# Patient Record
Sex: FEMALE | Race: WHITE | NOT HISPANIC OR LATINO | Employment: OTHER | URBAN - METROPOLITAN AREA
[De-identification: names, ages, dates, MRNs, and addresses within clinical notes are randomized per-mention and may not be internally consistent; named-entity substitution may affect disease eponyms.]

---

## 2017-05-12 ENCOUNTER — APPOINTMENT (OUTPATIENT)
Dept: LAB | Facility: MEDICAL CENTER | Age: 72
End: 2017-05-12
Payer: COMMERCIAL

## 2017-05-12 ENCOUNTER — TRANSCRIBE ORDERS (OUTPATIENT)
Dept: ADMINISTRATIVE | Facility: HOSPITAL | Age: 72
End: 2017-05-12

## 2017-05-12 DIAGNOSIS — E78.00 PURE HYPERCHOLESTEROLEMIA: ICD-10-CM

## 2017-05-12 DIAGNOSIS — I10 ESSENTIAL HYPERTENSION, MALIGNANT: ICD-10-CM

## 2017-05-12 DIAGNOSIS — E11.9 DIABETES MELLITUS WITHOUT COMPLICATION (HCC): ICD-10-CM

## 2017-05-12 DIAGNOSIS — E78.00 PURE HYPERCHOLESTEROLEMIA: Primary | ICD-10-CM

## 2017-05-12 LAB
CHOLEST SERPL-MCNC: 174 MG/DL (ref 50–200)
CREAT UR-MCNC: 179 MG/DL
EST. AVERAGE GLUCOSE BLD GHB EST-MCNC: 148 MG/DL
GLUCOSE P FAST SERPL-MCNC: 143 MG/DL (ref 65–99)
HBA1C MFR BLD: 6.8 % (ref 4.2–6.3)
HDLC SERPL-MCNC: 35 MG/DL (ref 40–60)
LDLC SERPL CALC-MCNC: 83 MG/DL (ref 0–100)
MICROALBUMIN UR-MCNC: 42.8 MG/L (ref 0–20)
MICROALBUMIN/CREAT 24H UR: 24 MG/G CREATININE (ref 0–30)
TRIGL SERPL-MCNC: 279 MG/DL

## 2017-05-12 PROCEDURE — 82570 ASSAY OF URINE CREATININE: CPT | Performed by: FAMILY MEDICINE

## 2017-05-12 PROCEDURE — 36415 COLL VENOUS BLD VENIPUNCTURE: CPT

## 2017-05-12 PROCEDURE — 82043 UR ALBUMIN QUANTITATIVE: CPT | Performed by: FAMILY MEDICINE

## 2017-05-12 PROCEDURE — 83036 HEMOGLOBIN GLYCOSYLATED A1C: CPT

## 2017-05-12 PROCEDURE — 82947 ASSAY GLUCOSE BLOOD QUANT: CPT

## 2017-05-12 PROCEDURE — 80061 LIPID PANEL: CPT

## 2017-12-29 ENCOUNTER — TRANSCRIBE ORDERS (OUTPATIENT)
Dept: ADMINISTRATIVE | Facility: HOSPITAL | Age: 72
End: 2017-12-29

## 2017-12-29 ENCOUNTER — APPOINTMENT (OUTPATIENT)
Dept: LAB | Facility: MEDICAL CENTER | Age: 72
End: 2017-12-29
Payer: COMMERCIAL

## 2017-12-29 DIAGNOSIS — E78.00 PURE HYPERCHOLESTEROLEMIA: Primary | ICD-10-CM

## 2017-12-29 LAB
ALBUMIN SERPL BCP-MCNC: 3.6 G/DL (ref 3.5–5)
ALP SERPL-CCNC: 80 U/L (ref 46–116)
ALT SERPL W P-5'-P-CCNC: 32 U/L (ref 12–78)
ANION GAP SERPL CALCULATED.3IONS-SCNC: 7 MMOL/L (ref 4–13)
AST SERPL W P-5'-P-CCNC: 19 U/L (ref 5–45)
BASOPHILS # BLD AUTO: 0.03 THOUSANDS/ΜL (ref 0–0.1)
BASOPHILS NFR BLD AUTO: 0 % (ref 0–1)
BILIRUB SERPL-MCNC: 0.59 MG/DL (ref 0.2–1)
BUN SERPL-MCNC: 16 MG/DL (ref 5–25)
CALCIUM SERPL-MCNC: 9.2 MG/DL (ref 8.3–10.1)
CHLORIDE SERPL-SCNC: 104 MMOL/L (ref 100–108)
CHOLEST SERPL-MCNC: 200 MG/DL (ref 50–200)
CO2 SERPL-SCNC: 29 MMOL/L (ref 21–32)
CREAT SERPL-MCNC: 1.02 MG/DL (ref 0.6–1.3)
EOSINOPHIL # BLD AUTO: 0.21 THOUSAND/ΜL (ref 0–0.61)
EOSINOPHIL NFR BLD AUTO: 3 % (ref 0–6)
ERYTHROCYTE [DISTWIDTH] IN BLOOD BY AUTOMATED COUNT: 13 % (ref 11.6–15.1)
GFR SERPL CREATININE-BSD FRML MDRD: 55 ML/MIN/1.73SQ M
GLUCOSE P FAST SERPL-MCNC: 147 MG/DL (ref 65–99)
HCT VFR BLD AUTO: 46.2 % (ref 34.8–46.1)
HDLC SERPL-MCNC: 39 MG/DL (ref 40–60)
HGB BLD-MCNC: 15.3 G/DL (ref 11.5–15.4)
LDLC SERPL CALC-MCNC: 82 MG/DL (ref 0–100)
LYMPHOCYTES # BLD AUTO: 2.34 THOUSANDS/ΜL (ref 0.6–4.47)
LYMPHOCYTES NFR BLD AUTO: 29 % (ref 14–44)
MCH RBC QN AUTO: 31 PG (ref 26.8–34.3)
MCHC RBC AUTO-ENTMCNC: 33.1 G/DL (ref 31.4–37.4)
MCV RBC AUTO: 94 FL (ref 82–98)
MONOCYTES # BLD AUTO: 0.8 THOUSAND/ΜL (ref 0.17–1.22)
MONOCYTES NFR BLD AUTO: 10 % (ref 4–12)
NEUTROPHILS # BLD AUTO: 4.59 THOUSANDS/ΜL (ref 1.85–7.62)
NEUTS SEG NFR BLD AUTO: 58 % (ref 43–75)
NRBC BLD AUTO-RTO: 0 /100 WBCS
PLATELET # BLD AUTO: 255 THOUSANDS/UL (ref 149–390)
PMV BLD AUTO: 11.7 FL (ref 8.9–12.7)
POTASSIUM SERPL-SCNC: 4.1 MMOL/L (ref 3.5–5.3)
PROT SERPL-MCNC: 7.2 G/DL (ref 6.4–8.2)
RBC # BLD AUTO: 4.93 MILLION/UL (ref 3.81–5.12)
SODIUM SERPL-SCNC: 140 MMOL/L (ref 136–145)
TRIGL SERPL-MCNC: 395 MG/DL
TSH SERPL DL<=0.05 MIU/L-ACNC: 2.67 UIU/ML (ref 0.36–3.74)
WBC # BLD AUTO: 7.99 THOUSAND/UL (ref 4.31–10.16)

## 2017-12-29 PROCEDURE — 85025 COMPLETE CBC W/AUTO DIFF WBC: CPT | Performed by: FAMILY MEDICINE

## 2017-12-29 PROCEDURE — 80061 LIPID PANEL: CPT | Performed by: FAMILY MEDICINE

## 2017-12-29 PROCEDURE — 80053 COMPREHEN METABOLIC PANEL: CPT | Performed by: FAMILY MEDICINE

## 2017-12-29 PROCEDURE — 84443 ASSAY THYROID STIM HORMONE: CPT | Performed by: FAMILY MEDICINE

## 2017-12-29 PROCEDURE — 36415 COLL VENOUS BLD VENIPUNCTURE: CPT | Performed by: FAMILY MEDICINE

## 2018-01-29 ENCOUNTER — APPOINTMENT (OUTPATIENT)
Dept: LAB | Facility: MEDICAL CENTER | Age: 73
End: 2018-01-29
Payer: COMMERCIAL

## 2018-01-29 ENCOUNTER — TRANSCRIBE ORDERS (OUTPATIENT)
Dept: ADMINISTRATIVE | Facility: HOSPITAL | Age: 73
End: 2018-01-29

## 2018-01-29 DIAGNOSIS — R21 RASH: Primary | ICD-10-CM

## 2018-01-29 DIAGNOSIS — R21 RASH AND OTHER NONSPECIFIC SKIN ERUPTION: Primary | ICD-10-CM

## 2018-01-29 LAB
BASOPHILS # BLD AUTO: 0.03 THOUSANDS/ΜL (ref 0–0.1)
BASOPHILS NFR BLD AUTO: 0 % (ref 0–1)
EOSINOPHIL # BLD AUTO: 0.18 THOUSAND/ΜL (ref 0–0.61)
EOSINOPHIL NFR BLD AUTO: 2 % (ref 0–6)
ERYTHROCYTE [DISTWIDTH] IN BLOOD BY AUTOMATED COUNT: 13.1 % (ref 11.6–15.1)
HCT VFR BLD AUTO: 42.7 % (ref 34.8–46.1)
HGB BLD-MCNC: 14.1 G/DL (ref 11.5–15.4)
LYMPHOCYTES # BLD AUTO: 2.61 THOUSANDS/ΜL (ref 0.6–4.47)
LYMPHOCYTES NFR BLD AUTO: 30 % (ref 14–44)
MCH RBC QN AUTO: 30.8 PG (ref 26.8–34.3)
MCHC RBC AUTO-ENTMCNC: 33 G/DL (ref 31.4–37.4)
MCV RBC AUTO: 93 FL (ref 82–98)
MONOCYTES # BLD AUTO: 0.85 THOUSAND/ΜL (ref 0.17–1.22)
MONOCYTES NFR BLD AUTO: 10 % (ref 4–12)
NEUTROPHILS # BLD AUTO: 5.02 THOUSANDS/ΜL (ref 1.85–7.62)
NEUTS SEG NFR BLD AUTO: 58 % (ref 43–75)
NRBC BLD AUTO-RTO: 0 /100 WBCS
PLATELET # BLD AUTO: 249 THOUSANDS/UL (ref 149–390)
PMV BLD AUTO: 11.5 FL (ref 8.9–12.7)
RBC # BLD AUTO: 4.58 MILLION/UL (ref 3.81–5.12)
WBC # BLD AUTO: 8.71 THOUSAND/UL (ref 4.31–10.16)

## 2018-01-29 PROCEDURE — 85025 COMPLETE CBC W/AUTO DIFF WBC: CPT | Performed by: PHYSICIAN ASSISTANT

## 2018-01-29 PROCEDURE — 36415 COLL VENOUS BLD VENIPUNCTURE: CPT | Performed by: PHYSICIAN ASSISTANT

## 2018-06-22 ENCOUNTER — APPOINTMENT (EMERGENCY)
Dept: ULTRASOUND IMAGING | Facility: HOSPITAL | Age: 73
End: 2018-06-22
Payer: COMMERCIAL

## 2018-06-22 ENCOUNTER — OFFICE VISIT (OUTPATIENT)
Dept: URGENT CARE | Facility: CLINIC | Age: 73
End: 2018-06-22
Payer: COMMERCIAL

## 2018-06-22 ENCOUNTER — HOSPITAL ENCOUNTER (EMERGENCY)
Facility: HOSPITAL | Age: 73
Discharge: HOME | End: 2018-06-22
Attending: EMERGENCY MEDICINE | Admitting: EMERGENCY MEDICINE
Payer: COMMERCIAL

## 2018-06-22 VITALS
SYSTOLIC BLOOD PRESSURE: 208 MMHG | DIASTOLIC BLOOD PRESSURE: 84 MMHG | HEART RATE: 78 BPM | RESPIRATION RATE: 16 BRPM | OXYGEN SATURATION: 95 % | HEIGHT: 70 IN | TEMPERATURE: 97.4 F | WEIGHT: 195 LBS | BODY MASS INDEX: 27.92 KG/M2

## 2018-06-22 DIAGNOSIS — L81.9 DISCOLORATION OF SKIN OF FOOT: Primary | ICD-10-CM

## 2018-06-22 DIAGNOSIS — R23.0 TOE CYANOSIS: Primary | ICD-10-CM

## 2018-06-22 LAB
ANION GAP SERPL CALCULATED.3IONS-SCNC: 8 MMOL/L (ref 4–13)
APTT PPP: 29 SECONDS (ref 24–36)
BASOPHILS # BLD AUTO: 0.04 THOUSANDS/ΜL (ref 0–0.1)
BASOPHILS NFR BLD AUTO: 0 % (ref 0–1)
BUN SERPL-MCNC: 17 MG/DL (ref 5–25)
CALCIUM SERPL-MCNC: 9.4 MG/DL (ref 8.3–10.1)
CHLORIDE SERPL-SCNC: 106 MMOL/L (ref 100–108)
CK SERPL-CCNC: 70 U/L (ref 26–192)
CO2 SERPL-SCNC: 29 MMOL/L (ref 21–32)
CREAT SERPL-MCNC: 1.06 MG/DL (ref 0.6–1.3)
EOSINOPHIL # BLD AUTO: 0.11 THOUSAND/ΜL (ref 0–0.61)
EOSINOPHIL NFR BLD AUTO: 1 % (ref 0–6)
ERYTHROCYTE [DISTWIDTH] IN BLOOD BY AUTOMATED COUNT: 12.6 % (ref 11.6–15.1)
GFR SERPL CREATININE-BSD FRML MDRD: 52 ML/MIN/1.73SQ M
GLUCOSE SERPL-MCNC: 184 MG/DL (ref 65–140)
HCT VFR BLD AUTO: 45.6 % (ref 34.8–46.1)
HGB BLD-MCNC: 15.3 G/DL (ref 11.5–15.4)
IMM GRANULOCYTES # BLD AUTO: 0.05 THOUSAND/UL (ref 0–0.2)
IMM GRANULOCYTES NFR BLD AUTO: 1 % (ref 0–2)
INR PPP: 0.97 (ref 0.86–1.17)
LYMPHOCYTES # BLD AUTO: 2.19 THOUSANDS/ΜL (ref 0.6–4.47)
LYMPHOCYTES NFR BLD AUTO: 21 % (ref 14–44)
MCH RBC QN AUTO: 31.1 PG (ref 26.8–34.3)
MCHC RBC AUTO-ENTMCNC: 33.6 G/DL (ref 31.4–37.4)
MCV RBC AUTO: 93 FL (ref 82–98)
MONOCYTES # BLD AUTO: 0.98 THOUSAND/ΜL (ref 0.17–1.22)
MONOCYTES NFR BLD AUTO: 9 % (ref 4–12)
NEUTROPHILS # BLD AUTO: 7.14 THOUSANDS/ΜL (ref 1.85–7.62)
NEUTS SEG NFR BLD AUTO: 68 % (ref 43–75)
NRBC BLD AUTO-RTO: 0 /100 WBCS
PLATELET # BLD AUTO: 235 THOUSANDS/UL (ref 149–390)
PMV BLD AUTO: 11.1 FL (ref 8.9–12.7)
POTASSIUM SERPL-SCNC: 3.9 MMOL/L (ref 3.5–5.3)
PROTHROMBIN TIME: 12.8 SECONDS (ref 11.8–14.2)
RBC # BLD AUTO: 4.92 MILLION/UL (ref 3.81–5.12)
SODIUM SERPL-SCNC: 143 MMOL/L (ref 136–145)
WBC # BLD AUTO: 10.51 THOUSAND/UL (ref 4.31–10.16)

## 2018-06-22 PROCEDURE — 80048 BASIC METABOLIC PNL TOTAL CA: CPT | Performed by: PHYSICIAN ASSISTANT

## 2018-06-22 PROCEDURE — 85610 PROTHROMBIN TIME: CPT | Performed by: PHYSICIAN ASSISTANT

## 2018-06-22 PROCEDURE — 99213 OFFICE O/P EST LOW 20 MIN: CPT | Performed by: PHYSICIAN ASSISTANT

## 2018-06-22 PROCEDURE — 93923 UPR/LXTR ART STDY 3+ LVLS: CPT

## 2018-06-22 PROCEDURE — 82550 ASSAY OF CK (CPK): CPT | Performed by: PHYSICIAN ASSISTANT

## 2018-06-22 PROCEDURE — 85025 COMPLETE CBC W/AUTO DIFF WBC: CPT | Performed by: PHYSICIAN ASSISTANT

## 2018-06-22 PROCEDURE — 36415 COLL VENOUS BLD VENIPUNCTURE: CPT | Performed by: PHYSICIAN ASSISTANT

## 2018-06-22 PROCEDURE — 99284 EMERGENCY DEPT VISIT MOD MDM: CPT

## 2018-06-22 PROCEDURE — 85730 THROMBOPLASTIN TIME PARTIAL: CPT | Performed by: PHYSICIAN ASSISTANT

## 2018-06-22 RX ORDER — IBUPROFEN 200 MG
TABLET ORAL EVERY 6 HOURS PRN
COMMUNITY

## 2018-06-22 RX ORDER — LORAZEPAM 2 MG/ML
0.5 INJECTION INTRAMUSCULAR ONCE
Status: DISCONTINUED | OUTPATIENT
Start: 2018-06-22 | End: 2018-06-22

## 2018-06-22 RX ORDER — QUINAPRIL 40 MG/1
40 TABLET ORAL 2 TIMES DAILY
COMMUNITY

## 2018-06-22 RX ORDER — QUINAPRIL 20 MG/1
40 TABLET ORAL DAILY
Status: DISCONTINUED | OUTPATIENT
Start: 2018-06-22 | End: 2018-06-22

## 2018-06-22 RX ORDER — ASPIRIN 81 MG/1
81 TABLET, CHEWABLE ORAL ONCE
Status: COMPLETED | OUTPATIENT
Start: 2018-06-22 | End: 2018-06-22

## 2018-06-22 RX ORDER — ASPIRIN 81 MG/1
81 TABLET ORAL DAILY
Qty: 30 TABLET | Refills: 0 | Status: SHIPPED | OUTPATIENT
Start: 2018-06-22 | End: 2018-06-25 | Stop reason: ALTCHOICE

## 2018-06-22 RX ADMIN — ASPIRIN 81 MG 81 MG: 81 TABLET ORAL at 22:10

## 2018-06-22 NOTE — PATIENT INSTRUCTIONS
Bottom of left foot is mottled and discolored  Great toe is purple and is cold upon palpation  I am concerned about decreased blood flow to this area therefore I recommend that you go to the ER for further work-up and management

## 2018-06-22 NOTE — PROGRESS NOTES
[facility]        NAME: Arjun Riddle is a 74 y o  female  : 1945    MRN: [id number]  DATE: 2018  TIME: 4:49 PM    Assessment and Plan   Discoloration of skin of foot [L81 9]  1  Discoloration of skin of foot           Patient Instructions     Bottom of left foot is mottled and discolored  Great toe is purple and is cold upon palpation  I am concerned about a vascular issue therefore I recommend that you go to the ER for further work-up and management  Chief Complaint     Chief Complaint   Patient presents with    Foot Pain     L foot  Pt arrives from PT at [facility]  Was directed to come here by staff because of "discoloration" L foot  History of Present Illness       The patient is a 75-year-old female with a medical history of hypertension and hyperlipidemia who presents today for evaluation of left foot pain  Patient states that she saw a podiatrist on Monday and was diagnosed with plantar fasciitis and was given a cortisone injection  She states that the podiatrist ordered her physical therapy and she was just there prior to arrival and they took a look at her left foot in they did not like the color of it therefore they recommended she come here for evaluation  Patient states that she previously had x-rays of her foot which were normal   She denies any recent injury or trauma  Review of Systems   Review of Systems   Constitutional: Negative for chills and fever  Musculoskeletal: Positive for arthralgias  Skin: Positive for color change  Neurological: Negative for numbness           Current Medications       Current Outpatient Prescriptions:     ibuprofen (MOTRIN) 200 mg tablet, Take by mouth every 6 (six) hours as needed for mild pain, Disp: , Rfl:     LOVASTATIN PO, Take 40 mg by mouth, Disp: , Rfl:     quinapril (ACCUPRIL) 40 MG tablet, Take 40 mg by mouth daily at bedtime, Disp: , Rfl:     Current Allergies     Allergies as of 2018 - Reviewed 06/22/2018   Allergen Reaction Noted    Meperidine  05/11/2016    Morphine and related  05/11/2016    Sudafed [pseudoephedrine]  06/22/2018            The following portions of the patient's history were reviewed and updated as appropriate: allergies, current medications, past family history, past medical history, past social history, past surgical history and problem list      No past medical history on file  No past surgical history on file  No family history on file  Medications have been verified  Objective   There were no vitals taken for this visit  Physical Exam     Physical Exam   Constitutional: She is oriented to person, place, and time  She appears well-developed and well-nourished  No distress  Cardiovascular: Normal rate, regular rhythm and normal heart sounds  Pulmonary/Chest: Effort normal and breath sounds normal  She has no wheezes  She has no rales  Musculoskeletal:        Feet:    Neurological: She is alert and oriented to person, place, and time  She has normal strength  No sensory deficit  Skin: Skin is warm and dry  Psychiatric: She has a normal mood and affect  Nursing note and vitals reviewed

## 2018-06-23 ENCOUNTER — TELEPHONE (OUTPATIENT)
Dept: EMERGENCY DEPT | Facility: HOSPITAL | Age: 73
End: 2018-06-23

## 2018-06-23 PROCEDURE — 93923 UPR/LXTR ART STDY 3+ LVLS: CPT | Performed by: SURGERY

## 2018-06-23 NOTE — ED NOTES
Pt ambulated to bathroom, gait unsteady secondary to pain with ambulating that shoots up her legstates she is using cane at home     [address], RN  06/22/18 2019

## 2018-06-23 NOTE — DISCHARGE INSTRUCTIONS
- IF YOU NOTICE SUDDEN WORSENING OF THE COLOR OF HER TOE OR FOOT, WHITE FOOT, COLD FOOT, UNABLE TO MOVE HER FOOT, WORSENING PAIN OR FOOT PLEASE RETURN IMMEDIATELY TO THE EMERGENCY DEPARTMENT FOLLOW UP WITH VASCULAR SURGEON ON MONDAY PLEASE  THE TAKE BABY ASPIRIN DAILY PER    Hypertension   WHAT YOU NEED TO KNOW:   Hypertension is high blood pressure (BP)  Your BP is the force of your blood moving against the walls of your arteries  Normal BP is less than 120/80  Prehypertension is between 120/80 and 139/89  Hypertension is 140/90 or higher  Hypertension causes your BP to get so high that your heart has to work much harder than normal  This can damage your heart  You can control hypertension with a healthy lifestyle or medicines  A controlled blood pressure helps protect your organs, such as your heart, lungs, brain, and kidneys  DISCHARGE INSTRUCTIONS:   Call 911 for any of the following:   · You have discomfort in your chest that feels like squeezing, pressure, fullness, or pain  · You become confused or have difficulty speaking  · You suddenly feel lightheaded or have trouble breathing  · You have pain or discomfort in your back, neck, jaw, stomach, or arm  Return to the emergency department if:   · You have a severe headache or vision loss  · You have weakness in an arm or leg  Contact your healthcare provider if:   · You feel faint, dizzy, confused, or drowsy  · You have been taking your BP medicine and your BP is still higher than your healthcare provider says it should be  · You have questions or concerns about your condition or care  Medicines: You may  need any of the following:  · Medicine  may be used to help lower your BP  You may need more than one type of medicine  Take the medicine exactly as directed  · Diuretics  help decrease extra fluid that collects in your body  This will help lower your BP  You may urinate more often while you take this medicine      · Cholesterol medicine  helps lower your cholesterol level  A low cholesterol level helps prevent heart disease and makes it easier to control your blood pressure  · Take your medicine as directed  Contact your healthcare provider if you think your medicine is not helping or if you have side effects  Tell him or her if you are allergic to any medicine  Keep a list of the medicines, vitamins, and herbs you take  Include the amounts, and when and why you take them  Bring the list or the pill bottles to follow-up visits  Carry your medicine list with you in case of an emergency  Follow up with your healthcare provider as directed: You will need to return to have your BP checked and to have other lab tests done  Write down your questions so you remember to ask them during your visits  Manage hypertension:  Talk with your healthcare provider about these and other ways to manage hypertension:  · Check your BP at home  Sit and rest for 5 minutes before you take your BP  Extend your arm and support it on a flat surface  Your arm should be at the same level as your heart  Follow the directions that came with your BP monitor  If possible, take at least 2 BP readings each time  Take your BP at least twice a day at the same times each day, such as morning and evening  Keep a record of your BP readings and bring it to your follow-up visits  Ask your healthcare provider what your BP should be  · Limit sodium (salt) as directed  Too much sodium can affect your fluid balance  Check labels to find low-sodium or no-salt-added foods  Some low-sodium foods use potassium salts for flavor  Too much potassium can also cause health problems  Your healthcare provider will tell you how much sodium and potassium are safe for you to have in a day  He or she may recommend that you limit sodium to 2,300 mg a day  · Follow the meal plan recommended by your healthcare provider    A dietitian or your provider can give you more information on low-sodium plans or the DASH (Dietary Approaches to Stop Hypertension) eating plan  The DASH plan is low in sodium, unhealthy fats, and total fat  It is high in potassium, calcium, and fiber  · Exercise to maintain a healthy weight  Exercise at least 30 minutes per day, on most days of the week  This will help decrease your blood pressure  Ask your healthcare provider about the best exercise plan for you  · Decrease stress  This may help lower your BP  Learn ways to relax, such as deep breathing or listening to music  · Limit alcohol  Women should limit alcohol to 1 drink a day  Men should limit alcohol to 2 drinks a day  A drink of alcohol is 12 ounces of beer, 5 ounces of wine, or 1½ ounces of liquor  · Do not smoke  Nicotine and other chemicals in cigarettes and cigars can increase your BP and also cause lung damage  Ask your healthcare provider for information if you currently smoke and need help to quit  E-cigarettes or smokeless tobacco still contain nicotine  Talk to your healthcare provider before you use these products  · Manage any other health conditions you have  Health conditions such as diabetes can increase your risk for hypertension  Follow your healthcare provider's instructions and take all your medicines as directed  © 2017 [address]  Information is for End User's use only and may not be sold, redistributed or otherwise used for commercial purposes  All illustrations and images included in CareNotes® are the copyrighted property of [employer]  or Jamin Anderson  The above information is an  only  It is not intended as medical advice for individual conditions or treatments  Talk to your doctor, nurse or pharmacist before following any medical regimen to see if it is safe and effective for you

## 2018-06-23 NOTE — ED PROVIDER NOTES
History  Chief Complaint   Patient presents with    Foot Pain     Pt was seen by foot specialist on Monday and gave shot of cortizone for left foot, went to PT, PT sent to [facility] because of concern of discoloration of foot, [facility] sent to ER for evaluation  Denies numbness/tingling  States slight swelling  Toe Pain   Location:  Left great toe  Quality:  Discoloration  Severity:  Mild  Onset quality:  Gradual  Duration:  3 weeks  Timing:  Constant  Progression:  Worsening (has not worsened over the past week)  Chronicity:  New  Context:  No history of a-fib  Relieved by:  Nothing  Worsened by:  Nothing  Associated symptoms: no abdominal pain, no chest pain, no congestion, no diarrhea, no ear pain, no fatigue, no fever, no headaches, no myalgias, no nausea, no rash, no shortness of breath, no sore throat, no vomiting and no wheezing        Prior to Admission Medications   Prescriptions Last Dose Informant Patient Reported? Taking? LOVASTATIN PO   Yes No   Sig: Take 40 mg by mouth   ibuprofen (MOTRIN) 200 mg tablet   Yes No   Sig: Take by mouth every 6 (six) hours as needed for mild pain   quinapril (ACCUPRIL) 40 MG tablet   Yes No   Sig: Take 40 mg by mouth daily at bedtime      Facility-Administered Medications: None       No past medical history on file  No past surgical history on file  No family history on file  I have reviewed and agree with the history as documented  Social History   Substance Use Topics    Smoking status: Never Smoker    Smokeless tobacco: Not on file    Alcohol use No        Review of Systems   Constitutional: Negative for activity change, chills, fatigue and fever  HENT: Negative for congestion, ear pain, mouth sores, sore throat and trouble swallowing  Eyes: Negative for photophobia and visual disturbance  Respiratory: Negative for chest tightness, shortness of breath and wheezing  Cardiovascular: Negative for chest pain and palpitations  Gastrointestinal: Negative for abdominal pain, constipation, diarrhea, nausea and vomiting  Genitourinary: Negative for decreased urine volume, difficulty urinating, dysuria, genital sores and hematuria  Musculoskeletal: Negative for arthralgias, myalgias, neck pain and neck stiffness  Skin: Negative for rash and wound  Neurological: Negative for dizziness, syncope, weakness, light-headedness, numbness and headaches  Hematological: Negative for adenopathy  All other systems reviewed and are negative  Physical Exam  Physical Exam   Constitutional: She is oriented to person, place, and time  She appears well-developed and well-nourished  No distress  HENT:   Head: Normocephalic and atraumatic  Right Ear: External ear normal    Left Ear: External ear normal    Nose: Nose normal    Mouth/Throat: Oropharynx is clear and moist    Eyes: Conjunctivae and EOM are normal  Pupils are equal, round, and reactive to light  No scleral icterus  Neck: Normal range of motion  Neck supple  Cardiovascular: Normal rate, regular rhythm, normal heart sounds and intact distal pulses  Exam reveals no gallop and no friction rub  No murmur heard  Pulmonary/Chest: Effort normal and breath sounds normal  No respiratory distress  She has no wheezes  Abdominal: Soft  She exhibits no distension  There is no tenderness  There is no guarding  Musculoskeletal: Normal range of motion  She exhibits no edema, tenderness or deformity  Lymphadenopathy:     She has no cervical adenopathy  Neurological: She is alert and oriented to person, place, and time  Skin: Skin is warm and dry  Capillary refill takes less than 2 seconds  She is not diaphoretic  Nursing note and vitals reviewed        Vital Signs  ED Triage Vitals [06/22/18 1746]   Temperature Pulse Respirations Blood Pressure SpO2   97 6 °F (36 4 °C) 92 18 (!) 222/100 95 %      Temp Source Heart Rate Source Patient Position - Orthostatic VS BP Location FiO2 (%)   Oral Monitor Sitting Left arm --      Pain Score       7           Vitals:    06/22/18 1746 06/22/18 1845 06/22/18 1938 06/22/18 2208   BP: (!) 222/100 (!) 240/102 (!) 230/98 (!) 208/84   Pulse: 92 82 88 78   Patient Position - Orthostatic VS: Sitting Sitting Lying Lying       Visual Acuity      ED Medications  Medications   aspirin chewable tablet 81 mg (81 mg Oral Given 6/22/18 2210)       Diagnostic Studies  Results Reviewed     Procedure Component Value Units Date/Time    Basic metabolic panel [[id number]]  (Abnormal) Collected:  06/22/18 1932    Lab Status:  Final result Specimen:  Blood from Arm, Right Updated:  06/22/18 2003     Sodium 143 mmol/L      Potassium 3 9 mmol/L      Chloride 106 mmol/L      CO2 29 mmol/L      Anion Gap 8 mmol/L      BUN 17 mg/dL      Creatinine 1 06 mg/dL      Glucose 184 (H) mg/dL      Calcium 9 4 mg/dL      eGFR 52 ml/min/1 73sq m     Narrative:         National Kidney Disease Education Program recommendations are as follows:  GFR calculation is accurate only with a steady state creatinine  Chronic Kidney disease less than 60 ml/min/1 73 sq  meters  Kidney failure less than 15 ml/min/1 73 sq  meters      CK (with reflex to MB) [[id number]]  (Normal) Collected:  06/22/18 1932    Lab Status:  Final result Specimen:  Blood from Arm, Right Updated:  06/22/18 2003     Total CK 70 U/L     Protime-INR [[id number]]  (Normal) Collected:  06/22/18 1932    Lab Status:  Final result Specimen:  Blood from Arm, Right Updated:  06/22/18 1959     Protime 12 8 seconds      INR 0 97    APTT [[id number]]  (Normal) Collected:  06/22/18 1932    Lab Status:  Final result Specimen:  Blood from Arm, Right Updated:  06/22/18 1959     PTT 29 seconds     CBC and differential [[id number]]  (Abnormal) Collected:  06/22/18 1932    Lab Status:  Final result Specimen:  Blood from Arm, Right Updated:  06/22/18 1948     WBC 10 51 (H) Thousand/uL      RBC 4 92 Million/uL      Hemoglobin 15 3 g/dL      Hematocrit 45 6 % MCV 93 fL      MCH 31 1 pg      MCHC 33 6 g/dL      RDW 12 6 %      MPV 11 1 fL      Platelets 683 Thousands/uL      nRBC 0 /100 WBCs      Neutrophils Relative 68 %      Immat GRANS % 1 %      Lymphocytes Relative 21 %      Monocytes Relative 9 %      Eosinophils Relative 1 %      Basophils Relative 0 %      Neutrophils Absolute 7 14 Thousands/µL      Immature Grans Absolute 0 05 Thousand/uL      Lymphocytes Absolute 2 19 Thousands/µL      Monocytes Absolute 0 98 Thousand/µL      Eosinophils Absolute 0 11 Thousand/µL      Basophils Absolute 0 04 Thousands/µL                  VAS JOANA & waveform analysis, multiple levels    (Results Pending)              Procedures  Procedures       Phone Contacts  ED Phone Contact    ED Course  ED Course as of Jun 23 0032 Fri Jun 22, 2018 2043 Vascular tech aware of patient  He states that he has two other STAT arterial US to perform on other patients in the ICU at this time  He states that has patient ankle is warm and has pulses he will perform the other STAT tests prior to this patient she seems lower risk  I reassured tech I spoke with vascular surgeon and that this test is desired  He acknowledges and will return  2204 Spoke with  who recommended US after patient was intiial evaluated  2205 BP improved to 203 SBP  Patient refusing ativan and would jsut like to go home as she knows that her elevated BP is from anxiety  Will discharge on ASA daily with vascular follow up Monday and strict return precautions for worsening BP symptoms and foot ischemia                                MDM  Number of Diagnoses or Management Options  Toe cyanosis: new and requires workup  Diagnosis management comments: Differential diagnosis to include not limited to: Foot ischemia, Raynaud's syndrome, PAD,    Patient is a 25-year-old female past medical history of hypertension presents to the emergency department for evaluation of discolored toe    Patient states she has been having discussed the for last 3 weeks  She is all Podiatry 4 days ago who did not mention the discolored toe  She states she went to physical therapy today and they were concerned about the coloration of her toe and the lateral foot so sent her to the emergency department  Patient states that the discoloration has progressively gotten worse over the last 3 weeks however did not over the past week  No pain at the toe  No pain in the foot  She had previous pain on the plantar aspect of her foot which was previously diagnosed as plantar fasciitis  On exam patient does have 1+ DP pulses bilaterally  Cap refill 3-4 seconds of affected toe and lateral foot  Plan will be to talk to vascular surgery to be guided for appropriate workup  Update:  I spoke with Dr Harsha Mcintosh who recommends that we get to arterial ultrasound  If this looks normal patient can start baby aspirin daily and follow up with vascular Center on Monday  Patient made aware of this plan  Amount and/or Complexity of Data Reviewed  Clinical lab tests: ordered and reviewed  Tests in the radiology section of CPT®: ordered and reviewed    Risk of Complications, Morbidity, and/or Mortality  Presenting problems: moderate  Diagnostic procedures: moderate  Management options: low    Patient Progress  Patient progress: stable    CritCare Time    Disposition  Final diagnoses: Toe cyanosis     Time reflects when diagnosis was documented in both MDM as applicable and the Disposition within this note     Time User Action Codes Description Comment    6/22/2018 10:08 PM John Gonzalez Miss Add [R23 0] Toe cyanosis       ED Disposition     ED Disposition Condition Comment    Discharge  Kiara Molina discharge to home/self care      Condition at discharge: Stable        Follow-up Information     Follow up With Specialties Details Why Juanyre Drake [address] Emergency Department Emergency Medicine  If symptoms worsen [address] [facility] [address]  [phone] [place] ED, [address] [facility], [place], [place], [address] [facility], [place] Vascular Surgery, Radiology Call in 2 days  [address]  2[address] [facility] Chey Hernandez [address] [facility]  [phone]             Discharge Medication List as of 6/22/2018 10:11 PM      START taking these medications    Details   aspirin (ECOTRIN LOW STRENGTH) 81 mg EC tablet Take 1 tablet (81 mg total) by mouth daily, Starting Fri 6/22/2018, Print         CONTINUE these medications which have NOT CHANGED    Details   ibuprofen (MOTRIN) 200 mg tablet Take by mouth every 6 (six) hours as needed for mild pain, Historical Med      LOVASTATIN PO Take 40 mg by mouth, Historical Med      quinapril (ACCUPRIL) 40 MG tablet Take 40 mg by mouth daily at bedtime, Historical Med           No discharge procedures on file      ED Provider  Electronically Signed by           Andrew Beltre PA-C  06/23/18 9303

## 2018-06-25 ENCOUNTER — DOCUMENTATION (OUTPATIENT)
Dept: VASCULAR SURGERY | Facility: CLINIC | Age: 73
End: 2018-06-25

## 2018-06-25 ENCOUNTER — OFFICE VISIT (OUTPATIENT)
Dept: VASCULAR SURGERY | Facility: CLINIC | Age: 73
End: 2018-06-25
Payer: COMMERCIAL

## 2018-06-25 VITALS
SYSTOLIC BLOOD PRESSURE: 210 MMHG | HEIGHT: 70 IN | RESPIRATION RATE: 16 BRPM | TEMPERATURE: 98.8 F | BODY MASS INDEX: 28.49 KG/M2 | HEART RATE: 100 BPM | WEIGHT: 199 LBS | DIASTOLIC BLOOD PRESSURE: 84 MMHG

## 2018-06-25 DIAGNOSIS — R23.0 TOE CYANOSIS: Primary | ICD-10-CM

## 2018-06-25 PROCEDURE — 99203 OFFICE O/P NEW LOW 30 MIN: CPT | Performed by: SURGERY

## 2018-06-25 RX ORDER — LOVASTATIN 40 MG/1
TABLET ORAL
COMMUNITY
Start: 2018-03-20

## 2018-06-25 RX ORDER — HYDROCHLOROTHIAZIDE 25 MG/1
TABLET ORAL
COMMUNITY
Start: 2018-03-20 | End: 2018-11-12 | Stop reason: CLARIF

## 2018-06-25 NOTE — PROGRESS NOTES
Patient was seen in ER 6/22 for toe discoloration, had ABIs done and d/c on aspirin  Per Dr Arthur Travis, patient is to follow up with us today   Sent message to call center to schedule follow up appointment today if possible

## 2018-06-25 NOTE — LETTER
June 25, 2018     [address]    Patient: Veronica Tapia   YOB: 1945   Date of Visit: 6/25/2018       Dear Dr Indy Spears: Thank you for referring Veronica Tapia to me for evaluation  Below are my notes for this consultation  If you have questions, please do not hesitate to call me  I look forward to following your patient along with you  Sincerely,        Briseyda Browne MD        CC: No Recipients  Briseyda Browne MD  6/25/2018 12:41 PM  Sign at close encounter  Assessment/Plan:    Toe cyanosis  Cyanotic changes of the tip of the left 1st toe  Unclear if this is an embolic event or related to trauma  She has a palpable dorsalis pedis pulse bilaterally  She does have risk factors of hyperlipidemia and hypertension and a family history of atrial fibrillation  EKG in the office which does not show any evidence of atrial fibrillation  I will obtain a lower extremity arterial duplex and an abdominal aortic duplex to rule out any aneurysmal disease that could be a potential source of embolism  In the meantime she should consider 81 milligram aspirin daily  I will see her back after these tests are performed  Diagnoses and all orders for this visit:    Toe cyanosis  -     ECG 12 lead; Future  -     VAS lower limb arterial duplex, limited/unilateral; Future  -     VAS abdominal aorta/iliacs; complete study; Future    Other orders  -     lovastatin (MEVACOR) 40 MG tablet;   -     hydrochlorothiazide (HYDRODIURIL) 25 mg tablet;           Subjective:      Patient ID: Veronica Tapia is a 74 y o  female  Patient is new to the practice  Patient had JOANA and waveforms on 6/22  Patient is c/o of discoloration in the L foot and L great toe that has been present for about 3 weeks  Patient received a cortisone injection in the L heel on Monday and was advised to do PT  Therapist was concerned about toe and she was sent to ER   She has coldness in both feet  She denies calf cramping  She denies tobacco use  The following portions of the patient's history were reviewed and updated as appropriate: allergies, current medications, past family history, past medical history, past social history, past surgical history and problem list     Review of Systems   Constitutional: Negative  HENT: Positive for sinus pressure  Eyes: Negative  Respiratory: Positive for cough  Cardiovascular: Negative  Gastrointestinal: Negative  Endocrine: Negative  Genitourinary: Negative  Musculoskeletal: Positive for arthralgias and gait problem  Leg cramping with walking   Skin: Positive for rash  Allergic/Immunologic: Positive for food allergies  Hematological: Negative  Psychiatric/Behavioral: The patient is nervous/anxious  Objective:      BP (!) 210/84 (BP Location: Left arm, Patient Position: Sitting, Cuff Size: Adult)   Pulse 100   Temp 98 8 °F (37 1 °C) (Tympanic)   Resp 16   Ht 5' 10" (1 778 m)   Wt 90 3 kg (199 lb)   BMI 28 55 kg/m²           Physical Exam   Constitutional: She is oriented to person, place, and time  She appears well-developed and well-nourished  HENT:   Head: Normocephalic and atraumatic  Neck: Normal range of motion  Neck supple  Cardiovascular: Normal rate, regular rhythm and normal heart sounds  Exam reveals no gallop and no friction rub  No murmur heard  Pulses:       Radial pulses are 2+ on the right side, and 2+ on the left side  Dorsalis pedis pulses are 1+ on the right side, and 1+ on the left side  Tachycardic   Musculoskeletal: Normal range of motion  She exhibits no edema  Neurological: She is alert and oriented to person, place, and time  Skin: Skin is warm and dry  No erythema  Cyanotic distal left 1st toe  There is good cap refill  There is no evidence of tissue loss

## 2018-06-25 NOTE — PROGRESS NOTES
Assessment/Plan:    Toe cyanosis  Cyanotic changes of the tip of the left 1st toe  Unclear if this is an embolic event or related to trauma  She has a palpable dorsalis pedis pulse bilaterally  She does have risk factors of hyperlipidemia and hypertension and a family history of atrial fibrillation  EKG in the office which does not show any evidence of atrial fibrillation  I will obtain a lower extremity arterial duplex and an abdominal aortic duplex to rule out any aneurysmal disease that could be a potential source of embolism  In the meantime she should consider 81 milligram aspirin daily  I will see her back after these tests are performed  Diagnoses and all orders for this visit:    Toe cyanosis  -     ECG 12 lead; Future  -     VAS lower limb arterial duplex, limited/unilateral; Future  -     VAS abdominal aorta/iliacs; complete study; Future    Other orders  -     lovastatin (MEVACOR) 40 MG tablet;   -     hydrochlorothiazide (HYDRODIURIL) 25 mg tablet;           Subjective:      Patient ID: Oziel Campbell is a 74 y o  female  Patient is new to the practice  Patient had JOANA and waveforms on 6/22  Patient is c/o of discoloration in the L foot and L great toe that has been present for about 3 weeks  Patient received a cortisone injection in the L heel on Monday and was advised to do PT  Therapist was concerned about toe and she was sent to ER  She has coldness in both feet  She denies calf cramping  She denies tobacco use  The following portions of the patient's history were reviewed and updated as appropriate: allergies, current medications, past family history, past medical history, past social history, past surgical history and problem list     Review of Systems   Constitutional: Negative  HENT: Positive for sinus pressure  Eyes: Negative  Respiratory: Positive for cough  Cardiovascular: Negative  Gastrointestinal: Negative  Endocrine: Negative      Genitourinary: Negative  Musculoskeletal: Positive for arthralgias and gait problem  Leg cramping with walking   Skin: Positive for rash  Allergic/Immunologic: Positive for food allergies  Hematological: Negative  Psychiatric/Behavioral: The patient is nervous/anxious  Objective:      BP (!) 210/84 (BP Location: Left arm, Patient Position: Sitting, Cuff Size: Adult)   Pulse 100   Temp 98 8 °F (37 1 °C) (Tympanic)   Resp 16   Ht 5' 10" (1 778 m)   Wt 90 3 kg (199 lb)   BMI 28 55 kg/m²          Physical Exam   Constitutional: She is oriented to person, place, and time  She appears well-developed and well-nourished  HENT:   Head: Normocephalic and atraumatic  Neck: Normal range of motion  Neck supple  Cardiovascular: Normal rate, regular rhythm and normal heart sounds  Exam reveals no gallop and no friction rub  No murmur heard  Pulses:       Radial pulses are 2+ on the right side, and 2+ on the left side  Dorsalis pedis pulses are 1+ on the right side, and 1+ on the left side  Tachycardic   Musculoskeletal: Normal range of motion  She exhibits no edema  Neurological: She is alert and oriented to person, place, and time  Skin: Skin is warm and dry  No erythema  Cyanotic distal left 1st toe  There is good cap refill  There is no evidence of tissue loss

## 2018-07-19 ENCOUNTER — HOSPITAL ENCOUNTER (OUTPATIENT)
Dept: NON INVASIVE DIAGNOSTICS | Facility: CLINIC | Age: 73
Discharge: HOME | End: 2018-07-19
Payer: COMMERCIAL

## 2018-07-19 DIAGNOSIS — R23.0 TOE CYANOSIS: ICD-10-CM

## 2018-07-19 PROCEDURE — 93923 UPR/LXTR ART STDY 3+ LVLS: CPT

## 2018-07-19 PROCEDURE — 93978 VASCULAR STUDY: CPT

## 2018-07-19 PROCEDURE — 93925 LOWER EXTREMITY STUDY: CPT

## 2018-07-22 PROCEDURE — 93922 UPR/L XTREMITY ART 2 LEVELS: CPT | Performed by: SURGERY

## 2018-07-22 PROCEDURE — 93978 VASCULAR STUDY: CPT | Performed by: SURGERY

## 2018-07-22 PROCEDURE — 93925 LOWER EXTREMITY STUDY: CPT | Performed by: SURGERY

## 2018-09-25 ENCOUNTER — TELEPHONE (OUTPATIENT)
Dept: ADMINISTRATIVE | Facility: HOSPITAL | Age: 73
End: 2018-09-25

## 2018-09-25 NOTE — TELEPHONE ENCOUNTER
Telephone call to patient to schedule appt with Dr Cher Babin as per Dr Cher Babin patient needs to be seen ASAP with him patient within a week referred by a Dr Beatrice Dia Re: L Toe Cyanosis/ Necrosis keeps worsening

## 2018-10-03 ENCOUNTER — OFFICE VISIT (OUTPATIENT)
Dept: VASCULAR SURGERY | Facility: CLINIC | Age: 73
End: 2018-10-03
Payer: COMMERCIAL

## 2018-10-03 VITALS
TEMPERATURE: 98.2 F | HEART RATE: 72 BPM | RESPIRATION RATE: 18 BRPM | SYSTOLIC BLOOD PRESSURE: 192 MMHG | BODY MASS INDEX: 28.49 KG/M2 | WEIGHT: 199 LBS | HEIGHT: 70 IN | DIASTOLIC BLOOD PRESSURE: 88 MMHG

## 2018-10-03 DIAGNOSIS — I70.299 ATHEROSCLEROSIS OF ARTERY OF EXTREMITY WITH ULCERATION (HCC): Primary | Chronic | ICD-10-CM

## 2018-10-03 DIAGNOSIS — L97.909 ATHEROSCLEROSIS OF ARTERY OF EXTREMITY WITH ULCERATION (HCC): Primary | Chronic | ICD-10-CM

## 2018-10-03 PROCEDURE — 99204 OFFICE O/P NEW MOD 45 MIN: CPT | Performed by: SURGERY

## 2018-10-03 RX ORDER — ASPIRIN 81 MG/1
81 TABLET ORAL DAILY
COMMUNITY

## 2018-10-03 NOTE — ASSESSMENT & PLAN NOTE
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

## 2018-10-03 NOTE — PATIENT INSTRUCTIONS
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

## 2018-10-03 NOTE — PROGRESS NOTES
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 which has been nonhealing for the past 2 months  This is associated with significant pain and associated cyanosis  It is unclear whether this is of an atheroembolic verses an ischemic etiology  We discussed the options available and I have suggested proceeding with arteriography and possible endovascular intervention in the near future  At this point we will continue her current medical regiment which does include antiplatelet and statin therapy  Diagnoses and all orders for this visit:    Atherosclerosis of artery of extremity with ulceration ([address] )    Other orders  -     aspirin (ECOTRIN LOW STRENGTH) 81 mg EC tablet; Take 81 mg by mouth daily          Subjective:      Patient ID: Karley Disla is a 74 y o  female  Patient had VERN and AOIL on 7/19/2018  Patient was referred by Dr Andrew Ash  She has worsening necrosis of l great toe  Left foot is discolored and toe is cold to touch  It has pain in her left heel that she cannot walk on  She states if she does she has a burning pain that shoots up her leg  This has bee going on about 2 months  Patient takes ASA 81 and Lovastatin  59-year-old who originally presented in June of this year with digital cyanosis  At that time she was found have a palpable pedal pulse and no evidence of tissue loss thus no further intervention was pursued  Since that time, over the past 2 months, she has noticed a wound on her left heel which has become very painful especially with weight-bearing  This is associated with an area of cyanosis on her heel also  She notes no specific trauma or other inciting event  She denies any typical symptoms of claudication  Of note she has recently undergone surgery for skin cancer and is scheduled for treatment at the [facility] for a scalp lesion on 10/18/2018      Lower extremity arterial duplex and aortic duplex from 07/19/2018 show evidence of a right superficial femoral artery occlusion with ankle-brachial index was 0 56 and toe pressure of 99  On the left there is a greater than 75% stenosis in the superficial femoral artery with ankle-brachial index is 0 68 toe pressure 103  The following portions of the patient's history were reviewed and updated as appropriate: allergies, current medications, past family history, past medical history, past social history, past surgical history and problem list     The ROS as bellow was reviewed and changes made as indictated       Review of Systems   Constitutional: Negative  Negative for chills and fever  HENT: Positive for postnasal drip  Eyes: Negative  Respiratory: Negative  Cardiovascular: Negative  Negative for chest pain and leg swelling  Gastrointestinal: Negative  Negative for diarrhea and nausea  Endocrine: Negative  Negative for heat intolerance  Genitourinary: Negative  Negative for flank pain  Musculoskeletal: Positive for gait problem  Negative for back pain  Foot pain  Leg pain   Skin: Positive for color change  Allergic/Immunologic: Positive for food allergies  Neurological: Positive for light-headedness  Hematological: Negative  Does not bruise/bleed easily  Psychiatric/Behavioral: The patient is nervous/anxious  Objective:      BP (!) 192/88 (BP Location: Right arm, Patient Position: Sitting)   Pulse 72   Temp 98 2 °F (36 8 °C)   Resp 18   Ht 5' 10" (1 778 m)   Wt 90 3 kg (199 lb)   BMI 28 55 kg/m²          Physical Exam   Constitutional: She is oriented to person, place, and time  She appears well-developed and well-nourished  HENT:   Head: Normocephalic and atraumatic  Eyes: Conjunctivae and EOM are normal    Neck: Normal range of motion  Neck supple  No JVD present  Carotid bruit is not present     Cardiovascular: Normal rate, regular rhythm, S1 normal, S2 normal and normal heart sounds  No murmur heard  Pulses:       Carotid pulses are 2+ on the right side, and 2+ on the left side  Radial pulses are 2+ on the right side, and 2+ on the left side  Femoral pulses are 2+ on the right side, and 2+ on the left side  Popliteal pulses are 0 on the right side, and 0 on the left side  Dorsalis pedis pulses are 0 on the right side, and 1+ on the left side  Posterior tibial pulses are 0 on the right side, and 0 on the left side  The plantar aspect of the left foot to include the left toes are cyanotic as compared to the right  This is despite the fact that the left dorsalis pedis pulse is palpable  Pulmonary/Chest: Effort normal and breath sounds normal    Abdominal: Soft  Normal appearance and normal aorta  She exhibits no abdominal bruit and no pulsatile midline mass  There is no tenderness  No hernia  Musculoskeletal: Normal range of motion  She exhibits no edema, tenderness or deformity  Neurological: She is alert and oriented to person, place, and time  She has normal strength  No sensory deficit  Skin: Skin is warm and dry  No pallor  As noted above there is cyanosis of the left foot on the plantar aspect and toes  There is a wound 5-10 mm in size consistent with a skin crack or small ulceration on the lateral aspect of the plantar heel  Psychiatric: She has a normal mood and affect   Her speech is normal and behavior is normal

## 2018-10-03 NOTE — LETTER
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 which has been nonhealing for the past 2 months  This is associated with significant pain and associated cyanosis  It is unclear whether this is of an atheroembolic verses an ischemic etiology  We discussed the options available and I have suggested proceeding with arteriography and possible endovascular intervention in the near future  At this point we will continue her current medical regiment which does include antiplatelet and statin therapy  If you have questions, please do not hesitate to call me  I look forward to following Neris Rajan along with you           Sincerely,        Marc Solano MD        CC: Shani Mukherjee

## 2018-10-09 ENCOUNTER — DOCUMENTATION (OUTPATIENT)
Dept: VASCULAR SURGERY | Facility: CLINIC | Age: 73
End: 2018-10-09

## 2018-10-09 DIAGNOSIS — I70.25 ATHEROSCLEROSIS OF NATIVE ARTERY OF LOWER EXTREMITY WITH ULCERATION OF FOOT (HCC): Primary | ICD-10-CM

## 2018-10-09 DIAGNOSIS — L97.509 ATHEROSCLEROSIS OF NATIVE ARTERY OF LOWER EXTREMITY WITH ULCERATION OF FOOT (HCC): Primary | ICD-10-CM

## 2018-10-09 NOTE — PROGRESS NOTES
Noé Raymundo spoke with pt yesterday in the office and gave her the date for Agram at [facility] with Cathleen Swartz  Pt chooses to wait until Dr Frank Angulo is available to do Agram, which is 11/18/18  I am mailing to her all the Labs Rxs and an H&P form along with the Instructions

## 2018-11-02 ENCOUNTER — APPOINTMENT (OUTPATIENT)
Dept: LAB | Facility: MEDICAL CENTER | Age: 73
End: 2018-11-02
Payer: COMMERCIAL

## 2018-11-02 DIAGNOSIS — I70.299 ATHEROSCLEROSIS OF ARTERY OF EXTREMITY WITH ULCERATION (HCC): Chronic | ICD-10-CM

## 2018-11-02 DIAGNOSIS — L97.909 ATHEROSCLEROSIS OF ARTERY OF EXTREMITY WITH ULCERATION (HCC): Chronic | ICD-10-CM

## 2018-11-02 LAB
ANION GAP SERPL CALCULATED.3IONS-SCNC: 5 MMOL/L (ref 4–13)
BUN SERPL-MCNC: 16 MG/DL (ref 5–25)
CALCIUM SERPL-MCNC: 9.3 MG/DL (ref 8.3–10.1)
CHLORIDE SERPL-SCNC: 106 MMOL/L (ref 100–108)
CO2 SERPL-SCNC: 28 MMOL/L (ref 21–32)
CREAT SERPL-MCNC: 1.06 MG/DL (ref 0.6–1.3)
ERYTHROCYTE [DISTWIDTH] IN BLOOD BY AUTOMATED COUNT: 13 % (ref 11.6–15.1)
GFR SERPL CREATININE-BSD FRML MDRD: 52 ML/MIN/1.73SQ M
GLUCOSE P FAST SERPL-MCNC: 186 MG/DL (ref 65–99)
HCT VFR BLD AUTO: 43.6 % (ref 34.8–46.1)
HGB BLD-MCNC: 14.4 G/DL (ref 11.5–15.4)
INR PPP: 0.95 (ref 0.86–1.17)
MCH RBC QN AUTO: 31.7 PG (ref 26.8–34.3)
MCHC RBC AUTO-ENTMCNC: 33 G/DL (ref 31.4–37.4)
MCV RBC AUTO: 96 FL (ref 82–98)
PLATELET # BLD AUTO: 246 THOUSANDS/UL (ref 149–390)
PMV BLD AUTO: 11.6 FL (ref 8.9–12.7)
POTASSIUM SERPL-SCNC: 4 MMOL/L (ref 3.5–5.3)
PROTHROMBIN TIME: 12.8 SECONDS (ref 11.8–14.2)
RBC # BLD AUTO: 4.54 MILLION/UL (ref 3.81–5.12)
SODIUM SERPL-SCNC: 139 MMOL/L (ref 136–145)
WBC # BLD AUTO: 8.05 THOUSAND/UL (ref 4.31–10.16)

## 2018-11-02 PROCEDURE — 36415 COLL VENOUS BLD VENIPUNCTURE: CPT

## 2018-11-02 PROCEDURE — 85610 PROTHROMBIN TIME: CPT

## 2018-11-02 PROCEDURE — 85027 COMPLETE CBC AUTOMATED: CPT

## 2018-11-02 PROCEDURE — 80048 BASIC METABOLIC PNL TOTAL CA: CPT

## 2018-11-12 ENCOUNTER — TELEPHONE (OUTPATIENT)
Dept: RADIOLOGY | Facility: HOSPITAL | Age: 73
End: 2018-11-12

## 2018-11-12 RX ORDER — DIPHENHYDRAMINE HCL 25 MG
25 TABLET ORAL 2 TIMES DAILY
COMMUNITY

## 2018-11-12 RX ORDER — SODIUM CHLORIDE 9 MG/ML
75 INJECTION, SOLUTION INTRAVENOUS CONTINUOUS
Status: CANCELLED | OUTPATIENT
Start: 2018-11-12

## 2018-11-12 RX ORDER — ALPRAZOLAM 0.25 MG/1
0.25 TABLET ORAL AS NEEDED
COMMUNITY
Start: 2018-10-15

## 2018-11-14 ENCOUNTER — TELEPHONE (OUTPATIENT)
Dept: INPATIENT UNIT | Facility: HOSPITAL | Age: 73
End: 2018-11-14

## 2018-11-15 ENCOUNTER — HOSPITAL ENCOUNTER (OUTPATIENT)
Dept: RADIOLOGY | Facility: HOSPITAL | Age: 73
Discharge: HOME | End: 2018-11-15
Attending: SURGERY | Admitting: SURGERY
Payer: COMMERCIAL

## 2018-11-15 VITALS
RESPIRATION RATE: 16 BRPM | SYSTOLIC BLOOD PRESSURE: 175 MMHG | HEIGHT: 70 IN | OXYGEN SATURATION: 91 % | WEIGHT: 195 LBS | HEART RATE: 70 BPM | BODY MASS INDEX: 27.92 KG/M2 | DIASTOLIC BLOOD PRESSURE: 83 MMHG | TEMPERATURE: 97.2 F

## 2018-11-15 DIAGNOSIS — L97.909 ATHEROSCLEROSIS OF ARTERY OF EXTREMITY WITH ULCERATION (HCC): Primary | Chronic | ICD-10-CM

## 2018-11-15 DIAGNOSIS — I70.299 ATHEROSCLEROSIS OF ARTERY OF EXTREMITY WITH ULCERATION (HCC): Primary | Chronic | ICD-10-CM

## 2018-11-15 DIAGNOSIS — I70.25 ATHEROSCLEROSIS OF NATIVE ARTERY OF LOWER EXTREMITY WITH ULCERATION OF FOOT (HCC): ICD-10-CM

## 2018-11-15 DIAGNOSIS — L97.509 ATHEROSCLEROSIS OF NATIVE ARTERY OF LOWER EXTREMITY WITH ULCERATION OF FOOT (HCC): ICD-10-CM

## 2018-11-15 PROCEDURE — C1769 GUIDE WIRE: HCPCS

## 2018-11-15 PROCEDURE — 37224 PR REVSC OPN/PRG FEM/POP W/ANGIOPLASTY UNI: CPT | Performed by: SURGERY

## 2018-11-15 PROCEDURE — 99153 MOD SED SAME PHYS/QHP EA: CPT

## 2018-11-15 PROCEDURE — 76937 US GUIDE VASCULAR ACCESS: CPT

## 2018-11-15 PROCEDURE — 75710 ARTERY X-RAYS ARM/LEG: CPT | Performed by: SURGERY

## 2018-11-15 PROCEDURE — C1894 INTRO/SHEATH, NON-LASER: HCPCS

## 2018-11-15 PROCEDURE — 99152 MOD SED SAME PHYS/QHP 5/>YRS: CPT | Performed by: SURGERY

## 2018-11-15 PROCEDURE — C1760 CLOSURE DEV, VASC: HCPCS

## 2018-11-15 PROCEDURE — 99152 MOD SED SAME PHYS/QHP 5/>YRS: CPT

## 2018-11-15 PROCEDURE — C1725 CATH, TRANSLUMIN NON-LASER: HCPCS

## 2018-11-15 PROCEDURE — 75710 ARTERY X-RAYS ARM/LEG: CPT

## 2018-11-15 PROCEDURE — C1887 CATHETER, GUIDING: HCPCS

## 2018-11-15 PROCEDURE — C2623 CATH, TRANSLUMIN, DRUG-COAT: HCPCS

## 2018-11-15 PROCEDURE — 75625 CONTRAST EXAM ABDOMINL AORTA: CPT

## 2018-11-15 PROCEDURE — 75625 CONTRAST EXAM ABDOMINL AORTA: CPT | Performed by: SURGERY

## 2018-11-15 PROCEDURE — 37224 HB FEM/POPL REVAS W/TLA: CPT

## 2018-11-15 RX ORDER — CLOPIDOGREL BISULFATE 75 MG/1
75 TABLET ORAL DAILY
Qty: 30 TABLET | Refills: 2 | Status: SHIPPED | OUTPATIENT
Start: 2018-11-15 | End: 2018-11-16 | Stop reason: SDUPTHER

## 2018-11-15 RX ORDER — SODIUM CHLORIDE 9 MG/ML
50 INJECTION, SOLUTION INTRAVENOUS CONTINUOUS
Status: DISCONTINUED | OUTPATIENT
Start: 2018-11-15 | End: 2018-11-15 | Stop reason: HOSPADM

## 2018-11-15 RX ORDER — MIDAZOLAM HYDROCHLORIDE 1 MG/ML
INJECTION INTRAMUSCULAR; INTRAVENOUS CODE/TRAUMA/SEDATION MEDICATION
Status: COMPLETED | OUTPATIENT
Start: 2018-11-15 | End: 2018-11-15

## 2018-11-15 RX ORDER — LABETALOL HYDROCHLORIDE 5 MG/ML
INJECTION, SOLUTION INTRAVENOUS CODE/TRAUMA/SEDATION MEDICATION
Status: COMPLETED | OUTPATIENT
Start: 2018-11-15 | End: 2018-11-15

## 2018-11-15 RX ORDER — HEPARIN SODIUM 1000 [USP'U]/ML
INJECTION, SOLUTION INTRAVENOUS; SUBCUTANEOUS CODE/TRAUMA/SEDATION MEDICATION
Status: COMPLETED | OUTPATIENT
Start: 2018-11-15 | End: 2018-11-15

## 2018-11-15 RX ORDER — SODIUM CHLORIDE 9 MG/ML
75 INJECTION, SOLUTION INTRAVENOUS CONTINUOUS
Status: DISCONTINUED | OUTPATIENT
Start: 2018-11-15 | End: 2018-11-15 | Stop reason: HOSPADM

## 2018-11-15 RX ORDER — CLOPIDOGREL BISULFATE 75 MG/1
75 TABLET ORAL DAILY
Status: DISCONTINUED | OUTPATIENT
Start: 2018-11-15 | End: 2018-11-15 | Stop reason: HOSPADM

## 2018-11-15 RX ORDER — CLOPIDOGREL BISULFATE 75 MG/1
75 TABLET ORAL DAILY
Qty: 7 TABLET | Refills: 0 | Status: SHIPPED | OUTPATIENT
Start: 2018-11-16 | End: 2018-11-16

## 2018-11-15 RX ORDER — FENTANYL CITRATE 50 UG/ML
INJECTION, SOLUTION INTRAMUSCULAR; INTRAVENOUS CODE/TRAUMA/SEDATION MEDICATION
Status: COMPLETED | OUTPATIENT
Start: 2018-11-15 | End: 2018-11-15

## 2018-11-15 RX ADMIN — CLOPIDOGREL BISULFATE 75 MG: 75 TABLET ORAL at 15:06

## 2018-11-15 RX ADMIN — FENTANYL CITRATE 50 MCG: 50 INJECTION, SOLUTION INTRAMUSCULAR; INTRAVENOUS at 10:07

## 2018-11-15 RX ADMIN — FENTANYL CITRATE 25 MCG: 50 INJECTION, SOLUTION INTRAMUSCULAR; INTRAVENOUS at 11:01

## 2018-11-15 RX ADMIN — MIDAZOLAM 0.5 MG: 1 INJECTION INTRAMUSCULAR; INTRAVENOUS at 11:09

## 2018-11-15 RX ADMIN — IODIXANOL 75 ML: 320 INJECTION, SOLUTION INTRAVASCULAR at 11:32

## 2018-11-15 RX ADMIN — MIDAZOLAM 0.5 MG: 1 INJECTION INTRAMUSCULAR; INTRAVENOUS at 11:01

## 2018-11-15 RX ADMIN — FENTANYL CITRATE 25 MCG: 50 INJECTION, SOLUTION INTRAMUSCULAR; INTRAVENOUS at 11:09

## 2018-11-15 RX ADMIN — SODIUM CHLORIDE 75 ML/HR: 0.9 INJECTION, SOLUTION INTRAVENOUS at 08:39

## 2018-11-15 RX ADMIN — MIDAZOLAM 1 MG: 1 INJECTION INTRAMUSCULAR; INTRAVENOUS at 10:07

## 2018-11-15 RX ADMIN — MIDAZOLAM 1 MG: 1 INJECTION INTRAMUSCULAR; INTRAVENOUS at 10:01

## 2018-11-15 RX ADMIN — MIDAZOLAM 1 MG: 1 INJECTION INTRAMUSCULAR; INTRAVENOUS at 10:48

## 2018-11-15 RX ADMIN — HEPARIN SODIUM 3000 UNITS: 1000 INJECTION INTRAVENOUS; SUBCUTANEOUS at 10:36

## 2018-11-15 RX ADMIN — LABETALOL HYDROCHLORIDE 10 MG: 5 INJECTION, SOLUTION INTRAVENOUS at 11:08

## 2018-11-15 RX ADMIN — FENTANYL CITRATE 50 MCG: 50 INJECTION, SOLUTION INTRAMUSCULAR; INTRAVENOUS at 10:30

## 2018-11-15 RX ADMIN — FENTANYL CITRATE 50 MCG: 50 INJECTION, SOLUTION INTRAMUSCULAR; INTRAVENOUS at 10:48

## 2018-11-15 RX ADMIN — FENTANYL CITRATE 50 MCG: 50 INJECTION, SOLUTION INTRAMUSCULAR; INTRAVENOUS at 10:18

## 2018-11-15 RX ADMIN — FENTANYL CITRATE 50 MCG: 50 INJECTION, SOLUTION INTRAMUSCULAR; INTRAVENOUS at 10:01

## 2018-11-15 NOTE — OP NOTE
OPERATIVE REPORT  PATIENT NAME: Brad Wong    :  1945  MRN: [id number]  Pt Location:  Interventional Radiology    SURGERY DATE:   11/15/2018    Preoperative and postoperative diagnosis:  Atherosclerotic occlusive disease with left heel ulceration and rest pain  Procedure:  1  Aortogram with left lower extremity runoff  2  Left superficial femoral artery angioplasty with 5 x 200 mm drug-eluting balloon  3  Ultrasound-guided right common femoral artery puncture    Surgeon:  Nadia Appiah MD        Flouro Time: 16 5 min    Contrast: 75cc Visipaque 320    Sedation Time: 75min    Estimated Blood Loss:   Minimal    Anesthesia Type:   Sedation    Operative Indications:  74year-old with an area of cyanosis with skin cracks and severe pain affecting the left heel which has failed to heal despite local care  Duplex imaging shows evidence of high-grade stenosis of the superficial femoral artery  Operative Findings:  Severe disease throughout the superficial femoral artery with 2 focal areas of greater than 75% stenosis both successfully treated with 5 mm balloon angioplasty with a drug-eluting balloon  At the conclusion of the procedure the patient had palpable pedal pulses  Complications:   None    Procedure and Technique:    IV sedation was administered  The right groin was prepped and draped in the normal sterile fashion and Ioban drape was placed  Ultrasound guidance was utilized to puncture the right common femoral artery  A micropuncture system was utilized  The common femoral artery was directly imaged under B-mode imaging  A Cardiva Medical wire was then placed and a sheath was inserted  A flush catheter was positioned at the L1 vertebral body and flush aortography was performed  The catheter was then positioned at the aortic bifurcation and oblique images were obtained of the iliac arteries  The left common iliac artery was then cannulated with a Contra catheter    The catheter was then advanced into the distal external iliac artery  Oblique images were obtained of the femoral bifurcation  Runoff arteriography was then performed  This include a lateral image of the knee secondary to a knee prosthesis  The superficial femoral artery was then cannulated and a 6 Guamanian sheath was positioned in the distal common femoral artery  An angled Glidewire was then used to traverse the superficial femoral artery and exchanged for a Storq wire which was position in the popliteal artery  3000 units of IV heparin were administered  Roadmap images of the superficial femoral artery were obtained and a 5 x 150 mm balloon was used to angioplasty the superficial femoral artery  Completion images were obtained then final angioplasty was performed with a 5 x 200 mm drug-eluting balloon  Completion images were obtained to include a lateral image of the foot  All guidewires and catheters were withdrawn and a Star closure was used to close the right femoral puncture site  Findings:    Flush aortogram shows wide patency bilateral main renal arteries  The mesenteric arteries are patent but ostia are not visualized in this obliquity  The infrarenal aorta and bilateral iliac segments are widely patent without evidence of significant stenosis  The right common femoral artery and proximal superficial deep femoral arteries are widely patent without significant stenosis  There is only a mild stenosis at the origin the superficial femoral artery  Runoff arteriography on the left shows wide patency of the common and deep femoral arteries  The superficial femoral artery is patent with significant disease throughout its mid and distal segments with 2 areas of focal stenosis of greater than 75%  The above knee and below-knee popliteal arteries are then patent without significant disease with 3 vessel runoff to the foot      Following initial angioplasty there is marked improvement without significant residual stenosis  Final angioplasty with a 5 x 200 mm drug-eluting balloon resulted in wide patency of the superficial femoral artery without evidence of residual stenosis and rapid outflow and filling of the tibial vessels  Both the dorsalis pedis and posterior tibial arteries filled normally on the foot  Following closure of the right common femoral artery with a Star closure device there was no evidence hematoma  The patient was noted to have a palpable pedal pulse on the left at the conclusion of the procedure  Conclusion:    Severe disease of the superficial femoral artery with multiple areas of greater than 75% stenosis successfully treated with 5 x 200 mm drug-eluting balloon angioplasty without significant residual stenosis  There is then patent tibial runoff to the foot  At the conclusion of the procedure the patient had palpable pedal pulses on the left       I was present for the entire procedure    Patient Disposition:  PACU     SIGNATURE: Moshe Mejia MD  DATE: November 15, 2018  TIME: 11:36 AM

## 2018-11-15 NOTE — DISCHARGE INSTRUCTIONS
ARTERIOGRAM    WHAT YOU SHOULD KNOW:   An angiogram is a procedure to look at arteries in your body  Arteries are the blood vessels that carry blood from your heart to your body  AFTER YOU LEAVE:     Self-care:   · Limit activity: Rest for the remainder of the day of your procedure  Have some one with you until the next morning  Keep your arm or leg straight as much as possible  Rest as much as possible, sitting lying or reclining  Walk only to go to the bathroom, to bed or to eat  If the angiogram catheter was put in your leg, use the stairs as little as possible  No driving  · Keep your wound clean and dry  You may shower 24 hours after your procedure  The bandage you have on should fall off in 2-3 days  If there is any drainage from the puncture site, you should put on a clean bandage  · Watch for bleeding and bruising: It is normal to have a bruise and soreness where the angiogram catheter went in  · Diet:   · You may resume your regular diet, Sips of flat soda will help with mild nausea  · Drink more liquids than usual for the next 24 hours      · IMMEDIATELY Contact Interventional Radiology at [phone] Zach PATIENTS: Contact Interventional Radiology at [phone]) Luan Shah PATIENTS: Contact Interventional Radiology at [phone]) if any of the following occur:  · If your bruise gets larger or if you notice any active bleeding  APPLY DIRECT PRESSURE TO THE BLEEDING SITE  · If you notice increased swelling or have increased pain at the puncture site   · If you have any numbness or pain in the extremity of the puncture site   · If that extremity seems cold or pale      · You have fever greater than 101  · Persistent nausea or vomitting    Follow up with your primary healthcare provider  as directed: Write down your questions so you remember to ask them during your visits

## 2018-11-16 DIAGNOSIS — I70.299 ATHEROSCLEROSIS OF ARTERY OF EXTREMITY WITH ULCERATION (HCC): Chronic | ICD-10-CM

## 2018-11-16 DIAGNOSIS — L97.909 ATHEROSCLEROSIS OF ARTERY OF EXTREMITY WITH ULCERATION (HCC): Chronic | ICD-10-CM

## 2018-11-16 DIAGNOSIS — I70.25 ATHEROSCLEROSIS OF NATIVE ARTERY OF LOWER EXTREMITY WITH ULCERATION OF FOOT (HCC): ICD-10-CM

## 2018-11-16 DIAGNOSIS — L97.509 ATHEROSCLEROSIS OF NATIVE ARTERY OF LOWER EXTREMITY WITH ULCERATION OF FOOT (HCC): ICD-10-CM

## 2018-11-18 RX ORDER — CLOPIDOGREL BISULFATE 75 MG/1
75 TABLET ORAL DAILY
Qty: 30 TABLET | Refills: 0 | Status: SHIPPED | OUTPATIENT
Start: 2018-11-18 | End: 2018-11-20 | Stop reason: SDUPTHER

## 2018-11-20 DIAGNOSIS — L97.909 ATHEROSCLEROSIS OF ARTERY OF EXTREMITY WITH ULCERATION (HCC): Chronic | ICD-10-CM

## 2018-11-20 DIAGNOSIS — I70.25 ATHEROSCLEROSIS OF NATIVE ARTERY OF LOWER EXTREMITY WITH ULCERATION OF FOOT (HCC): ICD-10-CM

## 2018-11-20 DIAGNOSIS — I70.299 ATHEROSCLEROSIS OF ARTERY OF EXTREMITY WITH ULCERATION (HCC): Chronic | ICD-10-CM

## 2018-11-20 DIAGNOSIS — L97.509 ATHEROSCLEROSIS OF NATIVE ARTERY OF LOWER EXTREMITY WITH ULCERATION OF FOOT (HCC): ICD-10-CM

## 2018-11-20 RX ORDER — CLOPIDOGREL BISULFATE 75 MG/1
75 TABLET ORAL DAILY
Qty: 7 TABLET | Refills: 0 | Status: SHIPPED | OUTPATIENT
Start: 2018-11-20 | End: 2018-12-04 | Stop reason: SDUPTHER

## 2018-11-20 NOTE — TELEPHONE ENCOUNTER
Pt called, [facility] and she will not receive until Saturday  She only has enough to last until Thursday and [facility] have [facility] day supply sent to local pharmacy, [facility] in [place]

## 2018-12-03 NOTE — PROGRESS NOTES
Assessment/Plan:    Atherosclerosis of artery of extremity with ulceration (HCC)  Atherosclerosis with left heel ulceration  Patient status post angioplasty of multiple severe superficial femoral artery stenoses  Grossly on follow-up exam her perfusion is adequate from a large vessel standpoint but there is no significant improvement in the wound  This is suggestive of an atheroembolic etiology  From a vascular standpoint no further intervention is planned other than standard ultrasound follow-up to better quantitate baseline perfusion and healing potential   She will be asked to continue follow-up with her podiatrist for local wound care recommendations  We will continue Plavix therapy for 3 months  Diagnoses and all orders for this visit:    Atherosclerosis of artery of extremity with ulceration (HCC)  -     clopidogrel (PLAVIX) 75 mg tablet; Take 1 tablet (75 mg total) by mouth daily for 90 days  -     VAS lower limb arterial duplex, limited/unilateral; Future    Atherosclerosis of native artery of lower extremity with ulceration of foot (HCC)  -     clopidogrel (PLAVIX) 75 mg tablet; Take 1 tablet (75 mg total) by mouth daily for 90 days           Patient ID: Damián Rees is a 74 y o  female  Chief complaint: Pt is here to discuss AGRAM done 11/15/18 done Dr Gage Escobedo  Pt has wound to left heel  Pt has had wound for a couple months  Pt admits to pain to left foot  Pt is on asa and plavix  38-year-old female presents in follow-up of arteriogram 11/15/2018  This was performed for treatment of arterial insufficiency and associated left heel crack/ulceration that failed conservative management  At the time of arteriography she was found to have multiple areas of focal stenosis throughout the superficial femoral artery successfully treated with balloon angioplasty using a drug-eluting balloon    There is then outflow via anterior and posterior tibial arteries with the anterior tibial artery being the dominant outflow vessel  On evaluation today she states there has been no significant change and she still complains of pain at the wound site  She denies any numbness or claudication symptoms  She denies any discomfort at the puncture site  The following portions of the patient's history were reviewed and updated as appropriate: allergies, current medications, past family history, past medical history, past social history, past surgical history and problem list     The ROS as bellow was reviewed and changes made as indictated       Review of Systems   Constitutional: Positive for activity change  HENT: Positive for facial swelling and postnasal drip  Eyes: Negative  Respiratory: Negative  Cardiovascular: Negative  Gastrointestinal: Negative  Endocrine: Negative  Genitourinary: Negative  Musculoskeletal: Negative  Leg pain  Leg cramping     Skin: Positive for wound  Allergic/Immunologic: Negative  Neurological: Positive for light-headedness  Hematological: Negative  Psychiatric/Behavioral: Negative  Objective:      BP (!) 220/112 (BP Location: Left arm, Patient Position: Sitting, Cuff Size: Adult)   Pulse (!) 119   Temp 98 3 °F (36 8 °C) (Tympanic)   Ht 5' 10" (1 778 m)   Wt 91 2 kg (201 lb)   BMI 28 84 kg/m²          Physical Exam   Constitutional: She is oriented to person, place, and time  She appears well-developed and well-nourished  Cardiovascular: Normal rate  Pulses:       Femoral pulses are 2+ on the right side, and 2+ on the left side  Popliteal pulses are 2+ on the left side  Dorsalis pedis pulses are 1+ on the left side  Posterior tibial pulses are 2+ on the left side  Musculoskeletal: Normal range of motion  She exhibits no edema  Neurological: She is alert and oriented to person, place, and time  Skin: Skin is warm     Left heel wound/crack with surrounding mottled appearance unchanged from previous exam  This is consistent with an atheroembolic etiology

## 2018-12-04 ENCOUNTER — OFFICE VISIT (OUTPATIENT)
Dept: VASCULAR SURGERY | Facility: CLINIC | Age: 73
End: 2018-12-04
Payer: COMMERCIAL

## 2018-12-04 VITALS
DIASTOLIC BLOOD PRESSURE: 112 MMHG | HEART RATE: 119 BPM | HEIGHT: 70 IN | SYSTOLIC BLOOD PRESSURE: 220 MMHG | BODY MASS INDEX: 28.77 KG/M2 | WEIGHT: 201 LBS | TEMPERATURE: 98.3 F

## 2018-12-04 DIAGNOSIS — I70.25 ATHEROSCLEROSIS OF NATIVE ARTERY OF LOWER EXTREMITY WITH ULCERATION OF FOOT (HCC): ICD-10-CM

## 2018-12-04 DIAGNOSIS — I70.299 ATHEROSCLEROSIS OF ARTERY OF EXTREMITY WITH ULCERATION (HCC): Primary | Chronic | ICD-10-CM

## 2018-12-04 DIAGNOSIS — L97.509 ATHEROSCLEROSIS OF NATIVE ARTERY OF LOWER EXTREMITY WITH ULCERATION OF FOOT (HCC): ICD-10-CM

## 2018-12-04 DIAGNOSIS — L97.909 ATHEROSCLEROSIS OF ARTERY OF EXTREMITY WITH ULCERATION (HCC): Primary | Chronic | ICD-10-CM

## 2018-12-04 PROCEDURE — 99214 OFFICE O/P EST MOD 30 MIN: CPT | Performed by: SURGERY

## 2018-12-04 RX ORDER — CLOPIDOGREL BISULFATE 75 MG/1
75 TABLET ORAL DAILY
Qty: 90 TABLET | Refills: 0 | Status: SHIPPED | OUTPATIENT
Start: 2018-12-04 | End: 2020-02-18 | Stop reason: ALTCHOICE

## 2018-12-04 NOTE — LETTER
December 4, 2018     [address]    Patient: Jacinta Gomez   YOB: 1945   Date of Visit: 12/4/2018       Dear Dr Randa Washington: Thank you for referring Jacinta Gomez to me for evaluation  Below are the relevant portions of my assessment and plan of care  Atherosclerosis of artery of extremity with ulceration (HCC)  Atherosclerosis with left heel ulceration  Patient status post angioplasty of multiple severe superficial femoral artery stenoses  Grossly on follow-up exam her perfusion is adequate from a large vessel standpoint but there is no significant improvement in the wound  This is suggestive of an atheroembolic etiology  From a vascular standpoint no further intervention is planned other than standard ultrasound follow-up to better quantitate baseline perfusion and healing potential   She will be asked to continue follow-up with her podiatrist for local wound care recommendations  If you have questions, please do not hesitate to call me  I look forward to following Wesley Alves along with you           Sincerely,        William Kim MD        CC: Hector Eldridge DPM

## 2018-12-04 NOTE — ASSESSMENT & PLAN NOTE
Atherosclerosis with left heel ulceration  Patient status post angioplasty of multiple severe superficial femoral artery stenoses  Grossly on follow-up exam her perfusion is adequate from a large vessel standpoint but there is no significant improvement in the wound  This is suggestive of an atheroembolic etiology  From a vascular standpoint no further intervention is planned other than standard ultrasound follow-up to better quantitate baseline perfusion and healing potential   She will be asked to continue follow-up with her podiatrist for local wound care recommendations  We will continue Plavix therapy for 3 months

## 2018-12-04 NOTE — PATIENT INSTRUCTIONS
Atherosclerosis of artery of extremity with ulceration (HCC)  Atherosclerosis with left heel ulceration  Patient status post angioplasty of multiple severe superficial femoral artery stenoses  Grossly on follow-up exam her perfusion is adequate from a large vessel standpoint but there is no significant improvement in the wound  This is suggestive of an atheroembolic etiology  From a vascular standpoint no further intervention is planned other than standard ultrasound follow-up to better quantitate baseline perfusion and healing potential   She will be asked to continue follow-up with her podiatrist for local wound care recommendations  We will continue Plavix therapy for 3 months

## 2018-12-11 ENCOUNTER — APPOINTMENT (OUTPATIENT)
Dept: LAB | Facility: MEDICAL CENTER | Age: 73
End: 2018-12-11
Payer: COMMERCIAL

## 2018-12-11 ENCOUNTER — TRANSCRIBE ORDERS (OUTPATIENT)
Dept: ADMINISTRATIVE | Facility: HOSPITAL | Age: 73
End: 2018-12-11
Payer: COMMERCIAL

## 2018-12-11 DIAGNOSIS — R26.9 ABNORMALITY OF GAIT: Primary | ICD-10-CM

## 2018-12-11 LAB
ANION GAP SERPL CALCULATED.3IONS-SCNC: 10 MMOL/L (ref 4–13)
ATRIAL RATE: 80 BPM
BASOPHILS # BLD AUTO: 0.06 THOUSANDS/ΜL (ref 0–0.1)
BASOPHILS NFR BLD AUTO: 1 % (ref 0–1)
BUN SERPL-MCNC: 19 MG/DL (ref 5–25)
CALCIUM SERPL-MCNC: 8.5 MG/DL (ref 8.3–10.1)
CHLORIDE SERPL-SCNC: 104 MMOL/L (ref 100–108)
CO2 SERPL-SCNC: 25 MMOL/L (ref 21–32)
CREAT SERPL-MCNC: 1.25 MG/DL (ref 0.6–1.3)
EOSINOPHIL # BLD AUTO: 0.3 THOUSAND/ΜL (ref 0–0.61)
EOSINOPHIL NFR BLD AUTO: 4 % (ref 0–6)
ERYTHROCYTE [DISTWIDTH] IN BLOOD BY AUTOMATED COUNT: 12.6 % (ref 11.6–15.1)
GFR SERPL CREATININE-BSD FRML MDRD: 42 ML/MIN/1.73SQ M
GLUCOSE SERPL-MCNC: 345 MG/DL (ref 65–140)
HCT VFR BLD AUTO: 44.8 % (ref 34.8–46.1)
HGB BLD-MCNC: 14.6 G/DL (ref 11.5–15.4)
IMM GRANULOCYTES # BLD AUTO: 0.03 THOUSAND/UL (ref 0–0.2)
IMM GRANULOCYTES NFR BLD AUTO: 0 % (ref 0–2)
LYMPHOCYTES # BLD AUTO: 1.9 THOUSANDS/ΜL (ref 0.6–4.47)
LYMPHOCYTES NFR BLD AUTO: 24 % (ref 14–44)
MCH RBC QN AUTO: 31.9 PG (ref 26.8–34.3)
MCHC RBC AUTO-ENTMCNC: 32.6 G/DL (ref 31.4–37.4)
MCV RBC AUTO: 98 FL (ref 82–98)
MONOCYTES # BLD AUTO: 0.77 THOUSAND/ΜL (ref 0.17–1.22)
MONOCYTES NFR BLD AUTO: 10 % (ref 4–12)
NEUTROPHILS # BLD AUTO: 4.81 THOUSANDS/ΜL (ref 1.85–7.62)
NEUTS SEG NFR BLD AUTO: 61 % (ref 43–75)
NRBC BLD AUTO-RTO: 0 /100 WBCS
P AXIS: 71 DEGREES
PLATELET # BLD AUTO: 237 THOUSANDS/UL (ref 149–390)
PMV BLD AUTO: 12.2 FL (ref 8.9–12.7)
POTASSIUM SERPL-SCNC: 4.1 MMOL/L (ref 3.5–5.3)
PR INTERVAL: 178 MS
QRS AXIS: 26 DEGREES
QRSD INTERVAL: 74 MS
QT INTERVAL: 348 MS
QTC INTERVAL: 401 MS
RBC # BLD AUTO: 4.58 MILLION/UL (ref 3.81–5.12)
SODIUM SERPL-SCNC: 139 MMOL/L (ref 136–145)
T WAVE AXIS: 14 DEGREES
VENTRICULAR RATE: 80 BPM
WBC # BLD AUTO: 7.87 THOUSAND/UL (ref 4.31–10.16)

## 2018-12-11 PROCEDURE — 93010 ELECTROCARDIOGRAM REPORT: CPT | Performed by: PODIATRIST

## 2018-12-11 PROCEDURE — 85025 COMPLETE CBC W/AUTO DIFF WBC: CPT | Performed by: PODIATRIST

## 2018-12-11 PROCEDURE — 93005 ELECTROCARDIOGRAM TRACING: CPT | Performed by: PODIATRIST

## 2018-12-11 PROCEDURE — 36415 COLL VENOUS BLD VENIPUNCTURE: CPT | Performed by: PODIATRIST

## 2018-12-11 PROCEDURE — 80048 BASIC METABOLIC PNL TOTAL CA: CPT | Performed by: PODIATRIST

## 2018-12-14 ENCOUNTER — HOSPITAL ENCOUNTER (OUTPATIENT)
Dept: NON INVASIVE DIAGNOSTICS | Facility: CLINIC | Age: 73
Discharge: HOME | End: 2018-12-14
Payer: COMMERCIAL

## 2018-12-14 DIAGNOSIS — I70.299 ATHEROSCLEROSIS OF ARTERY OF EXTREMITY WITH ULCERATION (HCC): Chronic | ICD-10-CM

## 2018-12-14 DIAGNOSIS — L97.909 ATHEROSCLEROSIS OF ARTERY OF EXTREMITY WITH ULCERATION (HCC): Chronic | ICD-10-CM

## 2018-12-14 PROCEDURE — 93926 LOWER EXTREMITY STUDY: CPT

## 2018-12-17 PROCEDURE — 93926 LOWER EXTREMITY STUDY: CPT | Performed by: SURGERY

## 2018-12-17 PROCEDURE — 93922 UPR/L XTREMITY ART 2 LEVELS: CPT | Performed by: SURGERY

## 2019-01-11 ENCOUNTER — APPOINTMENT (OUTPATIENT)
Dept: LAB | Facility: MEDICAL CENTER | Age: 74
End: 2019-01-11
Payer: COMMERCIAL

## 2019-01-11 ENCOUNTER — TRANSCRIBE ORDERS (OUTPATIENT)
Dept: ADMINISTRATIVE | Facility: HOSPITAL | Age: 74
End: 2019-01-11

## 2019-01-11 DIAGNOSIS — E11.9 DIABETES MELLITUS WITHOUT COMPLICATION (HCC): ICD-10-CM

## 2019-01-11 DIAGNOSIS — E78.00 PURE HYPERCHOLESTEROLEMIA: Primary | ICD-10-CM

## 2019-01-11 LAB
CREAT UR-MCNC: 132 MG/DL
EST. AVERAGE GLUCOSE BLD GHB EST-MCNC: 209 MG/DL
GLUCOSE P FAST SERPL-MCNC: 179 MG/DL (ref 65–99)
HBA1C MFR BLD: 8.9 % (ref 4.2–6.3)
MICROALBUMIN UR-MCNC: 93.7 MG/L (ref 0–20)
MICROALBUMIN/CREAT 24H UR: 71 MG/G CREATININE (ref 0–30)

## 2019-01-11 PROCEDURE — 82570 ASSAY OF URINE CREATININE: CPT | Performed by: FAMILY MEDICINE

## 2019-01-11 PROCEDURE — 36415 COLL VENOUS BLD VENIPUNCTURE: CPT | Performed by: FAMILY MEDICINE

## 2019-01-11 PROCEDURE — 83036 HEMOGLOBIN GLYCOSYLATED A1C: CPT | Performed by: FAMILY MEDICINE

## 2019-01-11 PROCEDURE — 82043 UR ALBUMIN QUANTITATIVE: CPT | Performed by: FAMILY MEDICINE

## 2019-01-11 PROCEDURE — 82947 ASSAY GLUCOSE BLOOD QUANT: CPT | Performed by: FAMILY MEDICINE

## 2019-01-16 ENCOUNTER — OFFICE VISIT (OUTPATIENT)
Dept: VASCULAR SURGERY | Facility: CLINIC | Age: 74
End: 2019-01-16
Payer: COMMERCIAL

## 2019-01-16 VITALS
BODY MASS INDEX: 28.77 KG/M2 | HEART RATE: 94 BPM | RESPIRATION RATE: 18 BRPM | DIASTOLIC BLOOD PRESSURE: 88 MMHG | TEMPERATURE: 97.1 F | HEIGHT: 70 IN | WEIGHT: 201 LBS | SYSTOLIC BLOOD PRESSURE: 178 MMHG

## 2019-01-16 DIAGNOSIS — I70.299 ATHEROSCLEROSIS OF ARTERY OF EXTREMITY WITH ULCERATION (HCC): Primary | Chronic | ICD-10-CM

## 2019-01-16 DIAGNOSIS — L97.909 ATHEROSCLEROSIS OF ARTERY OF EXTREMITY WITH ULCERATION (HCC): Primary | Chronic | ICD-10-CM

## 2019-01-16 PROCEDURE — 99213 OFFICE O/P EST LOW 20 MIN: CPT | Performed by: NURSE PRACTITIONER

## 2019-01-16 NOTE — ASSESSMENT & PLAN NOTE
-s/p angiogram w/ DCB angioplasty to SFA stenoses on 11/15/18 by Dr Rohan Powell  Post intervention JOANA 1 17/211/100  Easily palpable DP/PT pulses  -continue Plavix x90 days (3/4/19) and aspirin + statin indefinitely   -s/p I&D left foot ulcer by podiatry on 12/19  Left foot wound is nearly healed (see clinical images)  Patient reports pain has resolved  -followup with podiatry as scheduled  -significant PAD to RLE    At this time patient denies any claudication symptoms  -will monitor with routine LEAD  -followup in 1 year, notify office prior with development of any pain or wounds

## 2019-01-16 NOTE — PROGRESS NOTES
Assessment/Plan:    Atherosclerosis of artery of extremity with ulceration (HCC)  -s/p angiogram w/ DCB angioplasty to SFA stenoses on 11/15/18 by Dr Nimisha Lindsay  Post intervention JOANA 1 17/211/100  Easily palpable DP/PT pulses  -continue Plavix x90 days (3/4/19) and aspirin + statin indefinitely   -s/p I&D left foot ulcer by podiatry on 12/19  Left foot wound is nearly healed (see clinical images)  Patient reports pain has resolved  -followup with podiatry as scheduled  -significant PAD to RLE  At this time patient denies any claudication symptoms  -will monitor with routine LEAD  -followup in 1 year, notify office prior with development of any pain or wounds       Diagnoses and all orders for this visit:    Atherosclerosis of artery of extremity with ulceration (HCC)  -     VAS lower limb arterial duplex, complete bilateral; Future          Subjective:      Patient ID: Ernesto Pete is a 74 y o  female  Patient had VERN 12/14/18  Patient had AGRAM on 11/15/18 by Dr Nimisha Lindsay  Patient had foot surgery 12/19 by Dr Tulio Martinez from [facility]  Ulcer on Heel is healled except some scabbing and sutures removed 3rd of January  Patient denies pain numbness or tingling  Patient takes ASA Plavix and Statin  Patient seen for left foot wound recheck  She has significant PAD with wound to left heel  Status post angiogram with multiple DCB angioplasties to SFA stenosis on 11/15/18  Status post I&D left heel wound by podiatry on 12/19/18  She states that her left heel pain has completely resolved  Wound is nearly healed  She has routine follow-up the with Podiatry scheduled on 2/4/2019  LEAD with contralateral disease  She denies any claudication symptoms  Taking Plavix x90 days, aspirin and statin          The following portions of the patient's history were reviewed and updated as appropriate: allergies, current medications, past family history, past medical history, past social history, past surgical history and problem list     Review of Systems   Constitutional: Negative  HENT: Negative  Eyes: Negative  Respiratory: Negative  Cardiovascular: Negative  Gastrointestinal: Negative  Endocrine: Negative  Genitourinary: Negative  Musculoskeletal: Negative  Skin: Negative  Allergic/Immunologic: Negative  Neurological: Negative  Hematological: Negative  Psychiatric/Behavioral: Negative  Objective:       Physical Exam   Constitutional: She is oriented to person, place, and time  No distress  HENT:   Head: Normocephalic and atraumatic  Eyes: No scleral icterus  Neck: Neck supple  No JVD present  Cardiovascular: Normal rate and regular rhythm  Pulses:       Femoral pulses are 2+ on the right side, and 2+ on the left side  Dorsalis pedis pulses are 0 on the right side, and 2+ on the left side  Posterior tibial pulses are 0 on the right side, and 2+ on the left side  Pulmonary/Chest: Effort normal  No respiratory distress  Musculoskeletal: She exhibits no edema  Neurological: She is alert and oriented to person, place, and time  Skin:   Left heel wound with dry skin and small dry eschar, no signs infection (see clinical images)   Psychiatric: She has a normal mood and affect               Vitals:    01/16/19 1045   BP: (!) 178/88   BP Location: Right arm   Patient Position: Sitting   Pulse: 94   Resp: 18   Temp: (!) 97 1 °F (36 2 °C)   Weight: 91 2 kg (201 lb)   Height: 5' 10" (1 778 m)       Patient Active Problem List   Diagnosis    Toe cyanosis    Atherosclerosis of artery of extremity with ulceration (HCC)       Past Surgical History:   Procedure Laterality Date    HYSTERECTOMY      IR ABDOMINAL ANGIOGRAPHY / INTERVENTION  11/15/2018    MASTECTOMY      REPLACEMENT TOTAL KNEE Left        Family History   Problem Relation Age of Onset    Diabetes Mother     Hypertension Mother     Atrial fibrillation Father        Social History     Social History    Marital status: /Civil Union     Spouse name: N/A    Number of children: N/A    Years of education: N/A     Occupational History    Not on file       Social History Main Topics    Smoking status: Never Smoker    Smokeless tobacco: Never Used    Alcohol use No    Drug use: No    Sexual activity: Not on file     Other Topics Concern    Not on file     Social History Narrative    No narrative on file       Allergies   Allergen Reactions    Meperidine     Morphine And Related      hallucinations  hallucinations    Neosporin [Neomycin-Bacitracin Zn-Polymyx]      rash  rash    Sudafed [Pseudoephedrine]          Current Outpatient Prescriptions:     ALPRAZolam (XANAX) 0 25 mg tablet, Take 0 25 mg by mouth, Disp: , Rfl:     aspirin (ECOTRIN LOW STRENGTH) 81 mg EC tablet, Take 81 mg by mouth daily, Disp: , Rfl:     clopidogrel (PLAVIX) 75 mg tablet, Take 1 tablet (75 mg total) by mouth daily for 90 days, Disp: 90 tablet, Rfl: 0    diphenhydrAMINE (BENADRYL) 25 mg tablet, Take 25 mg by mouth 2 (two) times a day, Disp: , Rfl:     ibuprofen (MOTRIN) 200 mg tablet, Take by mouth every 6 (six) hours as needed for mild pain, Disp: , Rfl:     lovastatin (MEVACOR) 40 MG tablet, , Disp: , Rfl:     quinapril (ACCUPRIL) 40 MG tablet, Take 40 mg by mouth 2 (two) times a day  , Disp: , Rfl:

## 2019-01-16 NOTE — PATIENT INSTRUCTIONS
Peripheral arterial disease status post angioplasty to left superficial femoral artery in November 2018 by Dr Liliam Gibson  -your left heel wound is nearly healed  -we reviewed the results of your recent arterial duplex  Results show a excellent improvement in blood flow to the left leg status post intervention  -continue Plavix for 90 days, last dose 3/4/2019  You should always remain on baby aspirin and lovastatin/cholesterol medication  -we will monitor you with a repeat lower extremity arterial duplex in 6 months    Based on the results of that ultrasound we will plan routine follow-up either once or twice per year, you will receive a letter in the mail with further instructions  -follow up in office in 1 year, notify the office sooner with any change in symptoms or development of leg pain when walking or nonhealing sores    The results of your left foot tissue sample read as hyperkeratotic skin"

## 2019-01-16 NOTE — LETTER
January 16, 2019     [address], [address] [facility] [id number]    Patient: Kee Rawls   YOB: 1945   Date of Visit: 1/16/2019       Dear Dr Nate Whitten: Thank you for referring Kee Rawls to me for evaluation  Below are my notes for this consultation  If you have questions, please do not hesitate to call me  I look forward to following your patient along with you           Sincerely,        SVETLANA Islas        CC: Nubia Venegas

## 2019-05-23 ENCOUNTER — APPOINTMENT (OUTPATIENT)
Dept: LAB | Facility: MEDICAL CENTER | Age: 74
End: 2019-05-23
Payer: COMMERCIAL

## 2019-05-23 ENCOUNTER — TRANSCRIBE ORDERS (OUTPATIENT)
Dept: ADMINISTRATIVE | Facility: HOSPITAL | Age: 74
End: 2019-05-23

## 2019-05-23 DIAGNOSIS — E03.9 HYPOTHYROIDISM, UNSPECIFIED TYPE: ICD-10-CM

## 2019-05-23 DIAGNOSIS — E11.9 TYPE 2 DIABETES MELLITUS WITHOUT COMPLICATION, WITHOUT LONG-TERM CURRENT USE OF INSULIN (HCC): ICD-10-CM

## 2019-05-23 DIAGNOSIS — E78.00 PURE HYPERCHOLESTEROLEMIA: Primary | ICD-10-CM

## 2019-05-23 DIAGNOSIS — Z79.01 LONG TERM (CURRENT) USE OF ANTICOAGULANTS: ICD-10-CM

## 2019-05-23 DIAGNOSIS — Z85.038 PERSONAL HISTORY OF COLON CANCER: ICD-10-CM

## 2019-05-23 DIAGNOSIS — Z12.39 BREAST SCREENING, UNSPECIFIED: ICD-10-CM

## 2019-05-23 LAB
ALBUMIN SERPL BCP-MCNC: 3.7 G/DL (ref 3.5–5)
ALP SERPL-CCNC: 89 U/L (ref 46–116)
ALT SERPL W P-5'-P-CCNC: 34 U/L (ref 12–78)
ANION GAP SERPL CALCULATED.3IONS-SCNC: 5 MMOL/L (ref 4–13)
AST SERPL W P-5'-P-CCNC: 18 U/L (ref 5–45)
BASOPHILS # BLD AUTO: 0.04 THOUSANDS/ΜL (ref 0–0.1)
BASOPHILS NFR BLD AUTO: 1 % (ref 0–1)
BILIRUB SERPL-MCNC: 0.57 MG/DL (ref 0.2–1)
BUN SERPL-MCNC: 14 MG/DL (ref 5–25)
CALCIUM SERPL-MCNC: 9.1 MG/DL (ref 8.3–10.1)
CHLORIDE SERPL-SCNC: 106 MMOL/L (ref 100–108)
CHOLEST SERPL-MCNC: 173 MG/DL (ref 50–200)
CO2 SERPL-SCNC: 27 MMOL/L (ref 21–32)
CREAT SERPL-MCNC: 1.01 MG/DL (ref 0.6–1.3)
CREAT UR-MCNC: 51.2 MG/DL
EOSINOPHIL # BLD AUTO: 0.19 THOUSAND/ΜL (ref 0–0.61)
EOSINOPHIL NFR BLD AUTO: 3 % (ref 0–6)
ERYTHROCYTE [DISTWIDTH] IN BLOOD BY AUTOMATED COUNT: 12.7 % (ref 11.6–15.1)
EST. AVERAGE GLUCOSE BLD GHB EST-MCNC: 237 MG/DL
GFR SERPL CREATININE-BSD FRML MDRD: 55 ML/MIN/1.73SQ M
GLUCOSE P FAST SERPL-MCNC: 234 MG/DL (ref 65–99)
HBA1C MFR BLD: 9.9 % (ref 4.2–6.3)
HCT VFR BLD AUTO: 45 % (ref 34.8–46.1)
HDLC SERPL-MCNC: 35 MG/DL (ref 40–60)
HGB BLD-MCNC: 14.5 G/DL (ref 11.5–15.4)
IMM GRANULOCYTES # BLD AUTO: 0.02 THOUSAND/UL (ref 0–0.2)
IMM GRANULOCYTES NFR BLD AUTO: 0 % (ref 0–2)
LDLC SERPL CALC-MCNC: 88 MG/DL (ref 0–100)
LYMPHOCYTES # BLD AUTO: 2.03 THOUSANDS/ΜL (ref 0.6–4.47)
LYMPHOCYTES NFR BLD AUTO: 27 % (ref 14–44)
MCH RBC QN AUTO: 30.7 PG (ref 26.8–34.3)
MCHC RBC AUTO-ENTMCNC: 32.2 G/DL (ref 31.4–37.4)
MCV RBC AUTO: 95 FL (ref 82–98)
MICROALBUMIN UR-MCNC: 37.3 MG/L (ref 0–20)
MICROALBUMIN/CREAT 24H UR: 73 MG/G CREATININE (ref 0–30)
MONOCYTES # BLD AUTO: 0.69 THOUSAND/ΜL (ref 0.17–1.22)
MONOCYTES NFR BLD AUTO: 9 % (ref 4–12)
NEUTROPHILS # BLD AUTO: 4.46 THOUSANDS/ΜL (ref 1.85–7.62)
NEUTS SEG NFR BLD AUTO: 60 % (ref 43–75)
NONHDLC SERPL-MCNC: 138 MG/DL
NRBC BLD AUTO-RTO: 0 /100 WBCS
PLATELET # BLD AUTO: 236 THOUSANDS/UL (ref 149–390)
PMV BLD AUTO: 12.2 FL (ref 8.9–12.7)
POTASSIUM SERPL-SCNC: 4.1 MMOL/L (ref 3.5–5.3)
PROT SERPL-MCNC: 7 G/DL (ref 6.4–8.2)
RBC # BLD AUTO: 4.72 MILLION/UL (ref 3.81–5.12)
SODIUM SERPL-SCNC: 138 MMOL/L (ref 136–145)
TRIGL SERPL-MCNC: 250 MG/DL
TSH SERPL DL<=0.05 MIU/L-ACNC: 2.02 UIU/ML (ref 0.36–3.74)
WBC # BLD AUTO: 7.43 THOUSAND/UL (ref 4.31–10.16)

## 2019-05-23 PROCEDURE — 85025 COMPLETE CBC W/AUTO DIFF WBC: CPT | Performed by: FAMILY MEDICINE

## 2019-05-23 PROCEDURE — 82043 UR ALBUMIN QUANTITATIVE: CPT | Performed by: FAMILY MEDICINE

## 2019-05-23 PROCEDURE — 80061 LIPID PANEL: CPT | Performed by: FAMILY MEDICINE

## 2019-05-23 PROCEDURE — 82570 ASSAY OF URINE CREATININE: CPT | Performed by: FAMILY MEDICINE

## 2019-05-23 PROCEDURE — 83036 HEMOGLOBIN GLYCOSYLATED A1C: CPT | Performed by: FAMILY MEDICINE

## 2019-05-23 PROCEDURE — 80053 COMPREHEN METABOLIC PANEL: CPT | Performed by: FAMILY MEDICINE

## 2019-05-23 PROCEDURE — 84443 ASSAY THYROID STIM HORMONE: CPT | Performed by: FAMILY MEDICINE

## 2019-05-23 PROCEDURE — 36415 COLL VENOUS BLD VENIPUNCTURE: CPT | Performed by: FAMILY MEDICINE

## 2019-07-16 ENCOUNTER — HOSPITAL ENCOUNTER (OUTPATIENT)
Dept: NON INVASIVE DIAGNOSTICS | Facility: CLINIC | Age: 74
Discharge: HOME | End: 2019-07-16
Payer: COMMERCIAL

## 2019-07-16 DIAGNOSIS — I70.299 ATHEROSCLEROSIS OF ARTERY OF EXTREMITY WITH ULCERATION (HCC): Chronic | ICD-10-CM

## 2019-07-16 DIAGNOSIS — L97.909 ATHEROSCLEROSIS OF ARTERY OF EXTREMITY WITH ULCERATION (HCC): Chronic | ICD-10-CM

## 2019-07-16 PROCEDURE — 93925 LOWER EXTREMITY STUDY: CPT | Performed by: SURGERY

## 2019-07-16 PROCEDURE — 93925 LOWER EXTREMITY STUDY: CPT

## 2019-07-16 PROCEDURE — 93922 UPR/L XTREMITY ART 2 LEVELS: CPT | Performed by: SURGERY

## 2019-07-16 PROCEDURE — 93923 UPR/LXTR ART STDY 3+ LVLS: CPT

## 2019-09-27 ENCOUNTER — TRANSCRIBE ORDERS (OUTPATIENT)
Dept: ADMINISTRATIVE | Facility: HOSPITAL | Age: 74
End: 2019-09-27

## 2019-09-27 ENCOUNTER — APPOINTMENT (OUTPATIENT)
Dept: LAB | Facility: MEDICAL CENTER | Age: 74
End: 2019-09-27
Payer: COMMERCIAL

## 2019-09-27 DIAGNOSIS — E78.00 PURE HYPERCHOLESTEROLEMIA: Primary | ICD-10-CM

## 2019-09-27 DIAGNOSIS — E11.9 DIABETES MELLITUS WITHOUT COMPLICATION (HCC): ICD-10-CM

## 2019-09-27 DIAGNOSIS — E78.00 PURE HYPERCHOLESTEROLEMIA: ICD-10-CM

## 2019-09-27 LAB
CREAT UR-MCNC: 150 MG/DL
EST. AVERAGE GLUCOSE BLD GHB EST-MCNC: 163 MG/DL
GLUCOSE P FAST SERPL-MCNC: 137 MG/DL (ref 65–99)
HBA1C MFR BLD: 7.3 % (ref 4.2–6.3)
MICROALBUMIN UR-MCNC: 28.8 MG/L (ref 0–20)
MICROALBUMIN/CREAT 24H UR: 19 MG/G CREATININE (ref 0–30)

## 2019-09-27 PROCEDURE — 82043 UR ALBUMIN QUANTITATIVE: CPT | Performed by: FAMILY MEDICINE

## 2019-09-27 PROCEDURE — 82570 ASSAY OF URINE CREATININE: CPT | Performed by: FAMILY MEDICINE

## 2019-09-27 PROCEDURE — 82947 ASSAY GLUCOSE BLOOD QUANT: CPT

## 2019-09-27 PROCEDURE — 36415 COLL VENOUS BLD VENIPUNCTURE: CPT | Performed by: FAMILY MEDICINE

## 2019-09-27 PROCEDURE — 83036 HEMOGLOBIN GLYCOSYLATED A1C: CPT | Performed by: FAMILY MEDICINE

## 2019-12-13 DIAGNOSIS — L97.909 ATHEROSCLEROSIS OF ARTERY OF EXTREMITY WITH ULCERATION (HCC): Primary | ICD-10-CM

## 2019-12-13 DIAGNOSIS — I70.299 ATHEROSCLEROSIS OF ARTERY OF EXTREMITY WITH ULCERATION (HCC): Primary | ICD-10-CM

## 2020-02-12 ENCOUNTER — HOSPITAL ENCOUNTER (OUTPATIENT)
Dept: NON INVASIVE DIAGNOSTICS | Facility: CLINIC | Age: 75
Discharge: HOME | End: 2020-02-12
Payer: COMMERCIAL

## 2020-02-12 DIAGNOSIS — L97.909 ATHEROSCLEROSIS OF ARTERY OF EXTREMITY WITH ULCERATION (HCC): ICD-10-CM

## 2020-02-12 DIAGNOSIS — I70.299 ATHEROSCLEROSIS OF ARTERY OF EXTREMITY WITH ULCERATION (HCC): ICD-10-CM

## 2020-02-12 PROCEDURE — 93923 UPR/LXTR ART STDY 3+ LVLS: CPT

## 2020-02-12 PROCEDURE — 93922 UPR/L XTREMITY ART 2 LEVELS: CPT | Performed by: SURGERY

## 2020-02-12 PROCEDURE — 93925 LOWER EXTREMITY STUDY: CPT

## 2020-02-12 PROCEDURE — 93925 LOWER EXTREMITY STUDY: CPT | Performed by: SURGERY

## 2020-02-18 ENCOUNTER — OFFICE VISIT (OUTPATIENT)
Dept: VASCULAR SURGERY | Facility: CLINIC | Age: 75
End: 2020-02-18
Payer: COMMERCIAL

## 2020-02-18 VITALS
HEART RATE: 97 BPM | DIASTOLIC BLOOD PRESSURE: 86 MMHG | BODY MASS INDEX: 29.35 KG/M2 | SYSTOLIC BLOOD PRESSURE: 144 MMHG | RESPIRATION RATE: 16 BRPM | HEIGHT: 70 IN | WEIGHT: 205 LBS

## 2020-02-18 DIAGNOSIS — I73.9 PAD (PERIPHERAL ARTERY DISEASE) (HCC): Primary | ICD-10-CM

## 2020-02-18 PROCEDURE — 99213 OFFICE O/P EST LOW 20 MIN: CPT | Performed by: NURSE PRACTITIONER

## 2020-02-18 RX ORDER — AMOXICILLIN 500 MG/1
CAPSULE ORAL
COMMUNITY
Start: 2020-02-03 | End: 2021-01-28 | Stop reason: ALTCHOICE

## 2020-02-18 RX ORDER — GLIMEPIRIDE 4 MG/1
4 TABLET ORAL
COMMUNITY
Start: 2019-12-27

## 2020-02-18 NOTE — PATIENT INSTRUCTIONS
Increase your walking  Continue current medications  Repeat Doppler in 6 months  Establish care with podiatry  Follow-up in the office in 1 year or sooner should he have worsening symptoms in the right lower extremity  Peripheral Artery Disease   WHAT YOU NEED TO KNOW:   What is peripheral artery disease? Peripheral artery disease (PAD) is narrow, weak, or blocked arteries  It may affect any arteries outside of your heart and brain  PAD is usually the result of a buildup of fat and cholesterol, also called plaque, along your artery walls  Inflammation, a blood clot, or abnormal cell growth could also block your arteries  PAD prevents normal blood flow to your legs and arms  You are at risk of an amputation if poor blood flow keeps wounds from healing or causes gangrene (tissue death)  Without treatment, PAD can also cause a heart attack or stroke  What increases my risk for PAD? · Smoking cigarettes     · Diabetes     · High blood pressure     · High cholesterol     · Age older than 40 years    · Heart disease or a family history of heart disease  What are the signs and symptoms of PAD? Mild PAD usually does not cause symptoms  As the disease worsens over time, you may have the following:  · Pain or cramps in your leg or hip while you walk     · A numb, weak, or heavy feeling in your legs     · Dry, scaly, red, or pale skin on your legs     · Thick or brittle nails, or hair loss on your arms and legs     · Foot sores that will not heal     · Burning or aching in your feet and toes while resting (this may be worse when you lie down)  How is PAD diagnosed? · Angiography  is a test that shows pictures of the arteries in your arms and legs  You will be given contrast liquid to help the arteries show up better on the pictures  The pictures will be taken with an MRI or CT scan  Tell the healthcare provider if you have ever had an allergic reaction to contrast liquid   Do not enter the MRI room with anything metal  Metal can cause serious injury  Tell a healthcare provider if you have any metal in or on your body  · Doppler ankle brachial index (JOANA)  is a test that compares blood pressure in your ankles to blood pressure in your arms  This tells your healthcare provider how well blood is flowing through the arteries in your legs  How is PAD treated? Treatment can help reduce your risk of a heart attack, stroke, or amputation  You may need more than one of the following:  · Medicines  may be given  Your healthcare provider may give you any of the following:     ¨ Antiplatelet medicine,  such as aspirin, helps prevent blood clots and reduces the risk of a heart attack or stroke  ¨ Statin medicine  helps lower your cholesterol and prevents your PAD from getting worse  · A supervised exercise program  helps you stay active in normal daily activities and may prevent disability  Healthcare providers will help you safely walk or do strength training exercises 3 times a week for 30 to 60 minutes  You will do this for several months, then transition to walking on your own  · Angioplasty  is a procedure to open your artery so blood can flow through normally  A thin tube called a catheter is used to insert a small balloon into your artery  The balloon is inflated to open your blocked artery, and then removed  A tube called a stent may be placed in your artery to hold it open  · Bypass surgery  is used to make a new connection to your artery with a vein from another part of your body, or an artificial graft  The vein or graft is attached to your artery above and below your blockage  This allows blood to flow around the blocked portion of your artery  How can I manage PAD? · Do not smoke  Nicotine and other chemicals in cigarettes and cigars can worsen PAD  They can also increase your risk for a heart attack or stroke  Ask your healthcare provider for information if you currently smoke and need help to quit  E-cigarettes or smokeless tobacco still contain nicotine  Talk to your healthcare provider before you use these products  · Manage other health conditions  Take your medicines as directed  Follow your healthcare provider's instructions if you have high blood pressure or high cholesterol  Perform foot care and check your blood sugar levels as directed if you have diabetes  · Eat heart healthy foods  Eat whole grains, fruits, and vegetables every day  Limit salt and high-fat foods  Ask your healthcare provider for more information on a heart healthy diet  Ask if you need to lose weight  Your healthcare provider can help you create a healthy weight-loss plan  Call 911 for the following:   · You have any of the following signs of a heart attack:      ¨ Squeezing, pressure, or pain in your chest that lasts longer than 5 minutes or returns    ¨ Discomfort or pain in your back, neck, jaw, stomach, or arm     ¨ Trouble breathing    ¨ Nausea or vomiting    ¨ Lightheadedness or a sudden cold sweat, especially with chest pain or trouble breathing    · You have any of the following signs of a stroke:      ¨ Numbness or drooping on one side of your face     ¨ Weakness in an arm or leg    ¨ Confusion or difficulty speaking    ¨ Dizziness, a severe headache, or vision loss  When should I seek immediate care? · You have sores or wounds that will not heal      · You notice black or discolored skin on your arm or leg  · Your skin is cool to the touch  When should I contact my healthcare provider? · You have leg pain when you walk 1/8 mile (200 meters) or less, even with treatment  · Your legs are red, dry, or pale, even with treatment  · You have questions or concerns about your condition or care  CARE AGREEMENT:   You have the right to help plan your care  Learn about your health condition and how it may be treated  Discuss treatment options with your caregivers to decide what care you want to receive  You always have the right to refuse treatment  The above information is an  only  It is not intended as medical advice for individual conditions or treatments  Talk to your doctor, nurse or pharmacist before following any medical regimen to see if it is safe and effective for you  © 2017 [employer] Information is for End User's use only and may not be sold, redistributed or otherwise used for commercial purposes  All illustrations and images included in CareNotes® are the copyrighted property of [employer]  or Jamin Anderson

## 2020-02-18 NOTE — PROGRESS NOTES
Assessment/Plan:  70-year-old female with HTN, HLD, DM, PAD s/p L SFA angioplasty '18 ([facility]) who returns to the office for yearly visit to review LEAD 2/12/20  LEAD - R SFA >75% stenosis followed by short segment occlusion, R JOANA 0 56/100/76 and L diffuse disease without focal stenosis, L JOANA 1 17/108/21  Intermittent R calf claudication symptoms  Not walking much  No ischemic rest pain or wounds  -Reviewed study in detail with patient and    -Recommended increasing activity   -Continue ASA and lovastatin   -Repeat duplex in 6 months   -Follow up in the office in 1 year   -Referred to podiatry for foot care  -Notify the office if any increased RLE pain or discomfort or wound        Problem List Items Addressed This Visit        Cardiovascular and Mediastinum    PAD (peripheral artery disease) ([facility] [address] ) - Primary    Relevant Orders    VAS lower limb arterial duplex, complete bilateral    Ambulatory referral to Podiatry            Subjective:      Patient ID: Alba Jackson is a 66 y o  female  Pt is here VERN on 02/12/20  Pt denies any numbness, tingling, or  coldness   Pt denies any open wounds  Pt denies Claudication or rest pain  Pt taking ASA 81 mg  and Lovastatin  HPI  70-year-old female with HTN, HLD, PAD s/p L SFA angioplasty '18 ([facility]) who returns to the office for yearly visit to review LEAD 2/12/20  LEAD showed - R SFA >75% stenosis followed by short segment occlusion, R JOANA 0 56/100/76 and L diffuse disease without focal stenosis, L JOANA 1 17/108/21  She denies any claudication symptoms  No ischemic rest pain or tissue loss  She asked for referral to podiatry  The following portions of the patient's history were reviewed and updated as appropriate: allergies, current medications, past family history, past medical history, past social history, past surgical history and problem list   Review of systems reviewed    Review of Systems   Constitutional: Negative      HENT: Positive for postnasal drip, sinus pressure, sore throat and voice change (hoarseness)  Eyes: Positive for redness and itching  Respiratory: Positive for cough  Cardiovascular: Negative  Gastrointestinal: Negative  Endocrine: Negative  Genitourinary: Negative  Musculoskeletal: Negative  Skin: Negative  Allergic/Immunologic: Negative  Neurological: Positive for speech difficulty  Hematological: Negative  Psychiatric/Behavioral: The patient is nervous/anxious  Objective:  I have reviewed and made appropriate changes to the review of systems input by the medical assistant      Vitals:    02/18/20 1435   BP: 144/86   BP Location: Right arm   Patient Position: Sitting   Pulse: 97   Resp: 16   Weight: 93 kg (205 lb)   Height: 5' 10" (1 778 m)       Patient Active Problem List   Diagnosis    Toe cyanosis    Atherosclerosis of artery of extremity with ulceration (HCC)    PAD (peripheral artery disease) (HCC)       Past Surgical History:   Procedure Laterality Date    HYSTERECTOMY      IR ABDOMINAL ANGIOGRAPHY / INTERVENTION  11/15/2018    MASTECTOMY      REPLACEMENT TOTAL KNEE Left        Family History   Problem Relation Age of Onset    Diabetes Mother     Hypertension Mother     Atrial fibrillation Father        Social History     Socioeconomic History    Marital status: /Civil Union     Spouse name: Not on file    Number of children: Not on file    Years of education: Not on file    Highest education level: Not on file   Occupational History    Not on file   Social Needs    Financial resource strain: Not on file    Food insecurity:     Worry: Not on file     Inability: Not on file    Transportation needs:     Medical: Not on file     Non-medical: Not on file   Tobacco Use    Smoking status: Never Smoker    Smokeless tobacco: Never Used   Substance and Sexual Activity    Alcohol use: No    Drug use: No    Sexual activity: Not on file   Lifestyle    Physical activity:     Days per week: Not on file     Minutes per session: Not on file    Stress: Not on file   Relationships    Social connections:     Talks on phone: Not on file     Gets together: Not on file     Attends Judaism service: Not on file     Active member of club or organization: Not on file     Attends meetings of clubs or organizations: Not on file     Relationship status: Not on file    Intimate partner violence:     Fear of current or ex partner: Not on file     Emotionally abused: Not on file     Physically abused: Not on file     Forced sexual activity: Not on file   Other Topics Concern    Not on file   Social History Narrative    Not on file       Allergies   Allergen Reactions    Meperidine     Morphine And Related      hallucinations  hallucinations    Neosporin [Neomycin-Bacitracin Zn-Polymyx]      rash  rash    Sudafed [Pseudoephedrine]          Current Outpatient Medications:     aspirin (ECOTRIN LOW STRENGTH) 81 mg EC tablet, Take 81 mg by mouth daily, Disp: , Rfl:     diphenhydrAMINE (BENADRYL) 25 mg tablet, Take 25 mg by mouth 2 (two) times a day, Disp: , Rfl:     ibuprofen (MOTRIN) 200 mg tablet, Take by mouth every 6 (six) hours as needed for mild pain, Disp: , Rfl:     lovastatin (MEVACOR) 40 MG tablet, , Disp: , Rfl:     quinapril (ACCUPRIL) 40 MG tablet, Take 40 mg by mouth 2 (two) times a day  , Disp: , Rfl:     ALPRAZolam (XANAX) 0 25 mg tablet, Take 0 25 mg by mouth, Disp: , Rfl:     amoxicillin (AMOXIL) 500 mg capsule, , Disp: , Rfl:     glimepiride (AMARYL) 4 mg tablet, , Disp: , Rfl:       /86 (BP Location: Right arm, Patient Position: Sitting)   Pulse 97   Resp 16   Ht 5' 10" (1 778 m)   Wt 93 kg (205 lb)   BMI 29 41 kg/m²          Physical Exam   Constitutional: She is oriented to person, place, and time  She appears well-developed and well-nourished  HENT:   Head: Normocephalic and atraumatic  Eyes: EOM are normal    Neck: Neck supple     Cardiovascular: Normal heart sounds  Pulses:       Femoral pulses are 2+ on the right side, and 2+ on the left side  Dorsalis pedis pulses are 0 on the right side, and 2+ on the left side  Posterior tibial pulses are 0 on the right side, and 2+ on the left side  Pulmonary/Chest: Effort normal and breath sounds normal    Abdominal: Soft  Bowel sounds are normal    Musculoskeletal: Normal range of motion  She exhibits no edema  Neurological: She is alert and oriented to person, place, and time  Skin: Skin is warm  Psychiatric: Her behavior is normal  Thought content normal    Nursing note and vitals reviewed

## 2020-03-11 ENCOUNTER — APPOINTMENT (OUTPATIENT)
Dept: RADIOLOGY | Facility: MEDICAL CENTER | Age: 75
End: 2020-03-11
Payer: COMMERCIAL

## 2020-03-11 ENCOUNTER — TRANSCRIBE ORDERS (OUTPATIENT)
Dept: ADMINISTRATIVE | Facility: HOSPITAL | Age: 75
End: 2020-03-11

## 2020-03-11 DIAGNOSIS — R05.9 COUGH: Primary | ICD-10-CM

## 2020-03-11 DIAGNOSIS — R05.9 COUGH: ICD-10-CM

## 2020-03-11 PROCEDURE — 71046 X-RAY EXAM CHEST 2 VIEWS: CPT

## 2020-08-18 ENCOUNTER — HOSPITAL ENCOUNTER (OUTPATIENT)
Dept: NON INVASIVE DIAGNOSTICS | Facility: CLINIC | Age: 75
Discharge: HOME | End: 2020-08-18
Payer: COMMERCIAL

## 2020-08-18 DIAGNOSIS — I73.9 PAD (PERIPHERAL ARTERY DISEASE) (HCC): ICD-10-CM

## 2020-08-18 PROCEDURE — 93925 LOWER EXTREMITY STUDY: CPT

## 2020-08-18 PROCEDURE — 93922 UPR/L XTREMITY ART 2 LEVELS: CPT | Performed by: SURGERY

## 2020-08-18 PROCEDURE — 93923 UPR/LXTR ART STDY 3+ LVLS: CPT

## 2020-08-18 PROCEDURE — 93925 LOWER EXTREMITY STUDY: CPT | Performed by: SURGERY

## 2020-08-19 ENCOUNTER — TELEPHONE (OUTPATIENT)
Dept: VASCULAR SURGERY | Facility: CLINIC | Age: 75
End: 2020-08-19

## 2020-08-19 NOTE — TELEPHONE ENCOUNTER
----- Message from Sunday Willy sent at 8/19/2020 10:08 AM EDT -----  Call patient for OV with PA-C/CRNP to review study and severity of symptoms

## 2020-08-27 ENCOUNTER — OFFICE VISIT (OUTPATIENT)
Dept: PODIATRY | Facility: CLINIC | Age: 75
End: 2020-08-27
Payer: COMMERCIAL

## 2020-08-27 VITALS
HEART RATE: 99 BPM | BODY MASS INDEX: 29.41 KG/M2 | DIASTOLIC BLOOD PRESSURE: 100 MMHG | SYSTOLIC BLOOD PRESSURE: 200 MMHG | TEMPERATURE: 97.7 F | HEIGHT: 70 IN

## 2020-08-27 DIAGNOSIS — B35.1 ONYCHOMYCOSIS: Primary | ICD-10-CM

## 2020-08-27 DIAGNOSIS — I73.9 PAD (PERIPHERAL ARTERY DISEASE) (HCC): ICD-10-CM

## 2020-08-27 PROCEDURE — 11721 DEBRIDE NAIL 6 OR MORE: CPT | Performed by: PODIATRIST

## 2020-08-27 PROCEDURE — 99203 OFFICE O/P NEW LOW 30 MIN: CPT | Performed by: PODIATRIST

## 2020-08-28 ENCOUNTER — TRANSCRIBE ORDERS (OUTPATIENT)
Dept: ADMINISTRATIVE | Facility: HOSPITAL | Age: 75
End: 2020-08-28

## 2020-08-28 ENCOUNTER — APPOINTMENT (OUTPATIENT)
Dept: LAB | Facility: MEDICAL CENTER | Age: 75
End: 2020-08-28
Payer: COMMERCIAL

## 2020-08-28 DIAGNOSIS — R05.9 COUGH: ICD-10-CM

## 2020-08-28 DIAGNOSIS — R04.2 COUGHING UP BLOOD: ICD-10-CM

## 2020-08-28 DIAGNOSIS — R05.9 COUGH: Primary | ICD-10-CM

## 2020-08-28 LAB
BUN SERPL-MCNC: 15 MG/DL (ref 5–25)
CREAT SERPL-MCNC: 1.29 MG/DL (ref 0.6–1.3)
GFR SERPL CREATININE-BSD FRML MDRD: 40 ML/MIN/1.73SQ M

## 2020-08-28 PROCEDURE — 82565 ASSAY OF CREATININE: CPT | Performed by: OTOLARYNGOLOGY

## 2020-08-28 PROCEDURE — 36415 COLL VENOUS BLD VENIPUNCTURE: CPT

## 2020-08-28 PROCEDURE — 84520 ASSAY OF UREA NITROGEN: CPT

## 2020-09-10 PROBLEM — B35.1 ONYCHOMYCOSIS: Status: ACTIVE | Noted: 2020-09-10

## 2020-09-10 NOTE — PATIENT INSTRUCTIONS
Foot Care for People with Diabetes   WHAT YOU NEED TO KNOW:   · Foot care helps protect your feet and prevent foot ulcers or sores  Long-term high blood sugar levels can damage the blood vessels and nerves in your legs and feet  This damage makes it hard to feel pressure, pain, temperature, and touch  You may not be able to feel a cut or sore, or shoes that are too tight  Foot care is needed to prevent serious problems, such as an infection or amputation  · Diabetes may cause your toes to become crooked or curved under  These changes may affect the way you walk and can lead to increased pressure on your foot  The pressure can decrease blood flow to your feet  Lack of blood flow increases your risk for a foot ulcer  Do not ignore small problems, such as dry skin or small wounds  These can become life-threatening over time without proper care  DISCHARGE INSTRUCTIONS:   Contact your healthcare provider if:   · Your feet become numb, weak, or hard to move  · You have pus draining from a sore on your foot  · You have a wound on your foot that gets bigger, deeper, or does not heal      · You see blisters, cuts, scratches, calluses, or sores on your foot  · You have a fever, and your feet become red, warm, and swollen  · Your toenails become thick, curled, or yellow  · You find it hard to check your feet because your vision is poor  · You have questions or concerns about your condition or care  Foot care:   · Check your feet each day  Look at your whole foot, including the bottom, and between and under your toes  Check for wounds, corns, and calluses  Use a mirror to see the bottom of your feet  The skin on your feet may be shiny, tight, or darker than normal  Your feet may also be cold and pale  Feel your feet by running your hands along the tops, bottoms, sides, and between your toes  Redness, swelling, and warmth are signs of blood flow problems that can lead to a foot ulcer   Do not try to remove corns or calluses yourself  · Wash your feet each day with soap and warm water  Do not use hot water, because this can injure your foot  Dry your feet gently with a towel after you wash them  Dry between and under your toes  · Apply lotion or a moisturizer on your dry feet  Ask your healthcare provider what lotions are best to use  Do not put lotion or moisturizer between your toes  · Cut your toenails correctly  File or cut your toenails straight across  Use a soft brush to clean around your toenails  If your toenails are very thick, you may need to have a healthcare provider or specialist cut them  · Protect your feet  Do not walk barefoot or wear your shoes without socks  Check your shoes for rocks or other objects that can hurt your feet  Wear cotton socks to help keep your feet dry  Wear socks without toe seams, or wear them with the seams inside out  Change your socks each day  Do not wear socks that are dirty or damp  · Wear shoes that fit well  Wear shoes that do not rub against any area of your feet  Your shoes should be ½ to ¾ inch (1 to 2 centimeters) longer than your feet  Your shoes should also have extra space around the widest part of your feet  Walking or athletic shoes with laces or straps that adjust are best  Ask your healthcare provider for help to choose shoes that fit you best  Ask him if you need to wear an insert, orthotic, or bandage on your feet  Follow up with your healthcare provider or foot specialist as directed: You will need to have your feet checked at least once a year  You may need a foot exam more often if you have nerve damage, foot deformities, or ulcers  Write down your questions so you remember to ask them during your visits  © 2017 [address] [employer] Information is for End User's use only and may not be sold, redistributed or otherwise used for commercial purposes   All illustrations and images included in CareNotes® are the copyrighted property of [employer]  or [employer] Anderson  The above information is an  only  It is not intended as medical advice for individual conditions or treatments  Talk to your doctor, nurse or pharmacist before following any medical regimen to see if it is safe and effective for you

## 2020-09-10 NOTE — PROGRESS NOTES
This patient was seen on 8/27/20  Assessment:    Problem List Items Addressed This Visit        Cardiovascular and Mediastinum    PAD (peripheral artery disease) ([facility] )       Musculoskeletal and Integument    Onychomycosis - Primary          Plan:  High risk  foot precautions reviewed with patient including the need to wear protective shoegear at all times when walking including in the home, the need to check feet all surfaces daily with a mirror and report and skin breaks to podiatry, the need to apply an emollient to skin of feet daily  Nails x 10 were debrided with double-action nail forceps of their thickness, length and width  Diagnoses and all orders for this visit:    Onychomycosis    PAD (peripheral artery disease) ([facility] )  -     Ambulatory referral to Podiatry          Subjective:      Patient ID: Diaz Rios is a 66 y o  female  Denis Prey is here for the first time referred over for high risk foot care by Britta Newell   She has a longstanding history of PAD  [facility]  Pt denies any numbness, tingling, or  coldness   Pt denies any open wounds  Pt denies Claudication or rest pain  Pt taking ASA 81 mg  and Lovastatin  She is 18-year-old female with HTN, HLD, PAD s/p L SFA angioplasty '18 ([facility]) who returns to the office for yearly visit to review LEAD 2/12/20  LEAD showed - R SFA >75% stenosis followed by short segment occlusion, R JOANA 0 56/100/76 and L diffuse disease without focal stenosis, L JOANA 1 17/108/21                      The following portions of the patient's history were reviewed and updated as appropriate: allergies, current medications, past family history, past medical history, past social history, past surgical history and problem list     Review of Systems   Constitutional: Negative  Respiratory: Negative  Cardiovascular: Negative  Gastrointestinal: Negative  Musculoskeletal: Negative  Hematological: Negative  Psychiatric/Behavioral: Negative  Objective:      BP (!) 200/100   Pulse 99   Temp 97 7 °F (36 5 °C)   Ht 5' 10" (1 778 m) Comment: verbal  BMI 29 41 kg/m²          Physical Exam  Vitals signs and nursing note reviewed  Constitutional:       General: She is not in acute distress  Appearance: Normal appearance  She is not ill-appearing, toxic-appearing or diaphoretic  HENT:      Head: Normocephalic  Cardiovascular:      Rate and Rhythm: Normal rate  Pulses:           Dorsalis pedis pulses are 0 on the right side and 1+ on the left side  Posterior tibial pulses are 0 on the right side and 1+ on the left side  Pulmonary:      Effort: Pulmonary effort is normal    Abdominal:      General: Bowel sounds are normal    Musculoskeletal:        Feet:    Feet:      Right foot:      Protective Sensation: 10 sites tested  10 sites sensed  Left foot:      Protective Sensation: 10 sites tested  10 sites sensed  Neurological:      General: No focal deficit present  Mental Status: She is alert     Psychiatric:         Mood and Affect: Mood normal

## 2020-09-11 ENCOUNTER — TELEPHONE (OUTPATIENT)
Dept: VASCULAR SURGERY | Facility: CLINIC | Age: 75
End: 2020-09-11

## 2020-09-14 ENCOUNTER — OFFICE VISIT (OUTPATIENT)
Dept: VASCULAR SURGERY | Facility: CLINIC | Age: 75
End: 2020-09-14
Payer: COMMERCIAL

## 2020-09-14 VITALS
SYSTOLIC BLOOD PRESSURE: 198 MMHG | HEART RATE: 86 BPM | TEMPERATURE: 98.1 F | BODY MASS INDEX: 30.51 KG/M2 | HEIGHT: 69 IN | DIASTOLIC BLOOD PRESSURE: 96 MMHG | WEIGHT: 206 LBS

## 2020-09-14 DIAGNOSIS — I73.9 PAD (PERIPHERAL ARTERY DISEASE) (HCC): Primary | ICD-10-CM

## 2020-09-14 PROBLEM — I70.299 ATHEROSCLEROSIS OF ARTERY OF EXTREMITY WITH ULCERATION (HCC): Chronic | Status: RESOLVED | Noted: 2018-10-03 | Resolved: 2020-09-14

## 2020-09-14 PROBLEM — L97.909 ATHEROSCLEROSIS OF ARTERY OF EXTREMITY WITH ULCERATION (HCC): Chronic | Status: RESOLVED | Noted: 2018-10-03 | Resolved: 2020-09-14

## 2020-09-14 PROBLEM — I10 ESSENTIAL HYPERTENSION: Status: ACTIVE | Noted: 2020-09-14

## 2020-09-14 PROCEDURE — 99213 OFFICE O/P EST LOW 20 MIN: CPT | Performed by: NURSE PRACTITIONER

## 2020-09-14 RX ORDER — LOSARTAN POTASSIUM 50 MG/1
50 TABLET ORAL DAILY
COMMUNITY
End: 2021-01-28 | Stop reason: ALTCHOICE

## 2020-09-14 NOTE — ASSESSMENT & PLAN NOTE
78-year-old female with DM, hypertension,hoarseness, severe allergies, peripheral arterial occlusive disease with history of left SFA drug coated balloon angioplasty secondary to tissue loss by Dr Dunia Gaitan 11/15/18 who returns to the office to review LEAD 8/18/20  -LEAD showed R prox to mid SFA >75% stenosis, short segment mid SFA occlusion R JOANA 0 54/101/110 and left diffuse disease without focal stenosis left JOANA 1 09/211/153  -She has short distance right calf claudication symptoms  No ischemic rest pain or tissue loss  -Following with podiatry for proper foot care  -We discussed intervention versus medical management with walking routine  -At this point she has multiple issues with blood pressure, coughing and hoarseness that she does not wish to pursue intervention  -Counseled on a walking routine   -Repeat duplex in 6 months  -Continue aspirin  -Continue lovastatin  -Follow up in the office in 6 months or sooner should she develop a wound that is nonhealing or worsening symptoms with walking

## 2020-09-14 NOTE — ASSESSMENT & PLAN NOTE
-/96 today in office   -Ongoing issue, regimen adjusted by cardiology recently   -She needs to brachial BP cuff   -Some element of white coat HTN  -Check BP daily   -Follow up with Dr Giovanna Mcclure

## 2020-09-14 NOTE — PROGRESS NOTES
Assessment/Plan:    PAD (peripheral artery disease) ([facility] )  75-year-old female with DM, hypertension,hoarseness, severe allergies, peripheral arterial occlusive disease with history of left SFA drug coated balloon angioplasty secondary to tissue loss by Dr Yancy Nina 11/15/18 who returns to the office to review LEAD 8/18/20  -LEAD showed R prox to mid SFA >75% stenosis, short segment mid SFA occlusion R JOANA 0 54/101/110 and left diffuse disease without focal stenosis left JOANA 1 09/211/153  -She has short distance right calf claudication symptoms  No ischemic rest pain or tissue loss  -Following with podiatry for proper foot care  -We discussed intervention versus medical management with walking routine  -At this point she has multiple issues with blood pressure, coughing and hoarseness that she does not wish to pursue intervention  -Counseled on a walking routine   -Repeat duplex in 6 months  -Continue aspirin  -Continue lovastatin  -Follow up in the office in 6 months or sooner should she develop a wound that is nonhealing or worsening symptoms with walking  Essential hypertension  -/96 today in office   -Ongoing issue, regimen adjusted by cardiology recently   -She needs to brachial BP cuff   -Some element of white coat HTN  -Check BP daily   -Follow up with Dr Pinky Schmidt        Diagnoses and all orders for this visit:    PAD (peripheral artery disease) ([facility])  -     VAS lower limb arterial duplex, complete bilateral; Future    Other orders  -     losartan (COZAAR) 50 mg tablet; Take 50 mg by mouth daily          Subjective:      Patient ID: Vern Faust is a 66 y o  female  Patient had VERN completed on 8/18/20  Patient complains of pain to legs when walking  Can walk about 100 yards before resting  Patient denies numbness, tingling, edema, discoloration   Patient is taking ASA and Lovastatin    HPI   Patient  presents to the office to review arterial duplex 8/18/20  Patient has short distance right calf claudication symptoms that relieved with rest   She does not walk much  She denies ischemic rest pain is not experiencing any tissue loss  At last visit she was referred to podiatry which she is following for nail trimmings  She had ongoing issue with her blood pressure and a nonproductive cough  She was on  Quinapril and was discontinued  Now on Losartan  She is following with ENT, speech  Therapy was delayed due to COVID  She established with a new cardiologist and undergoing testing  She needs to get a brachial blood pressure cuff  Her  said that her blood pressure is usually high with office visit  Blood pressure 198/96  Patient reports past 3 weeks blood pressure has been elevated  Medications are being adjusted       The following portions of the patient's history were reviewed and updated as appropriate: allergies, current medications, past family history, past medical history, past social history, past surgical history and problem list   ROS reviewed     Review of Systems   HENT: Positive for postnasal drip and voice change (hoarse)  Eyes: Negative  Respiratory: Positive for cough (Dry, nonproduction)  Cardiovascular: Negative  Gastrointestinal: Positive for nausea  Endocrine: Negative  Genitourinary: Negative  Musculoskeletal:        Bilateral leg pain   Skin: Negative  Allergic/Immunologic: Negative  Neurological: Negative  Hematological: Negative  Psychiatric/Behavioral: Negative  Objective:  I have reviewed and made appropriate changes to the review of systems input by the medical assistant      Vitals:    09/14/20 1313   BP: (!) 198/96   BP Location: Right arm   Patient Position: Sitting   Pulse: 86   Temp: 98 1 °F (36 7 °C)   TempSrc: Tympanic   Weight: 93 4 kg (206 lb)   Height: 5' 9" (1 753 m)       Patient Active Problem List   Diagnosis    Toe cyanosis    [facility])    Onychomycosis    Essential hypertension Past Surgical History:   Procedure Laterality Date    HYSTERECTOMY      IR AORTAGRAM WITH RUN-OFF  11/15/2018    MASTECTOMY      REPLACEMENT TOTAL KNEE Left        Family History   Problem Relation Age of Onset    Diabetes Mother     Hypertension Mother     Atrial fibrillation Father        Social History     Socioeconomic History    Marital status: /Civil Union     Spouse name: Not on file    Number of children: Not on file    Years of education: Not on file    Highest education level: Not on file   Occupational History    Not on file   Social Needs    Financial resource strain: Not on file    Food insecurity     Worry: Not on file     Inability: Not on file   [employer] needs     Medical: Not on file     Non-medical: Not on file   Tobacco Use    Smoking status: Never Smoker    Smokeless tobacco: Never Used   Substance and Sexual Activity    Alcohol use: No    Drug use: No    Sexual activity: Not on file   Lifestyle    Physical activity     Days per week: Not on file     Minutes per session: Not on file    Stress: Not on file   Relationships    Social connections     Talks on phone: Not on file     Gets together: Not on file     Attends Evangelical service: Not on file     Active member of club or organization: Not on file     Attends meetings of clubs or organizations: Not on file     Relationship status: Not on file    Intimate partner violence     Fear of current or ex partner: Not on file     Emotionally abused: Not on file     Physically abused: Not on file     Forced sexual activity: Not on file   Other Topics Concern    Not on file   Social History Narrative    Not on file       Allergies   Allergen Reactions    Meperidine     Morphine And Related      hallucinations  hallucinations    Neosporin [Neomycin-Bacitracin Zn-Polymyx]      rash  rash    Sudafed [Pseudoephedrine]          Current Outpatient Medications:     aspirin (ECOTRIN LOW STRENGTH) 81 mg EC tablet, Take 81 mg by mouth daily, Disp: , Rfl:     diphenhydrAMINE (BENADRYL) 25 mg tablet, Take 25 mg by mouth 2 (two) times a day, Disp: , Rfl:     glimepiride (AMARYL) 4 mg tablet, Take 4 mg by mouth daily with breakfast , Disp: , Rfl:     losartan (COZAAR) 50 mg tablet, Take 50 mg by mouth daily, Disp: , Rfl:     lovastatin (MEVACOR) 40 MG tablet, , Disp: , Rfl:     ALPRAZolam (XANAX) 0 25 mg tablet, Take 0 25 mg by mouth, Disp: , Rfl:     amoxicillin (AMOXIL) 500 mg capsule, , Disp: , Rfl:     ibuprofen (MOTRIN) 200 mg tablet, Take by mouth every 6 (six) hours as needed for mild pain, Disp: , Rfl:     quinapril (ACCUPRIL) 40 MG tablet, Take 40 mg by mouth 2 (two) times a day  , Disp: , Rfl:       BP (!) 198/96 (BP Location: Right arm, Patient Position: Sitting)   Pulse 86   Temp 98 1 °F (36 7 °C) (Tympanic)   Ht 5' 9" (1 753 m)   Wt 93 4 kg (206 lb)   BMI 30 42 kg/m²          Physical Exam  Vitals signs and nursing note reviewed  Exam conducted with a chaperone present  Constitutional:       Appearance: Normal appearance  HENT:      Head: Normocephalic and atraumatic  Mouth/Throat:      Comments: Hoarseness  Cardiovascular:      Rate and Rhythm: Normal rate  Pulses:           Carotid pulses are 2+ on the left side  Dorsalis pedis pulses are 0 on the right side  Posterior tibial pulses are 0 on the right side and 2+ on the left side  Pulmonary:      Effort: Pulmonary effort is normal       Comments: + non productive cough  Abdominal:      General: Abdomen is flat  Palpations: Abdomen is soft  Musculoskeletal: Normal range of motion  Skin:     General: Skin is warm  Neurological:      General: No focal deficit present  Mental Status: She is alert and oriented to person, place, and time     Psychiatric:         Mood and Affect: Mood normal

## 2020-09-14 NOTE — PATIENT INSTRUCTIONS
Peripheral Artery Disease   WHAT YOU NEED TO KNOW:   What is peripheral artery disease? Peripheral artery disease (PAD) is narrow, weak, or blocked arteries  It may affect any arteries outside of your heart and brain  PAD is usually the result of a buildup of fat and cholesterol, also called plaque, along your artery walls  Inflammation, a blood clot, or abnormal cell growth could also block your arteries  PAD prevents normal blood flow to your legs and arms  You are at risk of an amputation if poor blood flow keeps wounds from healing or causes gangrene (tissue death)  Without treatment, PAD can also cause a heart attack or stroke  What increases my risk for PAD? · Smoking cigarettes     · Diabetes     · High blood pressure     · High cholesterol     · Age older than 40 years    · Heart disease or a family history of heart disease  What are the signs and symptoms of PAD? Mild PAD usually does not cause symptoms  As the disease worsens over time, you may have the following:  · Pain or cramps in your leg or hip while you walk     · A numb, weak, or heavy feeling in your legs     · Dry, scaly, red, or pale skin on your legs     · Thick or brittle nails, or hair loss on your arms and legs     · Foot sores that will not heal     · Burning or aching in your feet and toes while resting (this may be worse when you lie down)  How is PAD diagnosed? · Angiography  is a test that shows pictures of the arteries in your arms and legs  You will be given contrast liquid to help the arteries show up better on the pictures  The pictures will be taken with an MRI or CT scan  Tell the healthcare provider if you have ever had an allergic reaction to contrast liquid  Do not enter the MRI room with anything metal  Metal can cause serious injury  Tell a healthcare provider if you have any metal in or on your body      · Doppler ankle brachial index (JOANA)  is a test that compares blood pressure in your ankles to blood pressure in your arms  This tells your healthcare provider how well blood is flowing through the arteries in your legs  How is PAD treated? Treatment can help reduce your risk of a heart attack, stroke, or amputation  You may need more than one of the following:  · Medicines  may be given  Your healthcare provider may give you any of the following:     ¨ Antiplatelet medicine,  such as aspirin, helps prevent blood clots and reduces the risk of a heart attack or stroke  ¨ Statin medicine  helps lower your cholesterol and prevents your PAD from getting worse  · A supervised exercise program  helps you stay active in normal daily activities and may prevent disability  Healthcare providers will help you safely walk or do strength training exercises 3 times a week for 30 to 60 minutes  You will do this for several months, then transition to walking on your own  · Angioplasty  is a procedure to open your artery so blood can flow through normally  A thin tube called a catheter is used to insert a small balloon into your artery  The balloon is inflated to open your blocked artery, and then removed  A tube called a stent may be placed in your artery to hold it open  · Bypass surgery  is used to make a new connection to your artery with a vein from another part of your body, or an artificial graft  The vein or graft is attached to your artery above and below your blockage  This allows blood to flow around the blocked portion of your artery  How can I manage PAD? · Do not smoke  Nicotine and other chemicals in cigarettes and cigars can worsen PAD  They can also increase your risk for a heart attack or stroke  Ask your healthcare provider for information if you currently smoke and need help to quit  E-cigarettes or smokeless tobacco still contain nicotine  Talk to your healthcare provider before you use these products  · Manage other health conditions  Take your medicines as directed   Follow your healthcare provider's instructions if you have high blood pressure or high cholesterol  Perform foot care and check your blood sugar levels as directed if you have diabetes  · Eat heart healthy foods  Eat whole grains, fruits, and vegetables every day  Limit salt and high-fat foods  Ask your healthcare provider for more information on a heart healthy diet  Ask if you need to lose weight  Your healthcare provider can help you create a healthy weight-loss plan  Call 911 for the following:   · You have any of the following signs of a heart attack:      ¨ Squeezing, pressure, or pain in your chest that lasts longer than 5 minutes or returns    ¨ Discomfort or pain in your back, neck, jaw, stomach, or arm     ¨ Trouble breathing    ¨ Nausea or vomiting    ¨ Lightheadedness or a sudden cold sweat, especially with chest pain or trouble breathing    · You have any of the following signs of a stroke:      ¨ Numbness or drooping on one side of your face     ¨ Weakness in an arm or leg    ¨ Confusion or difficulty speaking    ¨ Dizziness, a severe headache, or vision loss  When should I seek immediate care? · You have sores or wounds that will not heal      · You notice black or discolored skin on your arm or leg  · Your skin is cool to the touch  When should I contact my healthcare provider? · You have leg pain when you walk 1/8 mile (200 meters) or less, even with treatment  · Your legs are red, dry, or pale, even with treatment  · You have questions or concerns about your condition or care  CARE AGREEMENT:   You have the right to help plan your care  Learn about your health condition and how it may be treated  Discuss treatment options with your caregivers to decide what care you want to receive  You always have the right to refuse treatment  The above information is an  only  It is not intended as medical advice for individual conditions or treatments   Talk to your doctor, nurse or pharmacist before following any medical regimen to see if it is safe and effective for you  © 2017 [address] [employer] Information is for End User's use only and may not be sold, redistributed or otherwise used for commercial purposes  All illustrations and images included in CareNotes® are the copyrighted property of [employer]  or Jamin Anderson

## 2020-10-05 ENCOUNTER — TRANSCRIBE ORDERS (OUTPATIENT)
Dept: ADMINISTRATIVE | Facility: HOSPITAL | Age: 75
End: 2020-10-05

## 2020-10-05 ENCOUNTER — APPOINTMENT (OUTPATIENT)
Dept: LAB | Facility: MEDICAL CENTER | Age: 75
End: 2020-10-05
Payer: COMMERCIAL

## 2020-10-05 DIAGNOSIS — I73.9 PAD (PERIPHERAL ARTERY DISEASE) (HCC): Primary | ICD-10-CM

## 2020-10-05 DIAGNOSIS — Z76.89 ESTABLISHING CARE WITH NEW DOCTOR, ENCOUNTER FOR: ICD-10-CM

## 2020-10-05 DIAGNOSIS — I10 ESSENTIAL HYPERTENSION, BENIGN: ICD-10-CM

## 2020-10-05 DIAGNOSIS — E78.2 MIXED HYPERLIPIDEMIA: ICD-10-CM

## 2020-10-05 DIAGNOSIS — I73.9 PAD (PERIPHERAL ARTERY DISEASE) (HCC): ICD-10-CM

## 2020-10-05 LAB
ANION GAP SERPL CALCULATED.3IONS-SCNC: 3 MMOL/L (ref 4–13)
BUN SERPL-MCNC: 15 MG/DL (ref 5–25)
CALCIUM SERPL-MCNC: 9.7 MG/DL (ref 8.3–10.1)
CHLORIDE SERPL-SCNC: 108 MMOL/L (ref 100–108)
CO2 SERPL-SCNC: 30 MMOL/L (ref 21–32)
CREAT SERPL-MCNC: 1.21 MG/DL (ref 0.6–1.3)
GFR SERPL CREATININE-BSD FRML MDRD: 44 ML/MIN/1.73SQ M
GLUCOSE P FAST SERPL-MCNC: 186 MG/DL (ref 65–99)
POTASSIUM SERPL-SCNC: 4.4 MMOL/L (ref 3.5–5.3)
SODIUM SERPL-SCNC: 141 MMOL/L (ref 136–145)

## 2020-10-05 PROCEDURE — 36415 COLL VENOUS BLD VENIPUNCTURE: CPT

## 2020-10-05 PROCEDURE — 80048 BASIC METABOLIC PNL TOTAL CA: CPT

## 2020-12-01 ENCOUNTER — OFFICE VISIT (OUTPATIENT)
Dept: PODIATRY | Facility: CLINIC | Age: 75
End: 2020-12-01
Payer: COMMERCIAL

## 2020-12-01 VITALS
BODY MASS INDEX: 30.45 KG/M2 | SYSTOLIC BLOOD PRESSURE: 184 MMHG | DIASTOLIC BLOOD PRESSURE: 92 MMHG | HEART RATE: 82 BPM | WEIGHT: 206.2 LBS

## 2020-12-01 DIAGNOSIS — B35.1 ONYCHOMYCOSIS: Primary | ICD-10-CM

## 2020-12-01 DIAGNOSIS — I73.9 PAD (PERIPHERAL ARTERY DISEASE) (HCC): ICD-10-CM

## 2020-12-01 PROCEDURE — 11721 DEBRIDE NAIL 6 OR MORE: CPT | Performed by: PODIATRIST

## 2020-12-23 ENCOUNTER — TRANSCRIBE ORDERS (OUTPATIENT)
Dept: ADMINISTRATIVE | Facility: HOSPITAL | Age: 75
End: 2020-12-23

## 2020-12-23 ENCOUNTER — LAB (OUTPATIENT)
Dept: LAB | Facility: MEDICAL CENTER | Age: 75
End: 2020-12-23
Payer: COMMERCIAL

## 2020-12-23 DIAGNOSIS — E66.9 OBESITY, UNSPECIFIED CLASSIFICATION, UNSPECIFIED OBESITY TYPE, UNSPECIFIED WHETHER SERIOUS COMORBIDITY PRESENT: ICD-10-CM

## 2020-12-23 DIAGNOSIS — E11.9 TYPE 2 DIABETES MELLITUS WITHOUT COMPLICATION, WITHOUT LONG-TERM CURRENT USE OF INSULIN (HCC): ICD-10-CM

## 2020-12-23 DIAGNOSIS — G47.33 OBSTRUCTIVE SLEEP APNEA (ADULT) (PEDIATRIC): ICD-10-CM

## 2020-12-23 DIAGNOSIS — E78.2 MIXED HYPERLIPIDEMIA: ICD-10-CM

## 2020-12-23 DIAGNOSIS — I10 ESSENTIAL HYPERTENSION, MALIGNANT: Primary | ICD-10-CM

## 2020-12-23 DIAGNOSIS — E04.1 NONTOXIC UNINODULAR GOITER: ICD-10-CM

## 2020-12-23 DIAGNOSIS — I10 ESSENTIAL HYPERTENSION, MALIGNANT: ICD-10-CM

## 2020-12-23 DIAGNOSIS — I73.9 PERIPHERAL VASCULAR DISEASE, UNSPECIFIED (HCC): ICD-10-CM

## 2020-12-23 LAB
ANION GAP SERPL CALCULATED.3IONS-SCNC: 5 MMOL/L (ref 4–13)
BUN SERPL-MCNC: 19 MG/DL (ref 5–25)
CALCIUM SERPL-MCNC: 9.4 MG/DL (ref 8.3–10.1)
CHLORIDE SERPL-SCNC: 109 MMOL/L (ref 100–108)
CO2 SERPL-SCNC: 28 MMOL/L (ref 21–32)
CREAT SERPL-MCNC: 1.22 MG/DL (ref 0.6–1.3)
GFR SERPL CREATININE-BSD FRML MDRD: 43 ML/MIN/1.73SQ M
GLUCOSE P FAST SERPL-MCNC: 184 MG/DL (ref 65–99)
POTASSIUM SERPL-SCNC: 4.8 MMOL/L (ref 3.5–5.3)
SODIUM SERPL-SCNC: 142 MMOL/L (ref 136–145)

## 2020-12-23 PROCEDURE — 80048 BASIC METABOLIC PNL TOTAL CA: CPT

## 2020-12-23 PROCEDURE — 36415 COLL VENOUS BLD VENIPUNCTURE: CPT

## 2021-01-27 ENCOUNTER — TELEPHONE (OUTPATIENT)
Dept: VASCULAR SURGERY | Facility: CLINIC | Age: 76
End: 2021-01-27

## 2021-01-28 ENCOUNTER — OFFICE VISIT (OUTPATIENT)
Dept: VASCULAR SURGERY | Facility: CLINIC | Age: 76
End: 2021-01-28
Payer: COMMERCIAL

## 2021-01-28 VITALS
RESPIRATION RATE: 18 BRPM | HEIGHT: 69 IN | HEART RATE: 88 BPM | SYSTOLIC BLOOD PRESSURE: 182 MMHG | DIASTOLIC BLOOD PRESSURE: 92 MMHG | BODY MASS INDEX: 30.36 KG/M2 | WEIGHT: 205 LBS | TEMPERATURE: 98.3 F

## 2021-01-28 DIAGNOSIS — I70.211 ATHEROSCLEROSIS OF NATIVE ARTERY OF RIGHT LOWER EXTREMITY WITH INTERMITTENT CLAUDICATION (HCC): Primary | Chronic | ICD-10-CM

## 2021-01-28 PROBLEM — I70.219 ATHEROSCLER NATIVE ARTERIES THE EXTREMITIES W/INTERMIT CLAUDICATION (HCC): Chronic | Status: ACTIVE | Noted: 2020-02-18

## 2021-01-28 PROCEDURE — 99214 OFFICE O/P EST MOD 30 MIN: CPT | Performed by: SURGERY

## 2021-01-28 NOTE — LETTER
January 28, 2021     [address], [id number] [place] [id number]    Patient: Vladimir Lozoya   YOB: 1945   Date of Visit: 1/28/2021       Dear Dr Lottie Rojas:    Thank you for referring Vladimir Lozoya to me for evaluation  Below are the relevant portions of my assessment and plan of care  Atheroscler native arteries the extremities w/intermit claudication ([address] )  1  Atherosclerotic occlusive disease with mild right calf claudication  This is non limiting  We discussed the pathophysiology of atherosclerotic occlusive disease and the indications for treatment  At this point with her mild symptoms, no further workup or intervention is necessary  I have advised her to adopt a daily walking program     She is already on appropriate antiplatelet and statin therapy  We will plan standard duplex follow-up  2  Atherosclerotic occlusive disease with history of left femoral angioplasty with drug-eluting balloon  She is doing well in this regard and has since healed her foot wound  We discussed the findings of recent duplex which showed wide patency of the previously treated segment  We will plan standard follow-up and treatment as noted above  If you have questions, please do not hesitate to call me  I look forward to following Clovis Gusman along with you           Sincerely,        Marlin Martinez MD        CC: No Recipients

## 2021-01-28 NOTE — PROGRESS NOTES
Assessment/Plan:    Atheroscler native arteries the extremities w/intermit claudication ([address] )  1  Atherosclerotic occlusive disease with mild right calf claudication  This is non limiting  We discussed the pathophysiology of atherosclerotic occlusive disease and the indications for treatment  At this point with her mild symptoms, no further workup or intervention is necessary  I have advised her to adopt a daily walking program     She is already on appropriate antiplatelet and statin therapy  We will plan standard duplex follow-up  2  Atherosclerotic occlusive disease with history of left femoral angioplasty with drug-eluting balloon  She is doing well in this regard and has since healed her foot wound  We discussed the findings of recent duplex which showed wide patency of the previously treated segment  We will plan standard follow-up and treatment as noted above  There are no diagnoses linked to this encounter  Subjective:      Patient ID: Trina Lopez is a 66 y o  female  Patient presents in office for office follow up  Patients last VERN was on 8/18/2020  Patient had an aortagram done in 2018  Patient reports having b/l leg pain R>L after about 100 yards  Patient states after resting for a couple minutes she can walk again  Patient reports a L groin wound for a few months and is seeing dermatology for it  Patient is taking aspirin  40-year-old with known peripheral arterial occlusive disease previously underwent a left femoral -popliteal balloon angioplasty with a drug-eluting balloon for treatment of a nonhealing left foot wound  This has subsequently healed  She has known contralateral disease  On evaluation today she notes some mild right calf cramping when walking distances  This is infrequent and does not impair her ability to perform her daily activities  She denies rest pain    She complains mostly of difficulty with some of her medications which are being adjusted by her primary care physician and cardiologist     Arterial duplex 08/18/2020  On the right there is evidence of femoral -popliteal disease with greater than 75% stenosis proximally and segmental occlusion more distally with ankle-brachial index is 0 54 toe pressure 110  On the left there is no evidence of significant residual focal stenosis in the femoral-popliteal segment with normal ankle-brachial index of 1 1 and toe pressure 153  The following portions of the patient's history were reviewed and updated as appropriate: allergies, current medications, past family history, past medical history, past social history, past surgical history and problem list     I have reviewed and made appropriate changes to the review of systems input by the medical assistant      Vitals:    01/28/21 1403   BP: (!) 182/92   BP Location: Right arm   Patient Position: Sitting   Cuff Size: Adult   Pulse: 88   Resp: 18   Temp: 98 3 °F (36 8 °C)   TempSrc: Tympanic   Weight: 93 kg (205 lb)   Height: 5' 9" (1 753 m)       Patient Active Problem List   Diagnosis    Toe cyanosis    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)    Onychomycosis    Essential hypertension       Past Surgical History:   Procedure Laterality Date    HYSTERECTOMY      IR AORTAGRAM WITH RUN-OFF  11/15/2018    MASTECTOMY      REPLACEMENT TOTAL KNEE Left        Family History   Problem Relation Age of Onset    Diabetes Mother     Hypertension Mother     Atrial fibrillation Father        Social History     Socioeconomic History    Marital status: /Civil Union     Spouse name: Not on file    Number of children: Not on file    Years of education: Not on file    Highest education level: Not on file   Occupational History    Not on file   Social Needs    Financial resource strain: Not on file    Food insecurity     Worry: Not on file     Inability: Not on file    Transportation needs     Medical: Not on file Non-medical: Not on file   Tobacco Use    Smoking status: Never Smoker    Smokeless tobacco: Never Used   Substance and Sexual Activity    Alcohol use: No    Drug use: No    Sexual activity: Not on file   Lifestyle    Physical activity     Days per week: Not on file     Minutes per session: Not on file    Stress: Not on file   Relationships    Social connections     Talks on phone: Not on file     Gets together: Not on file     Attends Mandaen service: Not on file     Active member of club or organization: Not on file     Attends meetings of clubs or organizations: Not on file     Relationship status: Not on file    Intimate partner violence     Fear of current or ex partner: Not on file     Emotionally abused: Not on file     Physically abused: Not on file     Forced sexual activity: Not on file   Other Topics Concern    Not on file   Social History Narrative    Not on file       Allergies   Allergen Reactions    Amlodipine GI Intolerance    Atenolol GI Intolerance    Conjugated Estrogens Hypertension    Felodipine GI Intolerance and Dizziness     Per pt    Furosemide GI Intolerance    Hydrochlorothiazide W-Triamterene GI Intolerance    Labetalol GI Intolerance and Dizziness     Per pt      Losartan      Headache, nausea    Meperidine     Metoprolol GI Intolerance    Morphine And Related      hallucinations  hallucinations    Neosporin [Neomycin-Bacitracin Zn-Polymyx]      rash  rash    Spironolactone Other (See Comments)     optical migraine    Sudafed [Pseudoephedrine]     Colloidal Oatmeal Rash         Current Outpatient Medications:     ALPRAZolam (XANAX) 0 25 mg tablet, Take 0 25 mg by mouth as needed , Disp: , Rfl:     aspirin (ECOTRIN LOW STRENGTH) 81 mg EC tablet, Take 81 mg by mouth daily, Disp: , Rfl:     diphenhydrAMINE (BENADRYL) 25 mg tablet, Take 25 mg by mouth 2 (two) times a day, Disp: , Rfl:     glimepiride (AMARYL) 4 mg tablet, Take 4 mg by mouth daily with breakfast , Disp: , Rfl:     lovastatin (MEVACOR) 40 MG tablet, , Disp: , Rfl:     quinapril (ACCUPRIL) 40 MG tablet, Take 40 mg by mouth 2 (two) times a day  , Disp: , Rfl:     ibuprofen (MOTRIN) 200 mg tablet, Take by mouth every 6 (six) hours as needed for mild pain, Disp: , Rfl:        Review of Systems   Constitutional: Negative  Negative for chills and fever  HENT: Positive for postnasal drip  Negative for trouble swallowing  Eyes: Negative  Respiratory: Positive for cough  Negative for chest tightness and shortness of breath  Cardiovascular: Negative  Gastrointestinal: Negative  Negative for diarrhea, nausea and vomiting  Endocrine: Negative  Genitourinary: Negative  Musculoskeletal: Positive for gait problem  Leg pain   Skin: Positive for wound (L groin )  Allergic/Immunologic: Negative  Hematological: Negative  Does not bruise/bleed easily  Psychiatric/Behavioral: Negative  Negative for self-injury and suicidal ideas  Objective:      BP (!) 182/92 (BP Location: Right arm, Patient Position: Sitting, Cuff Size: Adult)   Pulse 88   Temp 98 3 °F (36 8 °C) (Tympanic)   Resp 18   Ht 5' 9" (1 753 m)   Wt 93 kg (205 lb)   BMI 30 27 kg/m²          Physical Exam  Constitutional:       Appearance: Normal appearance  She is well-developed  HENT:      Head: Normocephalic and atraumatic  Eyes:      Conjunctiva/sclera: Conjunctivae normal    Neck:      Musculoskeletal: Normal range of motion and neck supple  Vascular: No carotid bruit or JVD  Cardiovascular:      Rate and Rhythm: Normal rate and regular rhythm  Pulses:           Carotid pulses are 2+ on the right side and 2+ on the left side  Radial pulses are 2+ on the right side and 2+ on the left side  Femoral pulses are 2+ on the right side and 2+ on the left side  Popliteal pulses are 0 on the right side and 2+ on the left side          Dorsalis pedis pulses are 0 on the right side and 2+ on the left side  Posterior tibial pulses are 0 on the right side and 2+ on the left side  Heart sounds: Normal heart sounds, S1 normal and S2 normal  No murmur  Pulmonary:      Effort: Pulmonary effort is normal       Breath sounds: Normal breath sounds  Abdominal:      General: There is no abdominal bruit  Palpations: Abdomen is soft  There is no pulsatile mass  Tenderness: There is no abdominal tenderness  Hernia: No hernia is present  Musculoskeletal: Normal range of motion  General: No tenderness or deformity  Skin:     General: Skin is warm and dry  Coloration: Skin is not pale  Neurological:      Mental Status: She is alert and oriented to person, place, and time  Sensory: No sensory deficit     Psychiatric:         Speech: Speech normal          Behavior: Behavior normal

## 2021-01-28 NOTE — PATIENT INSTRUCTIONS
Atheroscler native arteries the extremities w/intermit claudication ([address] )  1  Atherosclerotic occlusive disease with mild right calf claudication  This is non limiting  We discussed the pathophysiology of atherosclerotic occlusive disease and the indications for treatment  At this point with her mild symptoms, no further workup or intervention is necessary  I have advised her to adopt a daily walking program     She is already on appropriate antiplatelet and statin therapy  We will plan standard duplex follow-up  2  Atherosclerotic occlusive disease with history of left femoral angioplasty with drug-eluting balloon  She is doing well in this regard and has since healed her foot wound  We discussed the findings of recent duplex which showed wide patency of the previously treated segment  We will plan standard follow-up and treatment as noted above

## 2021-01-28 NOTE — ASSESSMENT & PLAN NOTE
1   Atherosclerotic occlusive disease with mild right calf claudication  This is non limiting  We discussed the pathophysiology of atherosclerotic occlusive disease and the indications for treatment  At this point with her mild symptoms, no further workup or intervention is necessary  I have advised her to adopt a daily walking program     She is already on appropriate antiplatelet and statin therapy  We will plan standard duplex follow-up  2  Atherosclerotic occlusive disease with history of left femoral angioplasty with drug-eluting balloon  She is doing well in this regard and has since healed her foot wound  We discussed the findings of recent duplex which showed wide patency of the previously treated segment  We will plan standard follow-up and treatment as noted above

## 2021-03-02 ENCOUNTER — OFFICE VISIT (OUTPATIENT)
Dept: PODIATRY | Facility: CLINIC | Age: 76
End: 2021-03-02
Payer: COMMERCIAL

## 2021-03-02 VITALS
DIASTOLIC BLOOD PRESSURE: 84 MMHG | WEIGHT: 208 LBS | BODY MASS INDEX: 30.72 KG/M2 | HEART RATE: 83 BPM | SYSTOLIC BLOOD PRESSURE: 168 MMHG

## 2021-03-02 DIAGNOSIS — I70.211 ATHEROSCLEROSIS OF NATIVE ARTERY OF RIGHT LOWER EXTREMITY WITH INTERMITTENT CLAUDICATION (HCC): Chronic | ICD-10-CM

## 2021-03-02 DIAGNOSIS — B35.1 ONYCHOMYCOSIS: Primary | ICD-10-CM

## 2021-03-02 PROCEDURE — 11721 DEBRIDE NAIL 6 OR MORE: CPT | Performed by: PODIATRIST

## 2021-03-02 RX ORDER — ACETAMINOPHEN 325 MG/1
650 TABLET ORAL EVERY 6 HOURS PRN
COMMUNITY

## 2021-03-03 NOTE — PROGRESS NOTES
Date Patient Seen: 3/2/21       Subjective:     Chief Complaint:  High risk footcare     Patient ID: Hill Salcido is a 66 y o  female  Harmonysukhwinder Rushing is here for high risk foot care  She has a longstanding history of PAD  [facility]  Pt denies any numbness, tingling, or  coldness   Pt denies any open wounds  Pt denies Claudication or rest pain  Pt taking ASA 81 mg  and Lovastatin  She is 49-year-old female with HTN, HLD, PAD s/p L SFA angioplasty '18 ([facility]) who returns to the office for yearly visit to review LEAD 2/12/20  LEAD showed - R SFA >75% stenosis followed by short segment occlusion, R JOANA 0 56/100/76 and L diffuse disease without focal stenosis, L JOANA 1 17/108/21                      The following portions of the patient's history were reviewed and updated as appropriate: allergies, current medications, past family history, past medical history, past social history, past surgical history and problem list     Review of Systems   Constitutional: Negative  Respiratory: Negative  Cardiovascular: Negative  Gastrointestinal: Negative  Musculoskeletal: Negative  Hematological: Negative  Psychiatric/Behavioral: Negative  Objective:      /84   Pulse 83   Wt 94 3 kg (208 lb)   BMI 30 72 kg/m²        Physical Exam  Vitals signs and nursing note reviewed  Constitutional:       General: She is not in acute distress  Appearance: Normal appearance  She is not ill-appearing, toxic-appearing or diaphoretic  HENT:      Head: Normocephalic  Cardiovascular:      Rate and Rhythm: Normal rate  Pulses:           Dorsalis pedis pulses are 0 on the right side and 1+ on the left side  Posterior tibial pulses are 0 on the right side and 1+ on the left side  Pulmonary:      Effort: Pulmonary effort is normal    Abdominal:      General: Bowel sounds are normal    Musculoskeletal:        Feet:    Feet:      Right foot:      Protective Sensation: 10 sites tested  10 sites sensed  Left foot:      Protective Sensation: 10 sites tested  10 sites sensed  Skin:     Comments: Nails yellow-brown, 5mm thick, dystrophic, with crumbling subugunal debris x 10  Neurological:      General: No focal deficit present  Mental Status: She is alert  Psychiatric:         Mood and Affect: Mood normal             There are no diagnoses linked to this encounter  Assessment:  Onychomycosis  PAD    Plan:  High risk  foot precautions reviewed with patient including the need to wear protective shoegear at all times when walking including in the home, the need to check feet all surfaces daily with a mirror and report and skin breaks to podiatry, the need to apply an emollient to skin of feet daily  Nails x 10 were debrided with double-action nail forceps of their thickness, length and width

## 2021-03-03 NOTE — PATIENT INSTRUCTIONS
Foot Care for People with Diabetes   WHAT YOU NEED TO KNOW:   · Foot care helps protect your feet and prevent foot ulcers or sores  Long-term high blood sugar levels can damage the blood vessels and nerves in your legs and feet  This damage makes it hard to feel pressure, pain, temperature, and touch  You may not be able to feel a cut or sore, or shoes that are too tight  Foot care is needed to prevent serious problems, such as an infection or amputation  · Diabetes may cause your toes to become crooked or curved under  These changes may affect the way you walk and can lead to increased pressure on your foot  The pressure can decrease blood flow to your feet  Lack of blood flow increases your risk for a foot ulcer  Do not ignore small problems, such as dry skin or small wounds  These can become life-threatening over time without proper care  DISCHARGE INSTRUCTIONS:   Call your care team provider if:   · Your feet become numb, weak, or hard to move  · You have pus draining from a sore on your foot  · You have a wound on your foot that gets bigger, deeper, or does not heal      · You see blisters, cuts, scratches, calluses, or sores on your foot  · You have a fever, and your feet become red, warm, and swollen  · Your toenails become thick, curled, or yellow  · You find it hard to check your feet because your vision is poor  · You have questions or concerns about your condition or care  Foot care:   · Check your feet each day  Look at your whole foot, including the bottom, and between and under your toes  Check for wounds, corns, and calluses  Use a mirror to see the bottom of your feet  The skin on your feet may be shiny, tight, or darker than normal  Your feet may also be cold and pale  Feel your feet by running your hands along the tops, bottoms, sides, and between your toes  Redness, swelling, and warmth are signs of blood flow problems that can lead to a foot ulcer   Do not try to remove corns or calluses yourself  · Wash your feet each day with soap and warm water  Do not use hot water, because this can injure your foot  Dry your feet gently with a towel after you wash them  Dry between and under your toes  · Apply lotion or a moisturizer on your dry feet  Ask your care team provider what lotions are best to use  Do not put lotion or moisturizer between your toes  Moisture between your toes could lead to skin breakdown  · Cut your toenails correctly  File or cut your toenails straight across  Use a soft brush to clean around your toenails  If your toenails are very thick, you may need to have a care team provider or specialist cut them  · Protect your feet  Do not walk barefoot or wear your shoes without socks  Check your shoes for rocks or other objects that can hurt your feet  Wear cotton socks to help keep your feet dry  Wear socks without toe seams, or wear them with the seams inside out  Change your socks each day  Do not wear socks that are dirty or damp  · Wear shoes that fit well  Wear shoes that do not rub against any area of your feet  Your shoes should be ½ to ¾ inch (1 to 2 centimeters) longer than your feet  Your shoes should also have extra space around the widest part of your feet  Walking or athletic shoes with laces or straps that adjust are best  Ask your care team provider for help to choose shoes that fit you best  Ask him or her if you need to wear an insert, orthotic, or bandage on your feet  · Do not smoke  Smoking can damage your blood vessels and put you at increased risk for foot ulcers  Ask your care team provider for information if you currently smoke and need help to quit  E-cigarettes or smokeless tobacco still contain nicotine  Talk to your care team provider before you use these products  Follow up with your diabetes care team provider or foot specialist as directed:   You will need to have your feet checked at least once a [address] may need a foot exam more often if you have nerve damage, foot deformities, or ulcers  Write down your questions so you remember to ask them during your visits  © Copyright [address] Information is for End User's use only and may not be sold, redistributed or otherwise used for commercial purposes  All illustrations and images included in CareNotes® are the copyrighted property of A D A M , Inc  or [facility] Caron Hansen   The above information is an  only  It is not intended as medical advice for individual conditions or treatments  Talk to your doctor, nurse or pharmacist before following any medical regimen to see if it is safe and effective for you

## 2021-03-15 ENCOUNTER — HOSPITAL ENCOUNTER (OUTPATIENT)
Dept: NON INVASIVE DIAGNOSTICS | Facility: CLINIC | Age: 76
Discharge: HOME | End: 2021-03-15
Payer: COMMERCIAL

## 2021-03-15 DIAGNOSIS — I73.9 PAD (PERIPHERAL ARTERY DISEASE) (HCC): ICD-10-CM

## 2021-03-15 PROCEDURE — 93925 LOWER EXTREMITY STUDY: CPT | Performed by: SURGERY

## 2021-03-15 PROCEDURE — 93923 UPR/LXTR ART STDY 3+ LVLS: CPT

## 2021-03-15 PROCEDURE — 93925 LOWER EXTREMITY STUDY: CPT

## 2021-03-15 PROCEDURE — 93922 UPR/L XTREMITY ART 2 LEVELS: CPT | Performed by: SURGERY

## 2021-03-17 ENCOUNTER — APPOINTMENT (OUTPATIENT)
Dept: LAB | Facility: MEDICAL CENTER | Age: 76
End: 2021-03-17
Payer: COMMERCIAL

## 2021-03-17 ENCOUNTER — TRANSCRIBE ORDERS (OUTPATIENT)
Dept: ADMINISTRATIVE | Facility: HOSPITAL | Age: 76
End: 2021-03-17

## 2021-03-17 DIAGNOSIS — E78.2 MIXED HYPERLIPIDEMIA: Primary | ICD-10-CM

## 2021-03-17 LAB
CHOLEST SERPL-MCNC: 181 MG/DL (ref 50–200)
HDLC SERPL-MCNC: 34 MG/DL
LDLC SERPL CALC-MCNC: 84 MG/DL (ref 0–100)
NONHDLC SERPL-MCNC: 147 MG/DL
TRIGL SERPL-MCNC: 315 MG/DL

## 2021-03-17 PROCEDURE — 80061 LIPID PANEL: CPT | Performed by: INTERNAL MEDICINE

## 2021-03-17 PROCEDURE — 36415 COLL VENOUS BLD VENIPUNCTURE: CPT | Performed by: INTERNAL MEDICINE

## 2021-03-25 ENCOUNTER — TELEPHONE (OUTPATIENT)
Dept: SURGICAL ONCOLOGY | Facility: CLINIC | Age: 76
End: 2021-03-25

## 2021-03-25 PROBLEM — C43.9 MELANOMA (HCC): Status: ACTIVE | Noted: 2021-03-25

## 2021-03-25 NOTE — TELEPHONE ENCOUNTER
New Patient Intake Melanoma   Patient Details:     Odilia Collins     1945     [id number]     Background Information:   [address] starts by opening a telephone encounter and gathering the following information   Who is calling to schedule?  provider   Referring Provider RYAN Oliva   To Which Speciality? Surgical Oncology   Reason for Visit? New Diagnosis   Tumor Type/Diagnosis? melanoma   Is there a confirmed diagnosis from Biopsy/tissue reviewed by Pathology? yes   Date of Tissue Diagnosis  If outside of [facility] obtain report and slides March   Is the patient aware of diagnosis? yes   Has Imaging been done? no   If so, when and where? If outside of [facility], obtain records and disk na   Has Bloodwork been done? no   If so, when and where? If outside of [facility], obtain records  na   Is there a personal history of cancer? If yes, what kind? no   Is there a family history of cancer? If so, what kind? no    Scheduling Information:   Preferred [address], [address]   Are there any days the patient cannot be seen? na   Miscellaneous:     After completing the above information, please route to finance, nurse navigation and clinical trials for review

## 2021-03-30 ENCOUNTER — CONSULT (OUTPATIENT)
Dept: SURGICAL ONCOLOGY | Facility: CLINIC | Age: 76
End: 2021-03-30
Payer: COMMERCIAL

## 2021-03-30 VITALS
HEART RATE: 96 BPM | HEIGHT: 69 IN | WEIGHT: 206 LBS | BODY MASS INDEX: 30.51 KG/M2 | TEMPERATURE: 98.6 F | DIASTOLIC BLOOD PRESSURE: 92 MMHG | RESPIRATION RATE: 17 BRPM | SYSTOLIC BLOOD PRESSURE: 132 MMHG

## 2021-03-30 DIAGNOSIS — C43.59 MALIGNANT MELANOMA OF TORSO EXCLUDING BREAST (HCC): Primary | ICD-10-CM

## 2021-03-30 PROCEDURE — 99204 OFFICE O/P NEW MOD 45 MIN: CPT | Performed by: SURGERY

## 2021-03-30 NOTE — LETTER
March 30, 2021     Antoinette Rothman, [address] [place] [address]    Patient: Arely Lambert   YOB: 1945   Date of Visit: 3/30/2021       Dear Dr Dru Broussard:    Thank you for referring Pecolia Sat to me for evaluation  Below are my notes for this consultation  If you have questions, please do not hesitate to call me  I look forward to following your patient along with you  Sincerely,        Susan Ojeda MD        CC: ANGELINA Conde MD  3/30/2021 10:39 AM  Incomplete               Surgical Oncology Consult       [facility] [address] [id number] [phone]    Arely Lambert  1945  [id number]  [facility] [address] [id number] [phone]    Diagnoses and all orders for this visit:    Malignant melanoma of torso excluding breast ([facility] [address] )        No chief complaint on file  No follow-ups on file  Oncology History   Melanoma ([address] )   3/18/2021 Initial Diagnosis    Melanoma ([facility] [address] )     3/18/2021 Biopsy    Left upper back shave biopsy:  Malignant melanoma   - at least 0 4 mm thickness   - 0 mitosis   - negative ulceration         History of Present Illness:  64-year-old female with a history of melanoma of her face 2018  She also has a history of basal and squamous cell carcinomas  She had a lesion on her left upper back recently that was itching  She brought this to the attention of her dermatologist and biopsy was performed  This revealed a malignant melanoma, 0 4 mm  All her margins were  Involved  The lesion was not ulcerated  She comes in now to discuss further therapy  She denies any new abdominal pain, nausea, vomiting, back pain, bone pain headaches, or cough  No change in appetite or unintentional weight loss      Review of Systems  Complete ROS Surg Onc: Constitutional: The patient denies new or recent history of general fatigue, no recent weight loss, no change in appetite  Eyes: No complaints of visual problems, no scleral icterus  ENT: no complaints of ear pain, no hoarseness, no difficulty swallowing,  no tinnitus and no new masses in head, oral cavity, or neck  Cardiovascular: No complaints of chest pain, no palpitations, no ankle edema  Respiratory: No complaints of shortness of breath, no cough  Gastrointestinal: No complaints of jaundice, no bloody stools, no pale stools  Genitourinary: No complaints of dysuria, no hematuria, no nocturia, no frequent urination, no urethral discharge  Musculoskeletal: No complaints of weakness, paralysis, joint stiffness or arthralgias  Integumentary: No complaints of rash, no new lesions  Neurological: No complaints of convulsions, no seizures, no dizziness  Hematologic/Lymphatic: No complaints of easy bruising  Endocrine:  No hot or cold intolerance  No polydipsia, polyphagia, or polyuria  Allergy/immunology:  No environmental allergies  No food allergies  Not immunocompromised  Skin:  No pallor or rash  No wound            Patient Active Problem List   Diagnosis    Toe cyanosis    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)    Onychomycosis    Essential hypertension    Melanoma ([address] )     Past Medical History:   Diagnosis Date    HTN (hypertension)     Melanoma of face ([address] )      Past Surgical History:   Procedure Laterality Date    HYSTERECTOMY      IR AORTAGRAM WITH RUN-OFF  11/15/2018    MASTECTOMY      REPLACEMENT TOTAL KNEE Left      Family History   Problem Relation Age of Onset    Diabetes Mother     Hypertension Mother     Atrial fibrillation Father      Social History     Socioeconomic History    Marital status: /Civil Union     Spouse name: Not on file    Number of children: Not on file    Years of education: Not on file    Highest education level: Not on file   Occupational History    Not on file   Social Needs    Financial resource strain: Not on file    Food insecurity     Worry: Not on file     Inability: Not on file    Transportation needs     Medical: Not on file     Non-medical: Not on file   Tobacco Use    Smoking status: Never Smoker    Smokeless tobacco: Never Used   Substance and Sexual Activity    Alcohol use: No    Drug use: No    Sexual activity: Not on file   Lifestyle    Physical activity     Days per week: Not on file     Minutes per session: Not on file    Stress: Not on file   Relationships    Social connections     Talks on phone: Not on file     Gets together: Not on file     Attends [employer] service: Not on file     Active member of club or organization: Not on file     Attends meetings of clubs or organizations: Not on file     Relationship status: Not on file    Intimate partner violence     Fear of current or ex partner: Not on file     Emotionally abused: Not on file     Physically abused: Not on file     Forced sexual activity: Not on file   Other Topics Concern    Not on file   Social History Narrative    Not on file       Current Outpatient Medications:     acetaminophen (TYLENOL) 325 mg tablet, Take 650 mg by mouth every 6 (six) hours as needed for mild pain, Disp: , Rfl:     ALPRAZolam (XANAX) 0 25 mg tablet, Take 0 25 mg by mouth as needed , Disp: , Rfl:     aspirin (ECOTRIN LOW STRENGTH) 81 mg EC tablet, Take 81 mg by mouth daily, Disp: , Rfl:     diphenhydrAMINE (BENADRYL) 25 mg tablet, Take 25 mg by mouth 2 (two) times a day, Disp: , Rfl:     glimepiride (AMARYL) 4 mg tablet, Take 4 mg by mouth daily with breakfast , Disp: , Rfl:     lovastatin (MEVACOR) 40 MG tablet, , Disp: , Rfl:     quinapril (ACCUPRIL) 40 MG tablet, Take 40 mg by mouth 2 (two) times a day  , Disp: , Rfl:     ibuprofen (MOTRIN) 200 mg tablet, Take by mouth every 6 (six) hours as needed for mild pain, Disp: , Rfl: Allergies   Allergen Reactions    Amlodipine GI Intolerance    Atenolol GI Intolerance    Conjugated Estrogens Hypertension    Felodipine GI Intolerance and Dizziness     Per pt    Furosemide GI Intolerance    Hydrochlorothiazide W-Triamterene GI Intolerance    Labetalol GI Intolerance and Dizziness     Per pt      Losartan      Headache, nausea    Meperidine     Metoprolol GI Intolerance    Morphine And Related      hallucinations  hallucinations    Neosporin [Neomycin-Bacitracin Zn-Polymyx]      rash  rash    Spironolactone Other (See Comments)     optical migraine    Sudafed [Pseudoephedrine]     Colloidal Oatmeal Rash     Vitals:    03/30/21 0958   BP: 132/92   Pulse: 96   Resp: 17   Temp: 98 6 °F (37 °C)       Physical Exam   Constitutional: General appearance: The Patient is well-developed and well-nourished who appears the stated age in no acute distress  Patient is pleasant and talkative  HEENT:  Normocephalic  Sclerae are anicteric  Mucous membranes are moist  Neck is supple without adenopathy  No JVD  Chest: The lungs are clear to auscultation  Cardiac: Heart is regular rate  Abdomen: Abdomen is soft, non-tender, non-distended and without masses  Extremities: There is no clubbing or cyanosis  There is no edema  Symmetric  Neuro: Grossly nonfocal  Gait is normal      Lymphatic: No evidence of cervical adenopathy bilaterally  No evidence of axillary adenopathy bilaterally  No evidence of inguinal adenopathy bilaterally  Skin: Warm, anicteric  Biopsy site is healed well  There is no evidence of local recurrence  There is no large residual tumor  Psych:  Patient is pleasant and talkative  Breasts:      Pathology:  [unfilled]    Labs:      Imaging  Vas Lower Limb Arterial Duplex, Complete Bilateral    Result Date: 3/15/2021  Narrative:  [facility] REPORT CLINICAL: Indications:  PVD, Unspecified [I73 9]   6 month surveillance for progression of disease  The patient is able to walk few blocks before getting pain  Operative History: 2018-11-15 Left SFA, PTA Risk Factors: HTN, Diabetes (NIDDM), HLD and PAD  Clinical Right Pressure:  168/ mm Hg, Left Pressure:  179/ mm Hg  FINDINGS:  Right                  Impression  Waveform    PSV (cm/s)  EDV  Post  Tibial                       Monophasic                   Ant  Tibial                        Monophasic                   Common Femoral Artery                                  65    0  Prox Profunda                                          89    0  Prox SFA               >75%                           352    0  Mid SFA                Occluded                         0    0  Dist SFA                                               21    0  Proximal Pop                                           29    5  Distal Pop                                             21    4  Tibioperoneal                                          19       Dist Post Tibial                                       14    0  Dist  Ant  Tibial                                      27    0  Dist Peroneal                                           9    0   Left                   Waveform  PSV (cm/s)  EDV  Post  Tibial           Biphasic                   Ant  Tibial            Biphasic                   Common Femoral Artery                   111    3  Prox Profunda                           159   10  Prox SFA                                142   10  Mid SFA                                 197   10  Dist SFA                                198    0  Proximal Pop                            172   14  Distal Pop                               87    4  Tibioperoneal                            67       Dist Post Tibial                         55    0  Dist  Ant   Tibial                        60    5  Dist Peroneal                            49    0     CONCLUSION:  Impression: RIGHT LOWER LIMB: There is a >75% stenosis in the proximal to mid superficial femoral artery followed by a short segment occlusion in the mid superficial femoral artery  Ankle/Brachial index: 0 53 which is in the severe claudication range  Prior: 0 54 PVR/ PPG tracings are dampened  Metatarsal pressure of 101 mm Hg Great toe pressure of 80 mm Hg, within the healing range for a diabetic  Pedal Acceleration time: Dorsal:  110 ms     Plantar: 40 ms LEFT LOWER LIMB: Moderate diffuse disease throughout the femoral- popliteal arteries with no significant restenosis post angioplasty  Ankle/Brachial index:  1 16, normal   Prior:  1 09 PVR/ PPG tracings are normal  Metatarsal pressure of 162 mm Hg Great toe pressure of 142 mm Hg, within the healing range for a diabetic  Pedal Acceleration time: Dorsal:  80 ms     Plantar: 60 ms  Compared to previous study on 08/18/2020, there is no significant interval change  SIGNATURE: Electronically Signed by: Ken Smith on 2021-03-15 05:09:46 PM    I reviewed the above laboratory and imaging data  Discussion/Summary:   A 47-year-old female with a clinical W2uH2T1 melanoma  We had a long discussion regarding treatment options  She has no signs or symptoms of metastasis  She should not require any staging workup  Her tumor is thin, but margins are involved  We will have her pathology reviewed  I discussed that with a 0 4 mm melanoma and negative margins, the risk of having a positive sentinel node is under 3%  This risk would increase if the melanoma was 0 75-0 8 mm  We discussed the reasoning and rationale behind sentinel lymph node biopsy  We also discussed that if wide excision alone was performed, there is a small risk of finding a deeper residual tumor that would mandate sentinel node biopsy  I also discussed that in this scenario, more nodes would likely be removed after wide excision necessitating delayed sentinel node biopsy  After some discussion we have elected on wide excision in the office only    The risks were explained including bleeding, infection, recurrence, need for further surgery, wound complications  Informed consent was obtained  We will schedule this at our earliest mutual convenience  She is agreeable to this  All her questions were answered

## 2021-03-30 NOTE — PROGRESS NOTES
Surgical Oncology Consult       [address] [facility] [place]  [address] [facility] [id number] [phone]    Odilia Collins  1945  [id number]  [address] [facility] [address] [id number] [phone]    Diagnoses and all orders for this visit:    Malignant melanoma of torso excluding breast ([address] )        No chief complaint on file  No follow-ups on file  Oncology History   Melanoma ([facility] [address] )   3/18/2021 Initial Diagnosis    Melanoma ([facility] [address] )     3/18/2021 Biopsy    Left upper back shave biopsy:  Malignant melanoma   - at least 0 4 mm thickness   - 0 mitosis   - negative ulceration         History of Present Illness:  47-year-old female with a history of melanoma of her face 2018  She also has a history of basal and squamous cell carcinomas  She had a lesion on her left upper back recently that was itching  She brought this to the attention of her dermatologist and biopsy was performed  This revealed a malignant melanoma, 0 4 mm  All her margins were  Involved  The lesion was not ulcerated  She comes in now to discuss further therapy  She denies any new abdominal pain, nausea, vomiting, back pain, bone pain headaches, or cough  No change in appetite or unintentional weight loss  Review of Systems  Complete ROS Surg Onc:   Constitutional: The patient denies new or recent history of general fatigue, no recent weight loss, no change in appetite  Eyes: No complaints of visual problems, no scleral icterus  ENT: no complaints of ear pain, no hoarseness, no difficulty swallowing,  no tinnitus and no new masses in head, oral cavity, or neck  Cardiovascular: No complaints of chest pain, no palpitations, no ankle edema  Respiratory: No complaints of shortness of breath, no cough     Gastrointestinal: No complaints of jaundice, no bloody stools, no pale stools  Genitourinary: No complaints of dysuria, no hematuria, no nocturia, no frequent urination, no urethral discharge  Musculoskeletal: No complaints of weakness, paralysis, joint stiffness or arthralgias  Integumentary: No complaints of rash, no new lesions  Neurological: No complaints of convulsions, no seizures, no dizziness  Hematologic/Lymphatic: No complaints of easy bruising  Endocrine:  No hot or cold intolerance  No polydipsia, polyphagia, or polyuria  Allergy/immunology:  No environmental allergies  No food allergies  Not immunocompromised  Skin:  No pallor or rash  No wound            Patient Active Problem List   Diagnosis    Toe cyanosis    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)    Onychomycosis    Essential hypertension    Melanoma ([facility] [address] )     Past Medical History:   Diagnosis Date    HTN (hypertension)     [facility]      Past Surgical History:   Procedure Laterality Date    HYSTERECTOMY      IR AORTAGRAM WITH RUN-OFF  11/15/2018    MASTECTOMY      REPLACEMENT TOTAL KNEE Left      Family History   Problem Relation Age of Onset    Diabetes Mother     Hypertension Mother     Atrial fibrillation Father      Social History     Socioeconomic History    Marital status: /Civil Union     Spouse name: Not on file    Number of children: Not on file    Years of education: Not on file    Highest education level: Not on file   Occupational History    Not on file   Social Needs    Financial resource strain: Not on file    Food insecurity     Worry: Not on file     Inability: Not on file   [employer] needs     Medical: Not on file     Non-medical: Not on file   Tobacco Use    Smoking status: Never Smoker    Smokeless tobacco: Never Used   Substance and Sexual Activity    Alcohol use: No    Drug use: No    Sexual activity: Not on file   Lifestyle    Physical activity     Days per week: Not on file     Minutes per session: Not on file    Stress: Not on file   Relationships    Social connections     Talks on phone: Not on file     Gets together: Not on file     Attends [employer] service: Not on file     Active member of club or organization: Not on file     Attends meetings of clubs or organizations: Not on file     Relationship status: Not on file    Intimate partner violence     Fear of current or ex partner: Not on file     Emotionally abused: Not on file     Physically abused: Not on file     Forced sexual activity: Not on file   Other Topics Concern    Not on file   Social History Narrative    Not on file       Current Outpatient Medications:     acetaminophen (TYLENOL) 325 mg tablet, Take 650 mg by mouth every 6 (six) hours as needed for mild pain, Disp: , Rfl:     ALPRAZolam (XANAX) 0 25 mg tablet, Take 0 25 mg by mouth as needed , Disp: , Rfl:     aspirin (ECOTRIN LOW STRENGTH) 81 mg EC tablet, Take 81 mg by mouth daily, Disp: , Rfl:     diphenhydrAMINE (BENADRYL) 25 mg tablet, Take 25 mg by mouth 2 (two) times a day, Disp: , Rfl:     glimepiride (AMARYL) 4 mg tablet, Take 4 mg by mouth daily with breakfast , Disp: , Rfl:     lovastatin (MEVACOR) 40 MG tablet, , Disp: , Rfl:     quinapril (ACCUPRIL) 40 MG tablet, Take 40 mg by mouth 2 (two) times a day  , Disp: , Rfl:     ibuprofen (MOTRIN) 200 mg tablet, Take by mouth every 6 (six) hours as needed for mild pain, Disp: , Rfl:   Allergies   Allergen Reactions    Amlodipine GI Intolerance    Atenolol GI Intolerance    Conjugated Estrogens Hypertension    Felodipine GI Intolerance and Dizziness     Per pt    Furosemide GI Intolerance    Hydrochlorothiazide W-Triamterene GI Intolerance    Labetalol GI Intolerance and Dizziness     Per pt      Losartan      Headache, nausea    Meperidine     Metoprolol GI Intolerance    Morphine And Related      hallucinations  hallucinations    Neosporin [Neomycin-Bacitracin Zn-Polymyx]      rash  rash    Spironolactone Other (See Comments)     optical migraine    Sudafed [Pseudoephedrine]     Colloidal Oatmeal Rash     Vitals:    03/30/21 0958   BP: 132/92   Pulse: 96   Resp: 17   Temp: 98 6 °F (37 °C)       Physical Exam   Constitutional: General appearance: The Patient is well-developed and well-nourished who appears the stated age in no acute distress  Patient is pleasant and talkative  HEENT:  Normocephalic  Sclerae are anicteric  Mucous membranes are moist  Neck is supple without adenopathy  No JVD  Chest: The lungs are clear to auscultation  Cardiac: Heart is regular rate  Abdomen: Abdomen is soft, non-tender, non-distended and without masses  Extremities: There is no clubbing or cyanosis  There is no edema  Symmetric  Neuro: Grossly nonfocal  Gait is normal      Lymphatic: No evidence of cervical adenopathy bilaterally  No evidence of axillary adenopathy bilaterally  No evidence of inguinal adenopathy bilaterally  Skin: Warm, anicteric  Biopsy site is healed well  There is no evidence of local recurrence  There is no large residual tumor  Psych:  Patient is pleasant and talkative  Breasts:      Pathology:  [unfilled]    Labs:      Imaging  Vas Lower Limb Arterial Duplex, Complete Bilateral    Result Date: 3/15/2021  Narrative:  [facility] REPORT CLINICAL: Indications:  PVD, Unspecified [I73 9]  6 month surveillance for progression of disease  The patient is able to walk few blocks before getting pain  Operative History: 2018-11-15 Left SFA, PTA Risk Factors: HTN, Diabetes (NIDDM), HLD and PAD  Clinical Right Pressure:  168/ mm Hg, Left Pressure:  179/ mm Hg  FINDINGS:  Right                  Impression  Waveform    PSV (cm/s)  EDV  Post  Tibial                       Monophasic                   Ant   Tibial                        Monophasic                   Common Femoral Artery                                  65    0  Prox Profunda 89    0  Prox SFA               >75%                           352    0  Mid SFA                Occluded                         0    0  Dist SFA                                               21    0  Proximal Pop                                           29    5  Distal Pop                                             21    4  Tibioperoneal                                          19       Dist Post Tibial                                       14    0  Dist  Ant  Tibial                                      27    0  Dist Peroneal                                           9    0   Left                   Waveform  PSV (cm/s)  EDV  Post  Tibial           Biphasic                   Ant  Tibial            Biphasic                   Common Femoral Artery                   111    3  Prox Profunda                           159   10  Prox SFA                                142   10  Mid SFA                                 197   10  Dist SFA                                198    0  Proximal Pop                            172   14  Distal Pop                               87    4  Tibioperoneal                            67       Dist Post Tibial                         55    0  Dist  Ant  Tibial                        60    5  Dist Peroneal                            49    0     CONCLUSION:  Impression: RIGHT LOWER LIMB: There is a >75% stenosis in the proximal to mid superficial femoral artery followed by a short segment occlusion in the mid superficial femoral artery  Ankle/Brachial index: 0 53 which is in the severe claudication range  Prior: 0 54 PVR/ PPG tracings are dampened  Metatarsal pressure of 101 mm Hg Great toe pressure of 80 mm Hg, within the healing range for a diabetic  Pedal Acceleration time: Dorsal:  110 ms     Plantar: 40 ms LEFT LOWER LIMB: Moderate diffuse disease throughout the femoral- popliteal arteries with no significant restenosis post angioplasty   Ankle/Brachial index:  1 16, normal   Prior:  1 09 PVR/ PPG tracings are normal  Metatarsal pressure of 162 mm Hg Great toe pressure of 142 mm Hg, within the healing range for a diabetic  Pedal Acceleration time: Dorsal:  80 ms     Plantar: 60 ms  Compared to previous study on 08/18/2020, there is no significant interval change  SIGNATURE: Electronically Signed by: Gustavo Melo on 2021-03-15 05:09:46 PM    I reviewed the above laboratory and imaging data  Discussion/Summary:   A 64-year-old female with a clinical [id number] melanoma  We had a long discussion regarding treatment options  She has no signs or symptoms of metastasis  She should not require any staging workup  Her tumor is thin, but margins are involved  We will have her pathology reviewed  I discussed that with a 0 4 mm melanoma and negative margins, the risk of having a positive sentinel node is under 3%  This risk would increase if the melanoma was 0 75-0 8 mm  We discussed the reasoning and rationale behind sentinel lymph node biopsy  We also discussed that if wide excision alone was performed, there is a small risk of finding a deeper residual tumor that would mandate sentinel node biopsy  I also discussed that in this scenario, more nodes would likely be removed after wide excision necessitating delayed sentinel node biopsy  After some discussion we have elected on wide excision in the office only  The risks were explained including bleeding, infection, recurrence, need for further surgery, wound complications  Informed consent was obtained  We will schedule this at our earliest mutual convenience  She is agreeable to this  All her questions were answered

## 2021-04-01 ENCOUNTER — OFFICE VISIT (OUTPATIENT)
Dept: VASCULAR SURGERY | Facility: CLINIC | Age: 76
End: 2021-04-01
Payer: COMMERCIAL

## 2021-04-01 VITALS
HEART RATE: 77 BPM | SYSTOLIC BLOOD PRESSURE: 130 MMHG | WEIGHT: 205 LBS | DIASTOLIC BLOOD PRESSURE: 88 MMHG | HEIGHT: 69 IN | BODY MASS INDEX: 30.36 KG/M2

## 2021-04-01 DIAGNOSIS — I70.211 ATHEROSCLEROSIS OF NATIVE ARTERY OF RIGHT LOWER EXTREMITY WITH INTERMITTENT CLAUDICATION (HCC): Primary | Chronic | ICD-10-CM

## 2021-04-01 PROCEDURE — 99214 OFFICE O/P EST MOD 30 MIN: CPT | Performed by: SURGERY

## 2021-04-01 NOTE — PROGRESS NOTES
Assessment/Plan:    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)   Atherosclerotic occlusive disease with non limiting right lower extremity claudication  We discussed the findings on recent duplex which showed no significant change since previous study  We discussed the indications for treatment and the conservative modes of therapy  We will continue conservative management and annual duplex follow-up  She will continue her current antiplatelet and statin therapy and we discussed the importance of a daily walking program        Diagnoses and all orders for this visit:    Atherosclerosis of native artery of right lower extremity with intermittent claudication ([address] )    Other orders  -     ciclopirox (LOPROX) 0 77 % cream; APPLY TWICE A DAY TO AFFECTED AREA IN THE GROIN FOR 3 WEEKS          Subjective:      Patient ID: Santo Rosas is a 66 y o  female  Patient presents today for review of VERN done 3/15/21  Patient has claudication in RLE  Patient states she can walk 100 yards and has to stop to rest 1-2 minutes before walking again  She states she has pain at night in right leg  She denies pain with elevation  43-year-old with known peripheral arterial occlusive disease presents in follow-up after recent duplex evaluation  She continues to complain of right calf claudication at approximately 100 yd  This then resolves with a short period of rest   She also notes some cramping in the right calf at night on a periodic basis  This does not follow any particular pattern  She notes no symptoms in the left leg  She notes no significant change since her previous evaluation  Of note she has been diagnosed with melanoma and is scheduled for surgery in the near future  Arterial duplex 03/15/2021  On the right there is a greater than 75% stenosis in the proximal superficial femoral artery with evidence of a segmental occlusion    Ankle-brachial index is 0 54 and toe pressure 110  On the left there is no focal stenosis with ankle-brachial index of 1 1 toe pressure 153  This is unchanged from previous study 03/15/2021      The following portions of the patient's history were reviewed and updated as appropriate: allergies, current medications, past family history, past medical history, past social history, past surgical history and problem list     I have reviewed and made appropriate changes to the review of systems input by the medical assistant      Vitals:    04/01/21 1133   BP: 130/88   BP Location: Right arm   Patient Position: Sitting   Pulse: 77   Weight: 93 kg (205 lb)   Height: 5' 9" (1 753 m)       Patient Active Problem List   Diagnosis    Toe cyanosis    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)    Onychomycosis    Essential hypertension    Melanoma ([facility] [address] )       Past Surgical History:   Procedure Laterality Date    HYSTERECTOMY      IR AORTAGRAM WITH RUN-OFF  11/15/2018    MASTECTOMY      REPLACEMENT TOTAL KNEE Left        Family History   Problem Relation Age of Onset    Diabetes Mother     Hypertension Mother     Atrial fibrillation Father        Social History     Socioeconomic History    Marital status: /Civil Union     Spouse name: Not on file    Number of children: Not on file    Years of education: Not on file    Highest education level: Not on file   Occupational History    Not on file   Social Needs    Financial resource strain: Not on file    Food insecurity     Worry: Not on file     Inability: Not on file   [employer] needs     Medical: Not on file     Non-medical: Not on file   Tobacco Use    Smoking status: Never Smoker    Smokeless tobacco: Never Used   Substance and Sexual Activity    Alcohol use: No    Drug use: No    Sexual activity: Not on file   Lifestyle    Physical activity     Days per week: Not on file     Minutes per session: Not on file    Stress: Not on file   Relationships  Social connections     Talks on phone: Not on file     Gets together: Not on file     Attends [employer] service: Not on file     Active member of club or organization: Not on file     Attends meetings of clubs or organizations: Not on file     Relationship status: Not on file    Intimate partner violence     Fear of current or ex partner: Not on file     Emotionally abused: Not on file     Physically abused: Not on file     Forced sexual activity: Not on file   Other Topics Concern    Not on file   Social History Narrative    Not on file       Allergies   Allergen Reactions    Amlodipine GI Intolerance    Atenolol GI Intolerance    Clonidine Other (See Comments)     Patient reports she did not tolerate, unspecified side effects      Conjugated Estrogens Hypertension    Felodipine GI Intolerance and Dizziness     Per pt    Furosemide GI Intolerance    Hydrochlorothiazide W-Triamterene GI Intolerance    Labetalol GI Intolerance and Dizziness     Per pt      Losartan      Headache, nausea    Meperidine     Metoprolol GI Intolerance    Morphine And Related      hallucinations  hallucinations    Neosporin [Neomycin-Bacitracin Zn-Polymyx]      rash  rash    Spironolactone Other (See Comments)     optical migraine    Sudafed [Pseudoephedrine]     Colloidal Oatmeal Rash         Current Outpatient Medications:     acetaminophen (TYLENOL) 325 mg tablet, Take 650 mg by mouth every 6 (six) hours as needed for mild pain, Disp: , Rfl:     ALPRAZolam (XANAX) 0 25 mg tablet, Take 0 25 mg by mouth as needed , Disp: , Rfl:     aspirin (ECOTRIN LOW STRENGTH) 81 mg EC tablet, Take 81 mg by mouth daily, Disp: , Rfl:     ciclopirox (LOPROX) 0 77 % cream, APPLY TWICE A DAY TO AFFECTED AREA IN THE GROIN FOR 3 WEEKS, Disp: , Rfl:     diphenhydrAMINE (BENADRYL) 25 mg tablet, Take 25 mg by mouth 2 (two) times a day, Disp: , Rfl:     glimepiride (AMARYL) 4 mg tablet, Take 4 mg by mouth daily with breakfast , Disp: , Rfl:     lovastatin (MEVACOR) 40 MG tablet, , Disp: , Rfl:     quinapril (ACCUPRIL) 40 MG tablet, Take 40 mg by mouth 2 (two) times a day  , Disp: , Rfl:     ibuprofen (MOTRIN) 200 mg tablet, Take by mouth every 6 (six) hours as needed for mild pain, Disp: , Rfl:        Review of Systems   Constitutional: Negative  HENT: Negative  Eyes: Negative  Respiratory: Negative  Cardiovascular: Negative  Gastrointestinal: Negative  Endocrine: Negative  Genitourinary: Negative  Musculoskeletal: Negative  Skin: Positive for rash  Allergic/Immunologic: Negative  Neurological: Negative  Hematological: Negative  Psychiatric/Behavioral: Negative  Objective:      /88 (BP Location: Right arm, Patient Position: Sitting)   Pulse 77   Ht 5' 9" (1 753 m)   Wt 93 kg (205 lb)   BMI 30 27 kg/m²          Physical Exam  Constitutional:       Appearance: Normal appearance  She is well-developed  HENT:      Head: Normocephalic and atraumatic  Eyes:      Conjunctiva/sclera: Conjunctivae normal    Neck:      Musculoskeletal: Normal range of motion and neck supple  Vascular: No carotid bruit or JVD  Cardiovascular:      Rate and Rhythm: Normal rate and regular rhythm  Pulses:           Carotid pulses are 2+ on the right side and 2+ on the left side  Radial pulses are 2+ on the right side and 2+ on the left side  Femoral pulses are 1+ on the right side and 1+ on the left side  Popliteal pulses are 0 on the right side and 2+ on the left side  Dorsalis pedis pulses are 0 on the right side and 2+ on the left side  Posterior tibial pulses are 0 on the right side and 2+ on the left side  Heart sounds: Normal heart sounds, S1 normal and S2 normal  No murmur  Pulmonary:      Effort: Pulmonary effort is normal       Breath sounds: Normal breath sounds  Abdominal:      General: There is no abdominal bruit  Palpations: Abdomen is soft  There is no pulsatile mass  Tenderness: There is no abdominal tenderness  Hernia: No hernia is present  Musculoskeletal: Normal range of motion  General: No tenderness or deformity  Skin:     General: Skin is warm and dry  Coloration: Skin is not pale  Neurological:      Mental Status: She is alert and oriented to person, place, and time  Sensory: No sensory deficit     Psychiatric:         Speech: Speech normal          Behavior: Behavior normal

## 2021-04-01 NOTE — LETTER
April 1, 2021     [address], [address] [place] [id number]    Patient: Masha Morgan   YOB: 1945   Date of Visit: 4/1/2021       Dear Dr Chen Batch:    Thank you for referring Masha Morgan to me for evaluation  Below are the relevant portions of my assessment and plan of care  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)   Atherosclerotic occlusive disease with non limiting right lower extremity claudication  We discussed the findings on recent duplex which showed no significant change since previous study  We discussed the indications for treatment and the conservative modes of therapy  We will continue conservative management and annual duplex follow-up  She will continue her current antiplatelet and statin therapy and we discussed the importance of a daily walking program     If you have questions, please do not hesitate to call me  I look forward to following Nay Addison along with you           Sincerely,        Chloe Theodore MD        CC: No Recipients

## 2021-04-01 NOTE — PATIENT INSTRUCTIONS
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)   Atherosclerotic occlusive disease with non limiting right lower extremity claudication  We discussed the findings on recent duplex which showed no significant change since previous study  We discussed the indications for treatment and the conservative modes of therapy  We will continue conservative management and annual duplex follow-up      She will continue her current antiplatelet and statin therapy and we discussed the importance of a daily walking program

## 2021-04-01 NOTE — ASSESSMENT & PLAN NOTE
Atherosclerotic occlusive disease with non limiting right lower extremity claudication  We discussed the findings on recent duplex which showed no significant change since previous study  We discussed the indications for treatment and the conservative modes of therapy  We will continue conservative management and annual duplex follow-up      She will continue her current antiplatelet and statin therapy and we discussed the importance of a daily walking program

## 2021-04-20 ENCOUNTER — PROCEDURE VISIT (OUTPATIENT)
Dept: SURGICAL ONCOLOGY | Facility: CLINIC | Age: 76
End: 2021-04-20
Payer: COMMERCIAL

## 2021-04-20 VITALS
WEIGHT: 202 LBS | HEART RATE: 114 BPM | SYSTOLIC BLOOD PRESSURE: 140 MMHG | BODY MASS INDEX: 29.92 KG/M2 | HEIGHT: 69 IN | RESPIRATION RATE: 16 BRPM | DIASTOLIC BLOOD PRESSURE: 96 MMHG | TEMPERATURE: 98.2 F

## 2021-04-20 DIAGNOSIS — C43.59 MALIGNANT MELANOMA OF TORSO EXCLUDING BREAST (HCC): Primary | ICD-10-CM

## 2021-04-20 PROCEDURE — 12032 INTMD RPR S/A/T/EXT 2.6-7.5: CPT | Performed by: SURGERY

## 2021-04-20 PROCEDURE — 88305 TISSUE EXAM BY PATHOLOGIST: CPT | Performed by: PATHOLOGY

## 2021-04-20 PROCEDURE — 11603 EXC TR-EXT MAL+MARG 2.1-3 CM: CPT | Performed by: SURGERY

## 2021-04-20 NOTE — PROGRESS NOTES
Surgical Oncology Follow Up       [address] [facility] [place]  [address] [facility] [address] [address] [phone]    Alice Santillan  1945  [id number]  [address] [facility] [place]  [address] [facility] [address] [address] [phone]    Diagnoses and all orders for this visit:    Malignant melanoma of torso excluding breast ([address] )        No chief complaint on file  No follow-ups on file  Oncology History   Melanoma ([facility] [address] )   3/18/2021 Initial Diagnosis    Melanoma ([facility] [address] )     3/18/2021 Biopsy    Left upper back shave biopsy:  Malignant melanoma   - at least 0 4 mm thickness   - 0 mitosis   - negative ulceration         Staging: [id number]  Melanoma of the left upper back, April 2021  Treatment history: Wide excision left upper back melanoma, April 2021  Current treatment:  observation  Disease status: ANDRA    History of Present Illness:  Patient returns for wide excision  Review of Systems  Complete ROS Surg Onc:   Complete ROS Surg Onc:   Constitutional: The patient denies new or recent history of general fatigue, no recent weight loss, no change in appetite  Eyes: No complaints of visual problems, no scleral icterus  ENT: no complaints of ear pain, no hoarseness, no difficulty swallowing,  no tinnitus and no new masses in head, oral cavity, or neck  Cardiovascular: No complaints of chest pain, no palpitations, no ankle edema  Respiratory: No complaints of shortness of breath, no cough  Gastrointestinal: No complaints of jaundice, no bloody stools, no pale stools  Genitourinary: No complaints of dysuria, no hematuria, no nocturia, no frequent urination, no urethral discharge  Musculoskeletal: No complaints of weakness, paralysis, joint stiffness or arthralgias  Integumentary: No complaints of rash, no new lesions     Neurological: No complaints of convulsions, no seizures, no dizziness  Hematologic/Lymphatic: No complaints of easy bruising  Endocrine:  No hot or cold intolerance  No polydipsia, polyphagia, or polyuria  Allergy/immunology:  No environmental allergies  No food allergies  Not immunocompromised  Skin:  No pallor or rash  No wound          Patient Active Problem List   Diagnosis    Toe cyanosis    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)    Onychomycosis    Essential hypertension    Melanoma ([facility] [address] )     Past Medical History:   Diagnosis Date    HTN (hypertension)     Melanoma of face ([facility] [address] )      Past Surgical History:   Procedure Laterality Date    HYSTERECTOMY      IR AORTAGRAM WITH RUN-OFF  11/15/2018    MASTECTOMY      REPLACEMENT TOTAL KNEE Left      Family History   Problem Relation Age of Onset    Diabetes Mother     Hypertension Mother     Atrial fibrillation Father      Social History     Socioeconomic History    Marital status: /Civil Union     Spouse name: Not on file    Number of children: Not on file    Years of education: Not on file    Highest education level: Not on file   Occupational History    Not on file   Social Needs    Financial resource strain: Not on file    Food insecurity     Worry: Not on file     Inability: Not on file   [employer] needs     Medical: Not on file     Non-medical: Not on file   Tobacco Use    Smoking status: Never Smoker    Smokeless tobacco: Never Used   Substance and Sexual Activity    Alcohol use: No    Drug use: No    Sexual activity: Not on file   Lifestyle    Physical activity     Days per week: Not on file     Minutes per session: Not on file    Stress: Not on file   Relationships    Social connections     Talks on phone: Not on file     Gets together: Not on file     Attends Congregational service: Not on file     Active member of club or organization: Not on file     Attends meetings of clubs or organizations: Not on file Relationship status: Not on file    Intimate partner violence     Fear of current or ex partner: Not on file     Emotionally abused: Not on file     Physically abused: Not on file     Forced sexual activity: Not on file   Other Topics Concern    Not on file   Social History Narrative    Not on file       Current Outpatient Medications:     acetaminophen (TYLENOL) 325 mg tablet, Take 650 mg by mouth every 6 (six) hours as needed for mild pain, Disp: , Rfl:     ALPRAZolam (XANAX) 0 25 mg tablet, Take 0 25 mg by mouth as needed , Disp: , Rfl:     aspirin (ECOTRIN LOW STRENGTH) 81 mg EC tablet, Take 81 mg by mouth daily, Disp: , Rfl:     ciclopirox (LOPROX) 0 77 % cream, APPLY TWICE A DAY TO AFFECTED AREA IN THE GROIN FOR 3 WEEKS, Disp: , Rfl:     diphenhydrAMINE (BENADRYL) 25 mg tablet, Take 25 mg by mouth 2 (two) times a day, Disp: , Rfl:     glimepiride (AMARYL) 4 mg tablet, Take 4 mg by mouth daily with breakfast , Disp: , Rfl:     ibuprofen (MOTRIN) 200 mg tablet, Take by mouth every 6 (six) hours as needed for mild pain, Disp: , Rfl:     lovastatin (MEVACOR) 40 MG tablet, , Disp: , Rfl:     quinapril (ACCUPRIL) 40 MG tablet, Take 40 mg by mouth 2 (two) times a day  , Disp: , Rfl:   Allergies   Allergen Reactions    Amlodipine GI Intolerance    Atenolol GI Intolerance    Clonidine Other (See Comments)     Patient reports she did not tolerate, unspecified side effects      Conjugated Estrogens Hypertension    Felodipine GI Intolerance and Dizziness     Per pt    Furosemide GI Intolerance    Hydrochlorothiazide W-Triamterene GI Intolerance    Labetalol GI Intolerance and Dizziness     Per pt      Losartan      Headache, nausea    Meperidine     Metoprolol GI Intolerance    Morphine And Related      hallucinations  hallucinations    Neosporin [Neomycin-Bacitracin Zn-Polymyx]      rash  rash    Spironolactone Other (See Comments)     optical migraine    Sudafed [Pseudoephedrine]     Colloidal Oatmeal Rash     Vitals:    04/20/21 1436   BP: 140/96   Pulse: (!) 114   Resp: 16   Temp: 98 2 °F (36 8 °C)       Physical Exam  Constitutional: General appearance: The Patient is well-developed and well-nourished who appears the stated age in no acute distress  Patient is pleasant and talkative  HEENT:  Normocephalic  Sclerae are anicteric  Mucous membranes are moist      Extremities: There is no clubbing or cyanosis  There is no edema  Symmetric  Neuro: Grossly nonfocal  Gait is normal      Skin: Warm, anicteric  Psych:  Patient is pleasant and talkative  Breasts:        Pathology:  [unfilled]    Labs:      Imaging  No results found  I reviewed the above laboratory and imaging data  Procedure:    Lesion measured 1 x 1 cm   wide excision measured 3 x 6 cm     under sterile conditions and local anesthesia, 1 cm margins were mapped out circumferentially around the lesion  An elliptical incision measuring 3 x 6 cm was made  Using sharp dissection this was taken through the skin, subcutaneous tissue and down to the underlying muscle fascia  This was excised off of the muscle fascia  Specimen was oriented  Wound was copiously irrigated  There was excellent hemostasis  Incision was approximated with interrupted 3 0 Vicryl sutures and staples  Sterile dressings were applied  She tolerated this well  Discussion/Summary:   57-year-old female status post wide excision of a [id number] melanoma  She tolerated wide excision well  She was instructed on signs and symptoms of infection  I will see her again in 2 weeks for a wound check and to discuss the pathology  Of her questions were answered

## 2021-05-04 ENCOUNTER — LAB REQUISITION (OUTPATIENT)
Dept: LAB | Facility: HOSPITAL | Age: 76
End: 2021-05-04
Payer: COMMERCIAL

## 2021-05-04 DIAGNOSIS — B95.8 UNSPECIFIED STAPHYLOCOCCUS AS THE CAUSE OF DISEASES CLASSIFIED ELSEWHERE: ICD-10-CM

## 2021-05-04 PROCEDURE — 87186 SC STD MICRODIL/AGAR DIL: CPT | Performed by: PHYSICIAN ASSISTANT

## 2021-05-04 PROCEDURE — 87205 SMEAR GRAM STAIN: CPT | Performed by: PHYSICIAN ASSISTANT

## 2021-05-04 PROCEDURE — 87077 CULTURE AEROBIC IDENTIFY: CPT | Performed by: PHYSICIAN ASSISTANT

## 2021-05-04 PROCEDURE — 87070 CULTURE OTHR SPECIMN AEROBIC: CPT | Performed by: PHYSICIAN ASSISTANT

## 2021-05-07 ENCOUNTER — OFFICE VISIT (OUTPATIENT)
Dept: SURGICAL ONCOLOGY | Facility: CLINIC | Age: 76
End: 2021-05-07
Payer: COMMERCIAL

## 2021-05-07 VITALS
RESPIRATION RATE: 17 BRPM | DIASTOLIC BLOOD PRESSURE: 94 MMHG | WEIGHT: 202 LBS | HEART RATE: 88 BPM | BODY MASS INDEX: 29.92 KG/M2 | TEMPERATURE: 97.8 F | SYSTOLIC BLOOD PRESSURE: 140 MMHG | HEIGHT: 69 IN

## 2021-05-07 DIAGNOSIS — C43.59 MALIGNANT MELANOMA OF TORSO EXCLUDING BREAST (HCC): Primary | ICD-10-CM

## 2021-05-07 LAB
BACTERIA WND AEROBE CULT: ABNORMAL
GRAM STN SPEC: ABNORMAL
GRAM STN SPEC: ABNORMAL

## 2021-05-07 PROCEDURE — 99212 OFFICE O/P EST SF 10 MIN: CPT | Performed by: SURGERY

## 2021-05-07 RX ORDER — CEPHALEXIN 500 MG/1
CAPSULE ORAL
COMMUNITY
Start: 2021-05-04 | End: 2022-01-13 | Stop reason: ALTCHOICE

## 2021-05-07 NOTE — LETTER
May 7, 2021     Denise Kramer, [address] [place] [address]    Patient: Day Edmond   YOB: 1945   Date of Visit: 5/7/2021       Dear Dr Perla Marquez:    Thank you for referring Day Edmond to me for evaluation  Below are my notes for this consultation  If you have questions, please do not hesitate to call me  I look forward to following your patient along with you  Sincerely,        Georgie Hashimoto, MD        CC: Charolett Kras, PA-C Georgie Hashimoto, MD  5/7/2021 12:17 PM  Sign when Signing Visit               Surgical Oncology Follow Up       [facility] [address] [id number] [phone]    Day Edmond  1945  [id number]  [facility] [address] [id number] [phone]    Diagnoses and all orders for this visit:    Malignant melanoma of torso excluding breast ([address] )    Other orders  -     cephalexin (KEFLEX) 500 mg capsule        Chief Complaint   Patient presents with    Follow-up       Return in about 1 year (around 5/7/2022) for Office Visit  Oncology History   Melanoma ([address] )   3/18/2021 Initial Diagnosis    Melanoma ([address] )     3/18/2021 Biopsy    Left upper back shave biopsy:  Malignant melanoma   - at least 0 4 mm thickness   - 0 mitosis   - negative ulceration     4/20/2021 Surgery    In office wide excision  A  Skin, left upper back, excision:  - Residual melanoma in-situ; prior biopsy site reaction   - Atypical lentiginous compound nevus  - Inked margins with no tumor seen         Staging: [id number]  Melanoma of the left upper back, April 2021  Treatment history: Wide excision left upper back melanoma, April 2021  Current treatment:  observation  Disease status: ANDRA  History of Present Illness:  Patient returns after her wide excision    She is having significant discomfort in the arm that she describes as shooting pain and has limited range of motion  She was started on antibiotics by her dermatologist for a possible infection  No fevers or chills  Review of Systems  Complete ROS Surg Onc:   Complete ROS Surg Onc:   Constitutional: The patient denies new or recent history of general fatigue, no recent weight loss, no change in appetite  Eyes: No complaints of visual problems, no scleral icterus  ENT: no complaints of ear pain, no hoarseness, no difficulty swallowing,  no tinnitus and no new masses in head, oral cavity, or neck  Cardiovascular: No complaints of chest pain, no palpitations, no ankle edema  Respiratory: No complaints of shortness of breath, no cough  Gastrointestinal: No complaints of jaundice, no bloody stools, no pale stools  Genitourinary: No complaints of dysuria, no hematuria, no nocturia, no frequent urination, no urethral discharge  Musculoskeletal: No complaints of weakness, paralysis, joint stiffness or arthralgias  Integumentary: No complaints of rash, no new lesions  Neurological: No complaints of convulsions, no seizures, no dizziness  Hematologic/Lymphatic: No complaints of easy bruising  Endocrine:  No hot or cold intolerance  No polydipsia, polyphagia, or polyuria  Allergy/immunology:  No environmental allergies  No food allergies  Not immunocompromised  Skin:  No pallor or rash  Surgical wound          Patient Active Problem List   Diagnosis    Toe cyanosis    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)    Onychomycosis    Essential hypertension    Melanoma ([address] )     Past Medical History:   Diagnosis Date    HTN (hypertension)     Melanoma of face ([address] )      Past Surgical History:   Procedure Laterality Date    HYSTERECTOMY      IR AORTAGRAM WITH RUN-OFF  11/15/2018    MASTECTOMY      REPLACEMENT TOTAL KNEE Left      Family History   Problem Relation Age of Onset    Diabetes Mother    Wash [employer]er Hypertension Mother     Atrial fibrillation Father      Social History     Socioeconomic History    Marital status: /Civil Union     Spouse name: Not on file    Number of children: Not on file    Years of education: Not on file    Highest education level: Not on file   Occupational History    Not on file   Social Needs    Financial resource strain: Not on file    Food insecurity     Worry: Not on file     Inability: Not on file    Transportation needs     Medical: Not on file     Non-medical: Not on file   Tobacco Use    Smoking status: Never Smoker    Smokeless tobacco: Never Used   Substance and Sexual Activity    Alcohol use: No    Drug use: No    Sexual activity: Not on file   Lifestyle    Physical activity     Days per week: Not on file     Minutes per session: Not on file    Stress: Not on file   Relationships    Social connections     Talks on phone: Not on file     Gets together: Not on file     Attends [employer] service: Not on file     Active member of club or organization: Not on file     Attends meetings of clubs or organizations: Not on file     Relationship status: Not on file    Intimate partner violence     Fear of current or ex partner: Not on file     Emotionally abused: Not on file     Physically abused: Not on file     Forced sexual activity: Not on file   Other Topics Concern    Not on file   Social History Narrative    Not on file       Current Outpatient Medications:     acetaminophen (TYLENOL) 325 mg tablet, Take 650 mg by mouth every 6 (six) hours as needed for mild pain, Disp: , Rfl:     ALPRAZolam (XANAX) 0 25 mg tablet, Take 0 25 mg by mouth as needed , Disp: , Rfl:     aspirin (ECOTRIN LOW STRENGTH) 81 mg EC tablet, Take 81 mg by mouth daily, Disp: , Rfl:     cephalexin (KEFLEX) 500 mg capsule, , Disp: , Rfl:     ciclopirox (LOPROX) 0 77 % cream, APPLY TWICE A DAY TO AFFECTED AREA IN THE GROIN FOR 3 WEEKS, Disp: , Rfl:     diphenhydrAMINE (BENADRYL) 25 mg tablet, Take 25 mg by mouth 2 (two) times a day, Disp: , Rfl:     glimepiride (AMARYL) 4 mg tablet, Take 4 mg by mouth daily with breakfast , Disp: , Rfl:     ibuprofen (MOTRIN) 200 mg tablet, Take by mouth every 6 (six) hours as needed for mild pain, Disp: , Rfl:     lovastatin (MEVACOR) 40 MG tablet, , Disp: , Rfl:     quinapril (ACCUPRIL) 40 MG tablet, Take 40 mg by mouth 2 (two) times a day  , Disp: , Rfl:   Allergies   Allergen Reactions    Amlodipine GI Intolerance    Atenolol GI Intolerance    Clonidine Other (See Comments)     Patient reports she did not tolerate, unspecified side effects      Conjugated Estrogens Hypertension    Felodipine GI Intolerance and Dizziness     Per pt    Furosemide GI Intolerance    Hydrochlorothiazide W-Triamterene GI Intolerance    Labetalol GI Intolerance and Dizziness     Per pt      Losartan      Headache, nausea    Meperidine     Metoprolol GI Intolerance    Morphine And Related      hallucinations  hallucinations    Neosporin [Neomycin-Bacitracin Zn-Polymyx]      rash  rash    Spironolactone Other (See Comments)     optical migraine    Sudafed [Pseudoephedrine]     Colloidal Oatmeal Rash     Vitals:    05/07/21 1148   BP: 140/94   Pulse: 88   Resp: 17   Temp: 97 8 °F (36 6 °C)       Physical Exam  Constitutional: General appearance: The Patient is well-developed and well-nourished who appears the stated age in no acute distress  Patient is pleasant and talkative  HEENT:  Normocephalic  Sclerae are anicteric  Mucous membranes are moist     Extremities: There is no clubbing or cyanosis  There is no edema  Symmetric  Neuro: Grossly nonfocal  Gait is normal      Skin: Warm, anicteric  Wide excision site has healed well  No erythema, drainage or tenderness  Psych:  Patient is pleasant and talkative  Breasts:        Pathology:  Final Diagnosis   A  Skin, left upper back, excision:  - Residual melanoma in-situ; prior biopsy site reaction    - Atypical lentiginous compound nevus  - Inked margins with no tumor seen           Interpretation performed at [facility], [facility], [address]       Electronically signed by Josie Fabian MD on 4/28/2021 at  1:03 PM         Labs:      Imaging  No results found  I reviewed the above laboratory and imaging data  Discussion/Summary:  67-year-old female status post wide excision of a [id number] melanoma  She is doing well  The staples were removed  I told her to see if this improves her pain and range of motion  If it does not, we will start her on gabapentin as this may be neuropathic pain  We also discussed physical therapy  She will complete 1 week's worth of antibiotics, but at this time her wound does not look infected at all  I will see her again in 1 year for another clinical exam   I discussed if she has any new, persistent, or unexplained symptoms to contact us and directed imaging may be performed  If her discomfort does not significantly improve she will contact us and we see her sooner  All of her and her 's questions were answered

## 2021-05-07 NOTE — PROGRESS NOTES
Surgical Oncology Follow Up       [facility] [facility] [place]  [address] [facility] [address] [address]  [phone]    Gabrielbronsonkamcolten ReillyRalph  1945  [id number]  [address] [facility]  [address] [facility] [address] [id number] [phone]    Diagnoses and all orders for this visit:    Malignant melanoma of torso excluding breast ([facility] )    Other orders  -     cephalexin (KEFLEX) 500 mg capsule        Chief Complaint   Patient presents with    Follow-up       Return in about 1 year (around 5/7/2022) for Office Visit  Oncology History   Melanoma ([facility] )   3/18/2021 Initial Diagnosis    Melanoma ([facility] )     3/18/2021 Biopsy    Left upper back shave biopsy:  Malignant melanoma   - at least 0 4 mm thickness   - 0 mitosis   - negative ulceration     4/20/2021 Surgery    In office wide excision  A  Skin, left upper back, excision:  - Residual melanoma in-situ; prior biopsy site reaction   - Atypical lentiginous compound nevus  - Inked margins with no tumor seen         Staging: [id number]  Melanoma of the left upper back, April 2021  Treatment history: Wide excision left upper back melanoma, April 2021  Current treatment:  observation  Disease status: ANDRA  History of Present Illness:  Patient returns after her wide excision  She is having significant discomfort in the arm that she describes as shooting pain and has limited range of motion  She was started on antibiotics by her dermatologist for a possible infection  No fevers or chills  Review of Systems  Complete ROS Surg Onc:   Complete ROS Surg Onc:   Constitutional: The patient denies new or recent history of general fatigue, no recent weight loss, no change in appetite  Eyes: No complaints of visual problems, no scleral icterus     ENT: no complaints of ear pain, no hoarseness, no difficulty swallowing,  no tinnitus and no new masses in head, oral cavity, or neck  Cardiovascular: No complaints of chest pain, no palpitations, no ankle edema  Respiratory: No complaints of shortness of breath, no cough  Gastrointestinal: No complaints of jaundice, no bloody stools, no pale stools  Genitourinary: No complaints of dysuria, no hematuria, no nocturia, no frequent urination, no urethral discharge  Musculoskeletal: No complaints of weakness, paralysis, joint stiffness or arthralgias  Integumentary: No complaints of rash, no new lesions  Neurological: No complaints of convulsions, no seizures, no dizziness  Hematologic/Lymphatic: No complaints of easy bruising  Endocrine:  No hot or cold intolerance  No polydipsia, polyphagia, or polyuria  Allergy/immunology:  No environmental allergies  No food allergies  Not immunocompromised  Skin:  No pallor or rash  Surgical wound          Patient Active Problem List   Diagnosis    Toe cyanosis    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)    Onychomycosis    Essential hypertension    Melanoma ([facility] )     Past Medical History:   Diagnosis Date    HTN (hypertension)     Melanoma of face ([facility] )      Past Surgical History:   Procedure Laterality Date    HYSTERECTOMY      IR AORTAGRAM WITH RUN-OFF  11/15/2018    MASTECTOMY      REPLACEMENT TOTAL KNEE Left      Family History   Problem Relation Age of Onset    Diabetes Mother     Hypertension Mother     Atrial fibrillation Father      Social History     Socioeconomic History    Marital status: /Civil Union     Spouse name: Not on file    Number of children: Not on file    Years of education: Not on file    Highest education level: Not on file   Occupational History    Not on file   Social Needs    Financial resource strain: Not on file    Food insecurity     Worry: Not on file     Inability: Not on file    Transportation needs     Medical: Not on file     Non-medical: Not on file   Tobacco Use    Smoking status: Never Smoker    Smokeless tobacco: Never Used   Substance and Sexual Activity    Alcohol use: No    Drug use: No    Sexual activity: Not on file   Lifestyle    Physical activity     Days per week: Not on file     Minutes per session: Not on file    Stress: Not on file   Relationships    Social connections     Talks on phone: Not on file     Gets together: Not on file     Attends Mormon service: Not on file     Active member of club or organization: Not on file     Attends meetings of clubs or organizations: Not on file     Relationship status: Not on file    Intimate partner violence     Fear of current or ex partner: Not on file     Emotionally abused: Not on file     Physically abused: Not on file     Forced sexual activity: Not on file   Other Topics Concern    Not on file   Social History Narrative    Not on file       Current Outpatient Medications:     acetaminophen (TYLENOL) 325 mg tablet, Take 650 mg by mouth every 6 (six) hours as needed for mild pain, Disp: , Rfl:     ALPRAZolam (XANAX) 0 25 mg tablet, Take 0 25 mg by mouth as needed , Disp: , Rfl:     aspirin (ECOTRIN LOW STRENGTH) 81 mg EC tablet, Take 81 mg by mouth daily, Disp: , Rfl:     cephalexin (KEFLEX) 500 mg capsule, , Disp: , Rfl:     ciclopirox (LOPROX) 0 77 % cream, APPLY TWICE A DAY TO AFFECTED AREA IN THE GROIN FOR 3 WEEKS, Disp: , Rfl:     diphenhydrAMINE (BENADRYL) 25 mg tablet, Take 25 mg by mouth 2 (two) times a day, Disp: , Rfl:     glimepiride (AMARYL) 4 mg tablet, Take 4 mg by mouth daily with breakfast , Disp: , Rfl:     ibuprofen (MOTRIN) 200 mg tablet, Take by mouth every 6 (six) hours as needed for mild pain, Disp: , Rfl:     lovastatin (MEVACOR) 40 MG tablet, , Disp: , Rfl:     quinapril (ACCUPRIL) 40 MG tablet, Take 40 mg by mouth 2 (two) times a day  , Disp: , Rfl:   Allergies   Allergen Reactions    Amlodipine GI Intolerance    Atenolol GI Intolerance    Clonidine Other (See Comments) Patient reports she did not tolerate, unspecified side effects      Conjugated Estrogens Hypertension    Felodipine GI Intolerance and Dizziness     Per pt    Furosemide GI Intolerance    Hydrochlorothiazide W-Triamterene GI Intolerance    Labetalol GI Intolerance and Dizziness     Per pt      Losartan      Headache, nausea    Meperidine     Metoprolol GI Intolerance    Morphine And Related      hallucinations  hallucinations    Neosporin [Neomycin-Bacitracin Zn-Polymyx]      rash  rash    Spironolactone Other (See Comments)     optical migraine    Sudafed [Pseudoephedrine]     Colloidal Oatmeal Rash     Vitals:    05/07/21 1148   BP: 140/94   Pulse: 88   Resp: 17   Temp: 97 8 °F (36 6 °C)       Physical Exam  Constitutional: General appearance: The Patient is well-developed and well-nourished who appears the stated age in no acute distress  Patient is pleasant and talkative  HEENT:  Normocephalic  Sclerae are anicteric  Mucous membranes are moist     Extremities: There is no clubbing or cyanosis  There is no edema  Symmetric  Neuro: Grossly nonfocal  Gait is normal      Skin: Warm, anicteric  Wide excision site has healed well  No erythema, drainage or tenderness  Psych:  Patient is pleasant and talkative  Breasts:        Pathology:  Final Diagnosis   A  Skin, left upper back, excision:  - Residual melanoma in-situ; prior biopsy site reaction   - Atypical lentiginous compound nevus  - Inked margins with no tumor seen           Interpretation performed at [facility], [address] [place] [address]       Electronically signed by Rey Delarosa MD on 4/28/2021 at  1:03 PM         Labs:      Imaging  No results found  I reviewed the above laboratory and imaging data  Discussion/Summary:  78-year-old female status post wide excision of a [id number] melanoma  She is doing well  The staples were removed  I told her to see if this improves her pain and range of motion  If it does not, we will start her on gabapentin as this may be neuropathic pain  We also discussed physical therapy  She will complete 1 week's worth of antibiotics, but at this time her wound does not look infected at all  I will see her again in 1 year for another clinical exam   I discussed if she has any new, persistent, or unexplained symptoms to contact us and directed imaging may be performed  If her discomfort does not significantly improve she will contact us and we see her sooner  All of her and her 's questions were answered

## 2021-05-11 ENCOUNTER — TELEPHONE (OUTPATIENT)
Dept: SURGICAL ONCOLOGY | Facility: CLINIC | Age: 76
End: 2021-05-11

## 2021-06-02 ENCOUNTER — OFFICE VISIT (OUTPATIENT)
Dept: PODIATRY | Facility: CLINIC | Age: 76
End: 2021-06-02
Payer: COMMERCIAL

## 2021-06-02 VITALS
WEIGHT: 208 LBS | SYSTOLIC BLOOD PRESSURE: 218 MMHG | DIASTOLIC BLOOD PRESSURE: 96 MMHG | HEART RATE: 90 BPM | BODY MASS INDEX: 30.72 KG/M2

## 2021-06-02 DIAGNOSIS — I70.211 ATHEROSCLEROSIS OF NATIVE ARTERY OF RIGHT LOWER EXTREMITY WITH INTERMITTENT CLAUDICATION (HCC): Chronic | ICD-10-CM

## 2021-06-02 DIAGNOSIS — B35.1 ONYCHOMYCOSIS: Primary | ICD-10-CM

## 2021-06-02 PROCEDURE — 11721 DEBRIDE NAIL 6 OR MORE: CPT | Performed by: PODIATRIST

## 2021-06-02 NOTE — PATIENT INSTRUCTIONS
Raynaud Disease   WHAT YOU NEED TO KNOW:   Raynaud disease is a disorder that affects blood circulation, usually in the hands and feet  This disorder causes the arteries (blood vessels) that carry blood to your fingers, toes, ears, or nose to tighten  This is often triggered by cold or emotional stress  The decrease in blood flow causes lack of oxygen and changes in skin color  Over time, ulcers or gangrene (tissue death) may develop if frequent or severe attacks are not prevented  Raynaud disease can be primary or secondary  Primary means it has no clear cause  Secondary means it has a cause and may be related to another medical condition you have  DISCHARGE INSTRUCTIONS:   Follow up with your healthcare provider as directed:  Write down your questions so you remember to ask them during your visits  Skin care:   · Avoid putting too much pressure on your fingertips, such as typing or playing the piano  This kind of pressure may cause your blood vessels to narrow and trigger an attack  · Check your feet and hands daily for numb areas, thinning or thickening skin, black spots, cracks, brittle nails, or ulcers  · Keep your skin clean and dry to prevent an infection  Use lotion that contains lanolin on your hands and feet to keep the skin from drying or cracking  Prevent a Raynaud disease attack:   · Avoid cold temperatures when possible:  Wear gloves, scarves, or other winter garments during the winter months or before you go into cold rooms  · Limit alcohol and caffeine:  Men should limit alcohol to 2 drinks a day  Women should limit alcohol to 1 drink a day  A drink is 12 ounces of beer, 5 ounces of wine, or 1½ ounces of liquor  Try drinking decaffeinated coffee, tea, or soda  Ask your healthcare provider for more information about alcohol and caffeine  · Use caution with medicines:  Talk to your healthcare provider before you use medicines that may trigger an attack   These include certain medicines used for treating high blood pressure, headaches, cancer, or colds  · Exercise regularly: This prevents narrowing of the blood vessels and increases blood flow in your body  · Learn to manage stress:  Stress may trigger an attack  Try new ways to relax, such as deep breathing, meditation, or biofeedback  Biofeedback is a way to control how your body reacts to stress or pain  · Stop smoking:  If you smoke, it is never too late to quit  Smoking causes blood vessels to narrow and may trigger an attack  Ask your healthcare provider for information if you need help quitting  What to do during a Raynaud disease attack:   · Get inside to warm yourself  · Wiggle your fingers or toes, or swing your arms around to increase circulation  Massage the affected parts  · Place your hands under your armpits or run warm water over the affected area  Do not  place the affected part in direct contact with hot water or a hot water bottle  The affected parts could be injured if they are not able to feel that the water is hot  · Get yourself out of stressful situations if possible  Deep breathing, meditation, or biofeedback may help decrease stress  Contact your healthcare provider if:   · You have new symptoms since your last appointment  · Your symptoms prevent you from doing your daily activities  · You need help to quit smoking  · You have questions or concerns about your condition or care  Return to the emergency department if:   · You have many attacks even if you prevent cold, stress, or other triggers  · You have pain in your fingers or toes that does not go away or gets worse  · You have sores or ulcers on the tips of your fingers or toes that do not heal     · You have black spots on your fingers or toes  · Your hands or feet remain cold or discolored even after you warm them      © Copyright [address] Information is for End User's use only and may not be sold, redistributed or otherwise used for commercial purposes  All illustrations and images included in CareNotes® are the copyrighted property of A D A M , Inc  or Analia Marley  The above information is an  only  It is not intended as medical advice for individual conditions or treatments  Talk to your doctor, nurse or pharmacist before following any medical regimen to see if it is safe and effective for you

## 2021-06-02 NOTE — PROGRESS NOTES
Date Patient Seen: 6/2/21       Subjective:     Chief Complaint:  High risk footcare     Patient ID: Yecenia Archer is a 66 y o  female  Best Kurtzir is here for high risk foot care  She has a longstanding history of PAD  [facility]  Pt denies any numbness, tingling, or  coldness   Pt denies any open wounds  Pt denies Claudication or rest pain  Pt taking ASA 81 mg  and Lovastatin  She is 57-year-old female with HTN, HLD, PAD s/p L SFA angioplasty '18 ([facility]) who returns to the office for yearly visit to review LEAD 2/12/20  LEAD showed - R SFA >75% stenosis followed by short segment occlusion, R JOANA 0 56/100/76 and L diffuse disease without focal stenosis, L JOANA 1 17/108/21                      The following portions of the patient's history were reviewed and updated as appropriate: allergies, current medications, past family history, past medical history, past social history, past surgical history and problem list     Review of Systems   Constitutional: Negative  Respiratory: Negative  Cardiovascular: Negative  Gastrointestinal: Negative  Musculoskeletal: Positive for gait problem  She is getting some new calf cramps after walking one block Right  Hematological: Negative  Psychiatric/Behavioral: Negative  Objective:      BP (!) 218/96 Comment: did not take BP meds today, no HA or blurred vision  Pulse 90   Wt 94 3 kg (208 lb)   BMI 30 72 kg/m²        Physical Exam  Vitals signs and nursing note reviewed  Constitutional:       General: She is not in acute distress  Appearance: Normal appearance  She is not ill-appearing, toxic-appearing or diaphoretic  HENT:      Head: Normocephalic  Cardiovascular:      Rate and Rhythm: Normal rate  Pulses:           Dorsalis pedis pulses are 0 on the right side and 1+ on the left side  Posterior tibial pulses are 0 on the right side and 1+ on the left side     Pulmonary:      Effort: Pulmonary effort is normal    Abdominal: General: Bowel sounds are normal    Musculoskeletal:        Feet:    Feet:      Right foot:      Protective Sensation: 10 sites tested  10 sites sensed  Left foot:      Protective Sensation: 10 sites tested  10 sites sensed  Skin:     Comments: Nails yellow-brown, 5mm thick, dystrophic, with crumbling subugunal debris x 10  Neurological:      General: No focal deficit present  Mental Status: She is alert  Psychiatric:         Mood and Affect: Mood normal             Diagnoses and all orders for this visit:    Onychomycosis    Atherosclerosis of native artery of right lower extremity with intermittent claudication (HCC)         Assessment:  Onychomycosis   PAD  New claudication Right    Plan:  High risk  foot precautions reviewed with patient including the need to wear protective shoegear at all times when walking including in the home, the need to check feet all surfaces daily with a mirror and report and skin breaks to podiatry, the need to apply an emollient to skin of feet daily  Nails x 10 were debrided with double-action nail forceps of their thickness, length and width      I recommended she contact Dr Radhika Deras regarding the new RLE claudication ASAP

## 2021-08-05 ENCOUNTER — APPOINTMENT (OUTPATIENT)
Dept: LAB | Facility: MEDICAL CENTER | Age: 76
End: 2021-08-05
Payer: COMMERCIAL

## 2021-08-05 DIAGNOSIS — E78.5 HYPERLIPIDEMIA, UNSPECIFIED HYPERLIPIDEMIA TYPE: ICD-10-CM

## 2021-08-05 DIAGNOSIS — I10 ESSENTIAL HYPERTENSION, BENIGN: ICD-10-CM

## 2021-08-05 DIAGNOSIS — E11.9 TYPE 2 DIABETES MELLITUS WITHOUT COMPLICATION, WITHOUT LONG-TERM CURRENT USE OF INSULIN (HCC): ICD-10-CM

## 2021-08-05 LAB
ALBUMIN SERPL BCP-MCNC: 3.5 G/DL (ref 3.5–5)
ALP SERPL-CCNC: 87 U/L (ref 46–116)
ALT SERPL W P-5'-P-CCNC: 45 U/L (ref 12–78)
ANION GAP SERPL CALCULATED.3IONS-SCNC: 8 MMOL/L (ref 4–13)
AST SERPL W P-5'-P-CCNC: 31 U/L (ref 5–45)
BASOPHILS # BLD AUTO: 0.05 THOUSANDS/ΜL (ref 0–0.1)
BASOPHILS NFR BLD AUTO: 1 % (ref 0–1)
BILIRUB SERPL-MCNC: 0.53 MG/DL (ref 0.2–1)
BUN SERPL-MCNC: 17 MG/DL (ref 5–25)
CALCIUM SERPL-MCNC: 9.4 MG/DL (ref 8.3–10.1)
CHLORIDE SERPL-SCNC: 106 MMOL/L (ref 100–108)
CHOLEST SERPL-MCNC: 161 MG/DL (ref 50–200)
CO2 SERPL-SCNC: 26 MMOL/L (ref 21–32)
CREAT SERPL-MCNC: 1.25 MG/DL (ref 0.6–1.3)
EOSINOPHIL # BLD AUTO: 0.24 THOUSAND/ΜL (ref 0–0.61)
EOSINOPHIL NFR BLD AUTO: 3 % (ref 0–6)
ERYTHROCYTE [DISTWIDTH] IN BLOOD BY AUTOMATED COUNT: 12.7 % (ref 11.6–15.1)
EST. AVERAGE GLUCOSE BLD GHB EST-MCNC: 197 MG/DL
GFR SERPL CREATININE-BSD FRML MDRD: 42 ML/MIN/1.73SQ M
GLUCOSE P FAST SERPL-MCNC: 201 MG/DL (ref 65–99)
HBA1C MFR BLD: 8.5 %
HCT VFR BLD AUTO: 48 % (ref 34.8–46.1)
HDLC SERPL-MCNC: 31 MG/DL
HGB BLD-MCNC: 15.5 G/DL (ref 11.5–15.4)
IMM GRANULOCYTES # BLD AUTO: 0.03 THOUSAND/UL (ref 0–0.2)
IMM GRANULOCYTES NFR BLD AUTO: 0 % (ref 0–2)
LDLC SERPL CALC-MCNC: 79 MG/DL (ref 0–100)
LYMPHOCYTES # BLD AUTO: 2.33 THOUSANDS/ΜL (ref 0.6–4.47)
LYMPHOCYTES NFR BLD AUTO: 29 % (ref 14–44)
MCH RBC QN AUTO: 30.8 PG (ref 26.8–34.3)
MCHC RBC AUTO-ENTMCNC: 32.3 G/DL (ref 31.4–37.4)
MCV RBC AUTO: 95 FL (ref 82–98)
MONOCYTES # BLD AUTO: 0.75 THOUSAND/ΜL (ref 0.17–1.22)
MONOCYTES NFR BLD AUTO: 9 % (ref 4–12)
NEUTROPHILS # BLD AUTO: 4.67 THOUSANDS/ΜL (ref 1.85–7.62)
NEUTS SEG NFR BLD AUTO: 58 % (ref 43–75)
NRBC BLD AUTO-RTO: 0 /100 WBCS
PLATELET # BLD AUTO: 261 THOUSANDS/UL (ref 149–390)
PMV BLD AUTO: 12.3 FL (ref 8.9–12.7)
POTASSIUM SERPL-SCNC: 4.4 MMOL/L (ref 3.5–5.3)
PROT SERPL-MCNC: 7 G/DL (ref 6.4–8.2)
RBC # BLD AUTO: 5.04 MILLION/UL (ref 3.81–5.12)
SODIUM SERPL-SCNC: 140 MMOL/L (ref 136–145)
TRIGL SERPL-MCNC: 257 MG/DL
WBC # BLD AUTO: 8.07 THOUSAND/UL (ref 4.31–10.16)

## 2021-08-05 PROCEDURE — 80053 COMPREHEN METABOLIC PANEL: CPT

## 2021-08-05 PROCEDURE — 36415 COLL VENOUS BLD VENIPUNCTURE: CPT

## 2021-08-05 PROCEDURE — 83036 HEMOGLOBIN GLYCOSYLATED A1C: CPT

## 2021-08-05 PROCEDURE — 80061 LIPID PANEL: CPT

## 2021-08-05 PROCEDURE — 85025 COMPLETE CBC W/AUTO DIFF WBC: CPT

## 2021-09-02 ENCOUNTER — OFFICE VISIT (OUTPATIENT)
Dept: PODIATRY | Facility: CLINIC | Age: 76
End: 2021-09-02
Payer: COMMERCIAL

## 2021-09-02 VITALS
HEART RATE: 80 BPM | WEIGHT: 204 LBS | BODY MASS INDEX: 30.21 KG/M2 | SYSTOLIC BLOOD PRESSURE: 194 MMHG | DIASTOLIC BLOOD PRESSURE: 81 MMHG | HEIGHT: 69 IN

## 2021-09-02 DIAGNOSIS — B35.1 ONYCHOMYCOSIS: Primary | ICD-10-CM

## 2021-09-02 DIAGNOSIS — I70.211 ATHEROSCLEROSIS OF NATIVE ARTERY OF RIGHT LOWER EXTREMITY WITH INTERMITTENT CLAUDICATION (HCC): Chronic | ICD-10-CM

## 2021-09-02 PROCEDURE — 11721 DEBRIDE NAIL 6 OR MORE: CPT | Performed by: PODIATRIST

## 2021-09-14 NOTE — PATIENT INSTRUCTIONS
Foot Care for People with Diabetes   WHAT YOU NEED TO KNOW:   · Foot care helps protect your feet and prevent foot ulcers or sores  Long-term high blood sugar levels can damage the blood vessels and nerves in your legs and feet  This damage makes it hard to feel pressure, pain, temperature, and touch  You may not be able to feel a cut or sore, or shoes that are too tight  Foot care is needed to prevent serious problems, such as an infection or amputation  · Diabetes may cause your toes to become crooked or curved under  These changes may affect the way you walk and can lead to increased pressure on your foot  The pressure can decrease blood flow to your feet  Lack of blood flow increases your risk for a foot ulcer  Do not ignore small problems, such as dry skin or small wounds  These can become life-threatening over time without proper care  DISCHARGE INSTRUCTIONS:   Call your care team provider if:   · Your feet become numb, weak, or hard to move  · You have pus draining from a sore on your foot  · You have a wound on your foot that gets bigger, deeper, or does not heal      · You see blisters, cuts, scratches, calluses, or sores on your foot  · You have a fever, and your feet become red, warm, and swollen  · Your toenails become thick, curled, or yellow  · You find it hard to check your feet because your vision is poor  · You have questions or concerns about your condition or care  Foot care:   · Check your feet each day  Look at your whole foot, including the bottom, and between and under your toes  Check for wounds, corns, and calluses  Use a mirror to see the bottom of your feet  The skin on your feet may be shiny, tight, or darker than normal  Your feet may also be cold and pale  Feel your feet by running your hands along the tops, bottoms, sides, and between your toes  Redness, swelling, and warmth are signs of blood flow problems that can lead to a foot ulcer   Do not try to remove corns or calluses yourself  · Wash your feet each day with soap and warm water  Do not use hot water, because this can injure your foot  Dry your feet gently with a towel after you wash them  Dry between and under your toes  · Apply lotion or a moisturizer on your dry feet  Ask your care team provider what lotions are best to use  Do not put lotion or moisturizer between your toes  Moisture between your toes could lead to skin breakdown  · Cut your toenails correctly  File or cut your toenails straight across  Use a soft brush to clean around your toenails  If your toenails are very thick, you may need to have a care team provider or specialist cut them  · Protect your feet  Do not walk barefoot or wear your shoes without socks  Check your shoes for rocks or other objects that can hurt your feet  Wear cotton socks to help keep your feet dry  Wear socks without toe seams, or wear them with the seams inside out  Change your socks each day  Do not wear socks that are dirty or damp  · Wear shoes that fit well  Wear shoes that do not rub against any area of your feet  Your shoes should be ½ to ¾ inch (1 to 2 centimeters) longer than your feet  Your shoes should also have extra space around the widest part of your feet  Walking or athletic shoes with laces or straps that adjust are best  Ask your care team provider for help to choose shoes that fit you best  Ask him or her if you need to wear an insert, orthotic, or bandage on your feet  · Do not smoke  Smoking can damage your blood vessels and put you at increased risk for foot ulcers  Ask your care team provider for information if you currently smoke and need help to quit  E-cigarettes or smokeless tobacco still contain nicotine  Talk to your care team provider before you use these products  Follow up with your diabetes care team provider or foot specialist as directed:   You will need to have your feet checked at least once a christianne  [place] may need a foot exam more often if you have nerve damage, foot deformities, or ulcers  Write down your questions so you remember to ask them during your visits  © Copyright [employer] 2021 Information is for End User's use only and may not be sold, redistributed or otherwise used for commercial purposes  All illustrations and images included in CareNotes® are the copyrighted property of [employer]  or Analia Marley  The above information is an  only  It is not intended as medical advice for individual conditions or treatments  Talk to your doctor, nurse or pharmacist before following any medical regimen to see if it is safe and effective for you

## 2021-09-14 NOTE — PROGRESS NOTES
Date Patient Seen: 9/2/21       Subjective:     Chief Complaint:  High risk footcare     Patient ID: Malika Payan is a 66 y o  female  Monroviaviola Herringters is here for high risk foot care  She has a longstanding history of PAD  [facility]  Pt denies any numbness, tingling, or  coldness   Pt denies any open wounds  Pt denies Claudication or rest pain  Pt taking ASA 81 mg  and Lovastatin  She is 70-year-old female with HTN, HLD, PAD s/p L SFA angioplasty '18 ([facility]) who returns to the office for yearly visit to review LEAD 2/12/20  LEAD showed - R SFA >75% stenosis followed by short segment occlusion, R JOANA 0 56/100/76 and L diffuse disease without focal stenosis, L JOANA 1 17/108/21                      The following portions of the patient's history were reviewed and updated as appropriate: allergies, current medications, past family history, past medical history, past social history, past surgical history and problem list     Review of Systems   Constitutional: Negative  Respiratory: Negative  Cardiovascular: Negative  Gastrointestinal: Negative  Musculoskeletal: Positive for gait problem  She is getting some new calf cramps after walking one block Right  Hematological: Negative  Psychiatric/Behavioral: Negative  Objective:      BP (!) 194/81   Pulse 80   Ht 5' 9" (1 753 m) Comment: verbal  Wt 92 5 kg (204 lb)   BMI 30 13 kg/m²        Physical Exam  Vitals and nursing note reviewed  Constitutional:       General: She is not in acute distress  Appearance: Normal appearance  She is not ill-appearing, toxic-appearing or diaphoretic  HENT:      Head: Normocephalic  Cardiovascular:      Rate and Rhythm: Normal rate  Pulses:           Dorsalis pedis pulses are 0 on the right side and 1+ on the left side  Posterior tibial pulses are 0 on the right side and 1+ on the left side     Pulmonary:      Effort: Pulmonary effort is normal    Abdominal:      General: Bowel sounds are normal  Musculoskeletal:        Feet:    Feet:      Right foot:      Protective Sensation: 10 sites tested  10 sites sensed  Left foot:      Protective Sensation: 10 sites tested  10 sites sensed  Skin:     Comments: Nails yellow-brown, 5mm thick, dystrophic, with crumbling subugunal debris x 10  Neurological:      General: No focal deficit present  Mental Status: She is alert  Psychiatric:         Mood and Affect: Mood normal             Diagnoses and all orders for this visit:    Onychomycosis    Atherosclerosis of native artery of right lower extremity with intermittent claudication (HCC)         Assessment:  Onychomycosis  PAD    Plan:  High risk  foot precautions reviewed with patient including the need to wear protective shoegear at all times when walking including in the home, the need to check feet all surfaces daily with a mirror and report and skin breaks to podiatry, the need to apply an emollient to skin of feet daily  Nails x 10 were debrided with double-action nail forceps of their thickness, length and width

## 2021-12-02 ENCOUNTER — OFFICE VISIT (OUTPATIENT)
Dept: PODIATRY | Facility: CLINIC | Age: 76
End: 2021-12-02
Payer: COMMERCIAL

## 2021-12-02 VITALS
HEART RATE: 87 BPM | DIASTOLIC BLOOD PRESSURE: 80 MMHG | BODY MASS INDEX: 30.57 KG/M2 | WEIGHT: 207 LBS | SYSTOLIC BLOOD PRESSURE: 182 MMHG

## 2021-12-02 DIAGNOSIS — I70.211 ATHEROSCLEROSIS OF NATIVE ARTERY OF RIGHT LOWER EXTREMITY WITH INTERMITTENT CLAUDICATION (HCC): Chronic | ICD-10-CM

## 2021-12-02 DIAGNOSIS — B35.1 ONYCHOMYCOSIS: Primary | ICD-10-CM

## 2021-12-02 PROCEDURE — 11721 DEBRIDE NAIL 6 OR MORE: CPT | Performed by: PODIATRIST

## 2022-01-13 ENCOUNTER — OFFICE VISIT (OUTPATIENT)
Dept: URGENT CARE | Facility: MEDICAL CENTER | Age: 77
End: 2022-01-13
Payer: COMMERCIAL

## 2022-01-13 VITALS
HEART RATE: 106 BPM | SYSTOLIC BLOOD PRESSURE: 210 MMHG | OXYGEN SATURATION: 99 % | DIASTOLIC BLOOD PRESSURE: 100 MMHG | TEMPERATURE: 97.7 F | RESPIRATION RATE: 20 BRPM

## 2022-01-13 DIAGNOSIS — S81.802A AVULSION OF SKIN OF LEFT LOWER LEG, INITIAL ENCOUNTER: Primary | ICD-10-CM

## 2022-01-13 PROCEDURE — 99213 OFFICE O/P EST LOW 20 MIN: CPT | Performed by: PHYSICIAN ASSISTANT

## 2022-01-13 PROCEDURE — 90471 IMMUNIZATION ADMIN: CPT | Performed by: PHYSICIAN ASSISTANT

## 2022-01-13 PROCEDURE — 90715 TDAP VACCINE 7 YRS/> IM: CPT

## 2022-01-13 RX ORDER — CEPHALEXIN 500 MG/1
500 CAPSULE ORAL EVERY 12 HOURS SCHEDULED
Qty: 10 CAPSULE | Refills: 0 | Status: SHIPPED | OUTPATIENT
Start: 2022-01-13 | End: 2022-01-18

## 2022-01-13 RX ORDER — MELOXICAM 7.5 MG/1
TABLET ORAL
COMMUNITY
Start: 2022-01-06

## 2022-01-13 NOTE — PROGRESS NOTES
[facility]        NAME: Santo Rosas is a 64 y o  female  : 1945    MRN: [id number]  DATE: 2022  TIME: 1:41 PM    Assessment and Plan   Avulsion of skin of left lower leg, initial encounter [H47 [id number]]  1  Avulsion of skin of left lower leg, initial encounter  Tdap Vaccine greater than or equal to 6yo    cephalexin (KEFLEX) 500 mg capsule         Patient Instructions       Follow up with PCP in 3-5 days  Proceed to  ER if symptoms worsen  Chief Complaint     Chief Complaint   Patient presents with    Leg Injury     pt cut bottom of left leg on car door yesterday  History of Present Illness       Patient for evaluation of a open wound to her left lower leg  Patient states she bumped on a door and caused a flap laceration  Patient denies any worsening pain or swelling  Review of Systems   Review of Systems   Constitutional: Negative  Musculoskeletal: Negative  Skin: Positive for wound  Negative for color change, pallor and rash           Current Medications       Current Outpatient Medications:     acetaminophen (TYLENOL) 325 mg tablet, Take 650 mg by mouth every 6 (six) hours as needed for mild pain, Disp: , Rfl:     ALPRAZolam (XANAX) 0 25 mg tablet, Take 0 25 mg by mouth as needed , Disp: , Rfl:     aspirin (ECOTRIN LOW STRENGTH) 81 mg EC tablet, Take 81 mg by mouth daily, Disp: , Rfl:     cephalexin (KEFLEX) 500 mg capsule, Take 1 capsule (500 mg total) by mouth every 12 (twelve) hours for 5 days, Disp: 10 capsule, Rfl: 0    ciclopirox (LOPROX) 0 77 % cream, APPLY TWICE A DAY TO AFFECTED AREA IN THE GROIN FOR 3 WEEKS (Patient not taking: Reported on 2021), Disp: , Rfl:     diphenhydrAMINE (BENADRYL) 25 mg tablet, Take 25 mg by mouth 2 (two) times a day, Disp: , Rfl:     glimepiride (AMARYL) 4 mg tablet, Take 4 mg by mouth daily with breakfast , Disp: , Rfl:     ibuprofen (MOTRIN) 200 mg tablet, Take by mouth every 6 (six) hours as needed for mild pain, Disp: , Rfl:     lovastatin (MEVACOR) 40 MG tablet, , Disp: , Rfl:     meloxicam (MOBIC) 7 5 mg tablet, , Disp: , Rfl:     quinapril (ACCUPRIL) 40 MG tablet, Take 40 mg by mouth 2 (two) times a day  , Disp: , Rfl:     Current Allergies     Allergies as of 01/13/2022 - Reviewed 01/13/2022   Allergen Reaction Noted    Amlodipine GI Intolerance 10/26/2020    Atenolol GI Intolerance 10/26/2020    Clonidine Other (See Comments) 03/23/2021    Conjugated estrogens Hypertension 01/28/2021    Felodipine GI Intolerance and Dizziness 12/01/2020    Furosemide GI Intolerance 10/26/2020    Hydrochlorothiazide w-triamterene GI Intolerance 10/26/2020    Labetalol GI Intolerance and Dizziness 12/01/2020    Losartan  09/21/2020    Meperidine  05/11/2016    Metoprolol GI Intolerance 10/26/2020    Morphine and related  05/11/2016    Neosporin [neomycin-bacitracin zn-polymyx]  10/03/2018    Spironolactone Other (See Comments) 01/05/2021    Sudafed [pseudoephedrine]  06/22/2018    Colloidal oatmeal Rash 01/28/2021            The following portions of the patient's history were reviewed and updated as appropriate: allergies, current medications, past family history, past medical history, past social history, past surgical history and problem list      Past Medical History:   Diagnosis Date    HTN (hypertension)     Melanoma of face ([address] )        Past Surgical History:   Procedure Laterality Date    HAND SURGERY Right 09/16/2021    HYSTERECTOMY      IR AORTAGRAM WITH RUN-OFF  11/15/2018    MASTECTOMY      REPLACEMENT TOTAL KNEE Left        Family History   Problem Relation Age of Onset    Diabetes Mother     Hypertension Mother     Atrial fibrillation Father          Medications have been verified  Objective   BP (!) 210/100   Pulse (!) 106   Temp 97 7 °F (36 5 °C)   Resp 20   SpO2 99%   No LMP recorded   Patient is postmenopausal        Physical Exam     Physical Exam  Vitals and nursing note reviewed  Constitutional:       General: She is not in acute distress  Appearance: Normal appearance  She is well-developed  She is not ill-appearing, toxic-appearing or diaphoretic  HENT:      Head: Normocephalic and atraumatic  Eyes:      Extraocular Movements: Extraocular movements intact  Conjunctiva/sclera: Conjunctivae normal       Pupils: Pupils are equal, round, and reactive to light  Skin:     General: Skin is warm and dry  Findings: No rash  Comments: Superficial flap laceration of the left lower leg  No induration  No erythema  Flap realigned and dressing applied  Neurological:      General: No focal deficit present  Mental Status: She is alert and oriented to person, place, and time  Psychiatric:         Mood and Affect: Mood normal          Behavior: Behavior normal          Thought Content: Thought content normal          Judgment: Judgment normal        Given the patient's history of atherosclerosis and knee replacement will start her on Keflex twice a day for 5 days

## 2022-01-13 NOTE — PATIENT INSTRUCTIONS
1  Drink plenty fluids  2   Take probiotics [i e  Yogurt, Acidophilus, Florastor (liquid)] daily  3   Over-the-counter medications as needed for symptomatic care  4    Advance activities as tolerated  5    Follow-up with your primary care physician in 3-4 days  6   Go to emergency room if symptoms are worsening      7   Local wound care daily as directed

## 2022-02-02 ENCOUNTER — APPOINTMENT (OUTPATIENT)
Dept: LAB | Facility: MEDICAL CENTER | Age: 77
End: 2022-02-02
Payer: COMMERCIAL

## 2022-02-02 DIAGNOSIS — E78.2 MIXED HYPERLIPIDEMIA: ICD-10-CM

## 2022-02-02 DIAGNOSIS — E11.9 TYPE 2 DIABETES MELLITUS WITHOUT COMPLICATION, UNSPECIFIED WHETHER LONG TERM INSULIN USE (HCC): ICD-10-CM

## 2022-02-02 LAB
ANION GAP SERPL CALCULATED.3IONS-SCNC: 6 MMOL/L (ref 4–13)
BUN SERPL-MCNC: 15 MG/DL (ref 5–25)
CALCIUM SERPL-MCNC: 9.5 MG/DL (ref 8.3–10.1)
CHLORIDE SERPL-SCNC: 108 MMOL/L (ref 100–108)
CHOLEST SERPL-MCNC: 172 MG/DL
CO2 SERPL-SCNC: 26 MMOL/L (ref 21–32)
CREAT SERPL-MCNC: 1.18 MG/DL (ref 0.6–1.3)
ERYTHROCYTE [DISTWIDTH] IN BLOOD BY AUTOMATED COUNT: 12.6 % (ref 11.6–15.1)
GFR SERPL CREATININE-BSD FRML MDRD: 44 ML/MIN/1.73SQ M
GLUCOSE P FAST SERPL-MCNC: 214 MG/DL (ref 65–99)
HCT VFR BLD AUTO: 46.7 % (ref 34.8–46.1)
HDLC SERPL-MCNC: 32 MG/DL
HGB BLD-MCNC: 15 G/DL (ref 11.5–15.4)
LDLC SERPL CALC-MCNC: 87 MG/DL (ref 0–100)
MCH RBC QN AUTO: 30.7 PG (ref 26.8–34.3)
MCHC RBC AUTO-ENTMCNC: 32.1 G/DL (ref 31.4–37.4)
MCV RBC AUTO: 96 FL (ref 82–98)
NONHDLC SERPL-MCNC: 140 MG/DL
PLATELET # BLD AUTO: 277 THOUSANDS/UL (ref 149–390)
PMV BLD AUTO: 11.7 FL (ref 8.9–12.7)
POTASSIUM SERPL-SCNC: 4.1 MMOL/L (ref 3.5–5.3)
RBC # BLD AUTO: 4.88 MILLION/UL (ref 3.81–5.12)
SODIUM SERPL-SCNC: 140 MMOL/L (ref 136–145)
TRIGL SERPL-MCNC: 267 MG/DL
WBC # BLD AUTO: 8.53 THOUSAND/UL (ref 4.31–10.16)

## 2022-02-02 PROCEDURE — 80048 BASIC METABOLIC PNL TOTAL CA: CPT

## 2022-02-02 PROCEDURE — 36415 COLL VENOUS BLD VENIPUNCTURE: CPT

## 2022-02-02 PROCEDURE — 83036 HEMOGLOBIN GLYCOSYLATED A1C: CPT

## 2022-02-02 PROCEDURE — 80061 LIPID PANEL: CPT

## 2022-02-02 PROCEDURE — 85027 COMPLETE CBC AUTOMATED: CPT

## 2022-02-03 LAB
EST. AVERAGE GLUCOSE BLD GHB EST-MCNC: 194 MG/DL
HBA1C MFR BLD: 8.4 %

## 2022-03-02 ENCOUNTER — OFFICE VISIT (OUTPATIENT)
Dept: PODIATRY | Facility: CLINIC | Age: 77
End: 2022-03-02
Payer: COMMERCIAL

## 2022-03-02 VITALS
HEART RATE: 96 BPM | DIASTOLIC BLOOD PRESSURE: 96 MMHG | SYSTOLIC BLOOD PRESSURE: 195 MMHG | WEIGHT: 203 LBS | BODY MASS INDEX: 29.98 KG/M2

## 2022-03-02 DIAGNOSIS — I70.211 ATHEROSCLEROSIS OF NATIVE ARTERY OF RIGHT LOWER EXTREMITY WITH INTERMITTENT CLAUDICATION (HCC): Chronic | ICD-10-CM

## 2022-03-02 DIAGNOSIS — B35.1 ONYCHOMYCOSIS: Primary | ICD-10-CM

## 2022-03-02 PROCEDURE — 11721 DEBRIDE NAIL 6 OR MORE: CPT | Performed by: PODIATRIST

## 2022-03-02 NOTE — PROGRESS NOTES
Date Patient Seen: 3/2/22       Subjective:     Chief Complaint:  High risk footcare     Patient ID: Izaiah Mcgee is a 64 y o  female  Eli Sanches is here for high risk foot care  She has a longstanding history of PAD  [facility]  Pt denies any numbness, tingling, or  coldness   Pt denies any open wounds  Pt denies Claudication or rest pain  Pt taking ASA 81 mg  and Lovastatin  She is 64-year-old female with HTN, HLD, PAD s/p L SFA angioplasty '18 ([facility]) who returns to the office for yearly visit to review LEAD 2/12/20  LEAD showed - R SFA >75% stenosis followed by short segment occlusion, R JOANA 0 56/100/76 and L diffuse disease without focal stenosis, L JOANA 1 17/108/21                      The following portions of the patient's history were reviewed and updated as appropriate: allergies, current medications, past family history, past medical history, past social history, past surgical history and problem list     Review of Systems   Constitutional: Negative  Respiratory: Negative  Cardiovascular: Negative  Gastrointestinal: Negative  Musculoskeletal: Positive for gait problem  She is getting some new calf cramps after walking one block Right  Hematological: Negative  Psychiatric/Behavioral: Negative  Objective:      BP (!) 195/96   Pulse 96   Wt 92 1 kg (203 lb)   BMI 29 98 kg/m²        Physical Exam  Vitals and nursing note reviewed  Constitutional:       General: She is not in acute distress  Appearance: Normal appearance  She is not ill-appearing, toxic-appearing or diaphoretic  HENT:      Head: Normocephalic  Cardiovascular:      Rate and Rhythm: Normal rate  Pulses:           Dorsalis pedis pulses are 0 on the right side and 1+ on the left side  Posterior tibial pulses are 0 on the right side and 1+ on the left side     Pulmonary:      Effort: Pulmonary effort is normal    Abdominal:      General: Bowel sounds are normal    Musculoskeletal: Feet:    Feet:      Right foot:      Protective Sensation: 10 sites tested  10 sites sensed  Left foot:      Protective Sensation: 10 sites tested  10 sites sensed  Skin:     Comments: Nails yellow-brown, 5mm thick, dystrophic, with crumbling subugunal debris x 10  Neurological:      General: No focal deficit present  Mental Status: She is alert  Psychiatric:         Mood and Affect: Mood normal             Diagnoses and all orders for this visit:    Onychomycosis    Atherosclerosis of native artery of right lower extremity with intermittent claudication (HCC)         Assessment:  Onychomycosis  PAD    Plan:  High risk  foot precautions reviewed with patient including the need to wear protective shoegear at all times when walking including in the home, the need to check feet all surfaces daily with a mirror and report and skin breaks to podiatry, the need to apply an emollient to skin of feet daily  Nails x 10 were debrided with double-action nail forceps of their thickness, length and width

## 2022-05-02 ENCOUNTER — APPOINTMENT (OUTPATIENT)
Dept: LAB | Facility: MEDICAL CENTER | Age: 77
End: 2022-05-02
Payer: COMMERCIAL

## 2022-05-02 DIAGNOSIS — I10 ESSENTIAL HYPERTENSION, MALIGNANT: ICD-10-CM

## 2022-05-02 DIAGNOSIS — E11.9 TYPE 2 DIABETES MELLITUS WITHOUT COMPLICATION, WITHOUT LONG-TERM CURRENT USE OF INSULIN (HCC): ICD-10-CM

## 2022-05-02 DIAGNOSIS — I73.9 PERIPHERAL VASCULAR DISEASE, UNSPECIFIED (HCC): ICD-10-CM

## 2022-05-02 DIAGNOSIS — E66.3 OVERWEIGHT (BMI 25.0-29.9): ICD-10-CM

## 2022-05-02 DIAGNOSIS — E04.1 LEFT THYROID NODULE: ICD-10-CM

## 2022-05-02 DIAGNOSIS — E78.2 MIXED HYPERLIPIDEMIA: ICD-10-CM

## 2022-05-02 LAB
ANION GAP SERPL CALCULATED.3IONS-SCNC: 5 MMOL/L (ref 4–13)
BUN SERPL-MCNC: 20 MG/DL (ref 5–25)
CALCIUM SERPL-MCNC: 9.2 MG/DL (ref 8.3–10.1)
CHLORIDE SERPL-SCNC: 108 MMOL/L (ref 100–108)
CO2 SERPL-SCNC: 25 MMOL/L (ref 21–32)
CREAT SERPL-MCNC: 1.19 MG/DL (ref 0.6–1.3)
GFR SERPL CREATININE-BSD FRML MDRD: 43 ML/MIN/1.73SQ M
GLUCOSE P FAST SERPL-MCNC: 229 MG/DL (ref 65–99)
POTASSIUM SERPL-SCNC: 4.4 MMOL/L (ref 3.5–5.3)
SODIUM SERPL-SCNC: 138 MMOL/L (ref 136–145)

## 2022-05-02 PROCEDURE — 36415 COLL VENOUS BLD VENIPUNCTURE: CPT

## 2022-05-02 PROCEDURE — 80048 BASIC METABOLIC PNL TOTAL CA: CPT

## 2022-05-09 ENCOUNTER — HOSPITAL ENCOUNTER (OUTPATIENT)
Dept: NON INVASIVE DIAGNOSTICS | Facility: CLINIC | Age: 77
Discharge: HOME | End: 2022-05-09
Payer: COMMERCIAL

## 2022-05-09 DIAGNOSIS — I70.211 ATHEROSCLEROSIS OF NATIVE ARTERY OF RIGHT LOWER EXTREMITY WITH INTERMITTENT CLAUDICATION (HCC): Chronic | ICD-10-CM

## 2022-05-09 PROCEDURE — 93925 LOWER EXTREMITY STUDY: CPT

## 2022-05-09 PROCEDURE — 93925 LOWER EXTREMITY STUDY: CPT | Performed by: SURGERY

## 2022-05-09 PROCEDURE — 93922 UPR/L XTREMITY ART 2 LEVELS: CPT | Performed by: SURGERY

## 2022-05-09 PROCEDURE — 93923 UPR/LXTR ART STDY 3+ LVLS: CPT

## 2022-05-10 ENCOUNTER — TELEPHONE (OUTPATIENT)
Dept: SURGICAL ONCOLOGY | Facility: CLINIC | Age: 77
End: 2022-05-10

## 2022-05-10 NOTE — TELEPHONE ENCOUNTER
Called patient and notified her appointment for Friday 5/13 was r/s with Kahty since Dr Branden Livingston will be in surgery

## 2022-05-11 ENCOUNTER — TELEPHONE (OUTPATIENT)
Dept: VASCULAR SURGERY | Facility: CLINIC | Age: 77
End: 2022-05-11

## 2022-05-11 DIAGNOSIS — I73.9 PAD (PERIPHERAL ARTERY DISEASE) (HCC): ICD-10-CM

## 2022-05-11 DIAGNOSIS — I70.211 ATHEROSCLEROSIS OF NATIVE ARTERY OF RIGHT LOWER EXTREMITY WITH INTERMITTENT CLAUDICATION (HCC): Primary | ICD-10-CM

## 2022-05-11 NOTE — TELEPHONE ENCOUNTER
Pt called back and said her symptoms are unchanged  Advised her to call back w/ any new or worsening symptoms  Pt verbalized understanding  Please put in recall for VERN in 12 months

## 2022-05-11 NOTE — TELEPHONE ENCOUNTER
----- Message from Veronica Mcmahon MD sent at 5/11/2022  8:23 AM EDT -----  Please check with patient to see if claudication worse or not  If it is then OV but if not repeat VERN in 12m with ov after      Thanks  ----- Message -----  From: Kimberly Fairchild  Sent: 5/10/2022   9:35 AM EDT  To: Veronica Mcmahon MD    Task study to [facility] [place]

## 2022-05-13 ENCOUNTER — OFFICE VISIT (OUTPATIENT)
Dept: SURGICAL ONCOLOGY | Facility: CLINIC | Age: 77
End: 2022-05-13
Payer: COMMERCIAL

## 2022-05-13 VITALS
BODY MASS INDEX: 29.62 KG/M2 | OXYGEN SATURATION: 98 % | HEIGHT: 69 IN | SYSTOLIC BLOOD PRESSURE: 150 MMHG | TEMPERATURE: 98 F | WEIGHT: 200 LBS | DIASTOLIC BLOOD PRESSURE: 98 MMHG | RESPIRATION RATE: 18 BRPM | HEART RATE: 87 BPM

## 2022-05-13 DIAGNOSIS — C43.59 MALIGNANT MELANOMA OF TORSO EXCLUDING BREAST (HCC): Primary | ICD-10-CM

## 2022-05-13 DIAGNOSIS — M79.89 OTHER SPECIFIED SOFT TISSUE DISORDERS: ICD-10-CM

## 2022-05-13 PROCEDURE — 99213 OFFICE O/P EST LOW 20 MIN: CPT

## 2022-05-13 NOTE — PROGRESS NOTES
Surgical Oncology Follow Up       [address] [facility] SURGICAL ONCOLOGY [place]  [address]  [facility] [address]    Cedric Young  1945  [id number]  [address] [facility] SURGICAL ONCOLOGY [place]  [address]  [place] [address]    Diagnoses and all orders for this visit:    Malignant melanoma of torso excluding breast ([facility] [address] )  -     US head neck lymph node mapping; Future    Other specified soft tissue disorders  -     US head neck lymph node mapping; Future        Chief Complaint   Patient presents with    Melanoma     1 year follow up          Return in about 1 year (around 5/13/2023) for Office visit with Dr Steve Lo  Oncology History   Melanoma ([facility] [address] )   3/18/2021 Initial Diagnosis    Melanoma ([facility] [address] )     3/18/2021 Biopsy    Left upper back shave biopsy:  Malignant melanoma   - at least 0 4 mm thickness   - 0 mitosis   - negative ulceration     4/20/2021 Surgery    In office wide excision  A  Skin, left upper back, excision:  - Residual melanoma in-situ; prior biopsy site reaction   - Atypical lentiginous compound nevus  - Inked margins with no tumor seen         Staging: [id number]  Melanoma of the left upper back, April 2021  Treatment history:  Wide excision left upper back melanoma, April 2021  Current treatment:  observation  Disease status: ANDRA    History of Present Illness: The patient returns today for follow-up of a stage 1 melanoma of her left upper back  She is ANDRA at one year, and denies any new lumps or lesions  She denies any new SOB, cough, abdominal pain, weight loss or change in appetite  She also denies any headaches or dizziness  She states she recently has arthroscopic surgery on her right knee and is recovering from that  She maintains regular follow-up with her dermatologist, Patito Chaney      Review of Systems   Constitutional: Negative for activity change, appetite change, fatigue and unexpected weight change  HENT: Negative  Eyes: Negative  Respiratory: Negative for cough and shortness of breath  Cardiovascular: Negative for chest pain  Gastrointestinal: Negative for abdominal distention, abdominal pain, nausea and vomiting  Endocrine: Negative  Genitourinary: Negative  Musculoskeletal: Positive for arthralgias, gait problem and joint swelling  S/p right knee surgery   Skin: Negative for color change, pallor, rash and wound  Allergic/Immunologic: Negative  Neurological: Negative for dizziness, light-headedness and headaches  Hematological: Negative for adenopathy  Does not bruise/bleed easily  Psychiatric/Behavioral: Negative              Patient Active Problem List   Diagnosis    Toe cyanosis    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)      Past Surgical History:   Procedure Laterality Date    HAND SURGERY Right 09/16/2021    HYSTERECTOMY      IR AORTAGRAM WITH RUN-OFF  11/15/2018    MASTECTOMY      REPLACEMENT TOTAL KNEE Left      Family History   Problem Relation Age of Onset    Diabetes Mother     Hypertension Mother     Atrial fibrillation Father      Social History     Socioeconomic History    Marital status: /Civil Union     Spouse name: Not on file    Number of children: Not on file    Years of education: Not on file    Highest education level: Not on file   Occupational History    Not on file   Tobacco Use    Smoking status: Never Smoker    Smokeless tobacco: Never Used   Vaping Use    Vaping Use: Never used   Substance and Sexual Activity    Alcohol use: No    Drug use: No    Sexual activity: Not on file   Other Topics Concern    Not on file   Social History Narrative    Not on file     Social Determinants of Health     Financial Resource Strain: Not on file   Food Insecurity: Not on file   Transportation Needs: Not on file   Physical Activity: Not on file   Stress: Not on file   Social Connections: Not on file   Intimate Partner Violence: Not on file   Housing Stability: Not on file       Current Outpatient Medications:     acetaminophen (TYLENOL) 325 mg tablet, Take 650 mg by mouth every 6 (six) hours as needed for mild pain, Disp: , Rfl:     ALPRAZolam (XANAX) 0 25 mg tablet, Take 0 25 mg by mouth as needed , Disp: , Rfl:     aspirin (ECOTRIN LOW STRENGTH) 81 mg EC tablet, Take 81 mg by mouth daily, Disp: , Rfl:     diphenhydrAMINE (BENADRYL) 25 mg tablet, Take 25 mg by mouth 2 (two) times a day, Disp: , Rfl:     glimepiride (AMARYL) 4 mg tablet, Take 4 mg by mouth daily with breakfast , Disp: , Rfl:     ibuprofen (MOTRIN) 200 mg tablet, Take by mouth every 6 (six) hours as needed for mild pain, Disp: , Rfl:     lovastatin (MEVACOR) 40 MG tablet, , Disp: , Rfl:     meloxicam (MOBIC) 7 5 mg tablet, , Disp: , Rfl:     quinapril (ACCUPRIL) 40 MG tablet, Take 40 mg by mouth 2 (two) times a day  , Disp: , Rfl:     ciclopirox (LOPROX) 0 77 % cream, APPLY TWICE A DAY TO AFFECTED AREA IN THE GROIN FOR 3 WEEKS, Disp: , Rfl:   Allergies   Allergen Reactions    Amlodipine GI Intolerance    Atenolol GI Intolerance    Clonidine Other (See Comments)     Patient reports she did not tolerate, unspecified side effects      Conjugated Estrogens Hypertension    Felodipine GI Intolerance and Dizziness     Per pt    Furosemide GI Intolerance    Hydrochlorothiazide W-Triamterene GI Intolerance    Labetalol GI Intolerance and Dizziness     Per pt      Losartan      Headache, nausea    Meperidine     Metoprolol GI Intolerance    Morphine And Related      hallucinations  hallucinations    Neosporin [Neomycin-Bacitracin Zn-Polymyx]      rash  rash    Spironolactone Other (See Comments)     optical migraine    Sudafed [Pseudoephedrine]     Colloidal Oatmeal Rash     Vitals:    05/13/22 1146   BP: 150/98   Pulse: 87   Resp: 18   Temp: 98 °F (36 7 °C)   SpO2: 98%       Physical Exam  Vitals reviewed  Constitutional:       General: She is not in acute distress  Appearance: Normal appearance  She is not ill-appearing or toxic-appearing  HENT:      Head: Normocephalic and atraumatic  Nose: Nose normal       Mouth/Throat:      Mouth: Mucous membranes are moist       Pharynx: Oropharynx is clear  Eyes:      General: No scleral icterus  Extraocular Movements: Extraocular movements intact  Conjunctiva/sclera: Conjunctivae normal       Pupils: Pupils are equal, round, and reactive to light  Neck:     Cardiovascular:      Rate and Rhythm: Normal rate  Pulmonary:      Effort: Pulmonary effort is normal    Chest:   Breasts:      Right: No axillary adenopathy or supraclavicular adenopathy  Left: No axillary adenopathy  Abdominal:      General: Abdomen is flat  Palpations: Abdomen is soft  Musculoskeletal:      Cervical back: Normal range of motion  No tenderness  Lymphadenopathy:      Cervical: No cervical adenopathy  Right cervical: No superficial, deep or posterior cervical adenopathy  Upper Body:      Right upper body: No supraclavicular or axillary adenopathy  Left upper body: No axillary adenopathy  Comments: Asymmetric fullness in left neck, anterolateral   Skin:     General: Skin is warm and dry  Coloration: Skin is not jaundiced  Findings: No erythema, lesion or rash  Comments: Well-healed left upper back scar without nodularity or pigmentation  No evidence of in-transit lesions  Neurological:      General: No focal deficit present  Mental Status: She is alert and oriented to person, place, and time  Psychiatric:         Mood and Affect: Mood normal          Behavior: Behavior normal          Thought Content:  Thought content normal          Judgment: Judgment normal          Discussion/Summary: This is a pleasant 63 y/o female who presents to the office today for melanoma surveillance  She has not noticed anything new  However, on examination, there is obvious fullness in the left anterolateral neck  In addition to this most recent melanoma diagnosis, she reports she had Mohs surgery for melanoma on her left face several years ago  Although I don't feel an individual lymph node or discreet mass, given her history, I think it would be prudent to send her for directed imaging  I will order an US to be done now and I will call her with those results  Assuming the US is normal, we will plan to see her back in 1 year for another clinical exam   The patient is agreeable to the plan, all questions have been answered

## 2022-05-24 ENCOUNTER — HOSPITAL ENCOUNTER (OUTPATIENT)
Dept: RADIOLOGY | Facility: MEDICAL CENTER | Age: 77
Discharge: HOME | End: 2022-05-24
Payer: COMMERCIAL

## 2022-05-24 DIAGNOSIS — C43.59 MALIGNANT MELANOMA OF TORSO EXCLUDING BREAST (HCC): ICD-10-CM

## 2022-05-24 DIAGNOSIS — M79.89 OTHER SPECIFIED SOFT TISSUE DISORDERS: ICD-10-CM

## 2022-05-24 PROCEDURE — 76536 US EXAM OF HEAD AND NECK: CPT

## 2022-05-27 ENCOUNTER — TELEPHONE (OUTPATIENT)
Dept: SURGICAL ONCOLOGY | Facility: CLINIC | Age: 77
End: 2022-05-27

## 2022-05-27 DIAGNOSIS — E04.1 THYROID NODULE: Primary | ICD-10-CM

## 2022-05-27 NOTE — TELEPHONE ENCOUNTER
Called pt to discuss US results  Explained that cervical lymph nodes look normal, but there is a 2 3 cm left-sided thyroid nodule for which biopsy is recommended  Pt states she was told she had a nodule several years ago  Upon reviewing her prior imaging, CT in September 2020 does mention a thyroid nodule measuring 1cm  Since the current nodule is significantly larger, I stressed th importance of a biopsy  Pt is agreeable, would prefer the [facility]  I will call patient with biopsy results and follow-up plan when available

## 2022-06-02 ENCOUNTER — OFFICE VISIT (OUTPATIENT)
Dept: PODIATRY | Facility: CLINIC | Age: 77
End: 2022-06-02
Payer: COMMERCIAL

## 2022-06-02 VITALS
SYSTOLIC BLOOD PRESSURE: 184 MMHG | DIASTOLIC BLOOD PRESSURE: 83 MMHG | WEIGHT: 198 LBS | BODY MASS INDEX: 29.24 KG/M2 | HEART RATE: 90 BPM

## 2022-06-02 DIAGNOSIS — I70.211 ATHEROSCLEROSIS OF NATIVE ARTERY OF RIGHT LOWER EXTREMITY WITH INTERMITTENT CLAUDICATION (HCC): Chronic | ICD-10-CM

## 2022-06-02 DIAGNOSIS — B35.1 ONYCHOMYCOSIS: Primary | ICD-10-CM

## 2022-06-02 PROCEDURE — 11721 DEBRIDE NAIL 6 OR MORE: CPT | Performed by: PODIATRIST

## 2022-06-30 NOTE — NURSING NOTE
Call placed to patient to discuss upcoming ultrasound guided thyroid biopsy with Delon at [facility]  Per patient does not remember all of her allergies, but does not believe she has ever had any issues with lidocaine previously  Verified patient currently takes ASA 81 mg daily, but not required to stop per protocol  Pre procedure instructions including diet and taking own medications discussed with patient  Patient instructed that she may eat normally and take medications including ASA as usual before the procedure  Procedure and post procedure expectations and instructions reviewed with the patient  Patient verbalizes understanding and denies any questions at this time  Reminded of the location, date and time of the expected procedure  Per patient her  will be coming with her to her procedure  Name and contact number provided in case patient has any further questions

## 2022-07-07 ENCOUNTER — HOSPITAL ENCOUNTER (OUTPATIENT)
Dept: ULTRASOUND IMAGING | Facility: HOSPITAL | Age: 77
Discharge: HOME | End: 2022-07-07
Admitting: RADIOLOGY
Payer: COMMERCIAL

## 2022-07-07 DIAGNOSIS — E04.1 THYROID NODULE: ICD-10-CM

## 2022-07-07 PROCEDURE — 88173 CYTOPATH EVAL FNA REPORT: CPT | Performed by: PATHOLOGY

## 2022-07-07 PROCEDURE — 88172 CYTP DX EVAL FNA 1ST EA SITE: CPT | Performed by: PATHOLOGY

## 2022-07-07 PROCEDURE — 10005 FNA BX W/US GDN 1ST LES: CPT

## 2022-07-07 RX ORDER — LIDOCAINE HYDROCHLORIDE 10 MG/ML
5 INJECTION, SOLUTION EPIDURAL; INFILTRATION; INTRACAUDAL; PERINEURAL ONCE
Status: COMPLETED | OUTPATIENT
Start: 2022-07-07 | End: 2022-07-07

## 2022-07-07 RX ADMIN — LIDOCAINE HYDROCHLORIDE 5 ML: 10 INJECTION, SOLUTION EPIDURAL; INFILTRATION; INTRACAUDAL at 11:51

## 2022-07-22 NOTE — PROGRESS NOTES
Date Patient Seen: 6/2/22       Subjective:     Chief Complaint:  High risk footcare     Patient ID: Nas Sharma is a 64 y o  female  Dub Jan is here for high risk foot care  She has a longstanding history of PAD  [facility]  Pt denies any numbness, tingling, or  coldness   Pt denies any open wounds  Pt denies Claudication or rest pain                    The following portions of the patient's history were reviewed and updated as appropriate: allergies, current medications, past family history, past medical history, past social history, past surgical history and problem list     Review of Systems   Constitutional: Negative  Respiratory: Negative  Cardiovascular: Negative  Gastrointestinal: Negative  Musculoskeletal: Positive for gait problem  She is getting some new calf cramps after walking one block Right  Hematological: Negative  Psychiatric/Behavioral: Negative  Objective:      BP (!) 184/83   Pulse 90   Wt 89 8 kg (198 lb)   BMI 29 24 kg/m²        Physical Exam       Diagnoses and all orders for this visit:    Onychomycosis    Atherosclerosis of native artery of right lower extremity with intermittent claudication (HCC)         Assessment:  Onychomycosis  PAD    Plan:  High risk  foot precautions reviewed with patient including the need to wear protective shoegear at all times when walking including in the home, the need to check feet all surfaces daily with a mirror and report and skin breaks to podiatry, the need to apply an emollient to skin of feet daily  Nails x 10 were debrided with double-action nail forceps of their thickness, length and width

## 2022-08-03 ENCOUNTER — APPOINTMENT (OUTPATIENT)
Dept: LAB | Facility: MEDICAL CENTER | Age: 77
End: 2022-08-03
Payer: COMMERCIAL

## 2022-08-03 DIAGNOSIS — E11.9 DIABETES MELLITUS WITHOUT COMPLICATION (HCC): ICD-10-CM

## 2022-08-03 DIAGNOSIS — I10 ESSENTIAL HYPERTENSION, MALIGNANT: ICD-10-CM

## 2022-08-03 LAB
ANION GAP SERPL CALCULATED.3IONS-SCNC: 6 MMOL/L (ref 4–13)
BUN SERPL-MCNC: 19 MG/DL (ref 5–25)
CALCIUM SERPL-MCNC: 9.8 MG/DL (ref 8.3–10.1)
CHLORIDE SERPL-SCNC: 107 MMOL/L (ref 96–108)
CO2 SERPL-SCNC: 25 MMOL/L (ref 21–32)
CREAT SERPL-MCNC: 1.44 MG/DL (ref 0.6–1.3)
GFR SERPL CREATININE-BSD FRML MDRD: 34 ML/MIN/1.73SQ M
GLUCOSE P FAST SERPL-MCNC: 253 MG/DL (ref 65–99)
POTASSIUM SERPL-SCNC: 4.7 MMOL/L (ref 3.5–5.3)
SODIUM SERPL-SCNC: 138 MMOL/L (ref 135–147)

## 2022-08-03 PROCEDURE — 80048 BASIC METABOLIC PNL TOTAL CA: CPT

## 2022-08-03 PROCEDURE — 83036 HEMOGLOBIN GLYCOSYLATED A1C: CPT

## 2022-08-03 PROCEDURE — 36415 COLL VENOUS BLD VENIPUNCTURE: CPT

## 2022-08-04 LAB
EST. AVERAGE GLUCOSE BLD GHB EST-MCNC: 246 MG/DL
HBA1C MFR BLD: 10.2 %

## 2022-08-11 ENCOUNTER — TELEPHONE (OUTPATIENT)
Dept: HEMATOLOGY ONCOLOGY | Facility: CLINIC | Age: 77
End: 2022-08-11

## 2022-08-11 NOTE — TELEPHONE ENCOUNTER
CALL TRANSFER   Reason for patient call? Patient calling in very concerned,  Requesting to discuss her biopsy results from 7/7  Patient's primary physician? Dr Ryan Martinez    RN call was transferred to and time it was transferred? Cat Greer, 11:31am   Informed patient that the message will be forwarded to the team and someone will get back to them as soon as possible    Did you relay this information to the patient?   Yes

## 2022-09-29 ENCOUNTER — OFFICE VISIT (OUTPATIENT)
Dept: PODIATRY | Facility: CLINIC | Age: 77
End: 2022-09-29

## 2022-09-29 VITALS
WEIGHT: 197.4 LBS | HEIGHT: 69 IN | BODY MASS INDEX: 29.24 KG/M2 | DIASTOLIC BLOOD PRESSURE: 94 MMHG | SYSTOLIC BLOOD PRESSURE: 149 MMHG | HEART RATE: 116 BPM

## 2022-09-29 DIAGNOSIS — B35.1 ONYCHOMYCOSIS: Primary | ICD-10-CM

## 2022-09-29 DIAGNOSIS — I70.211 ATHEROSCLEROSIS OF NATIVE ARTERY OF RIGHT LOWER EXTREMITY WITH INTERMITTENT CLAUDICATION (HCC): Chronic | ICD-10-CM

## 2022-09-29 PROCEDURE — 11721 DEBRIDE NAIL 6 OR MORE: CPT | Performed by: PODIATRIST

## 2022-10-04 NOTE — PROGRESS NOTES
Date Patient Seen: 9/29/22       Subjective:     Chief Complaint:  Foot care with PAD     Patient ID: Riky Billy is a 64 y o  female  Sheryl Tillman is here for high risk foot care  She has a longstanding history of PAD  [facility]  Pt denies any numbness, tingling, or  coldness   Pt denies any open wounds                The following portions of the patient's history were reviewed and updated as appropriate: allergies, current medications, past family history, past medical history, past social history, past surgical history and problem list     Review of Systems   Constitutional: Negative  Respiratory: Negative  Cardiovascular: Negative  Gastrointestinal: Negative  Musculoskeletal: Positive for gait problem  She is gets calf cramps after walking one block Right  Hematological: Negative  Psychiatric/Behavioral: Negative  Objective:      /94   Pulse (!) 116   Ht 5' 9" (1 753 m) Comment: verbal  Wt 89 5 kg (197 lb 6 4 oz)   BMI 29 15 kg/m²        Physical Exam  Vitals and nursing note reviewed  Constitutional:       General: She is not in acute distress  Appearance: Normal appearance  She is not ill-appearing, toxic-appearing or diaphoretic  HENT:      Head: Normocephalic  Cardiovascular:      Rate and Rhythm: Normal rate  Pulses:           Dorsalis pedis pulses are 0 on the right side and 1+ on the left side  Posterior tibial pulses are 0 on the right side and 1+ on the left side  Pulmonary:      Effort: Pulmonary effort is normal    Abdominal:      General: Bowel sounds are normal    Musculoskeletal:        Feet:    Feet:      Right foot:      Protective Sensation: 10 sites tested  10 sites sensed  Left foot:      Protective Sensation: 10 sites tested  10 sites sensed  Skin:     Comments: Nails yellow-brown, 5mm thick, dystrophic, with crumbling subugunal debris x 10  Neurological:      General: No focal deficit present        Mental Status: She is alert  Psychiatric:         Mood and Affect: Mood normal             Diagnoses and all orders for this visit:    Onychomycosis    Atherosclerosis of native artery of right lower extremity with intermittent claudication (HCC)         Assessment:  Onychomyosis  PAD    Plan:  Nails x 10 were debrided with double-action nail forceps of their thickness, length and width

## 2022-11-16 ENCOUNTER — PATIENT MESSAGE (OUTPATIENT)
Dept: DERMATOLOGY | Facility: CLINIC | Age: 77
End: 2022-11-16

## 2022-11-16 DIAGNOSIS — Z79.2 PROPHYLACTIC ANTIBIOTIC: Primary | ICD-10-CM

## 2022-11-16 RX ORDER — AMOXICILLIN 500 MG/1
CAPSULE ORAL
Qty: 4 CAPSULE | Refills: 0 | Status: SHIPPED | OUTPATIENT
Start: 2022-11-16 | End: 2022-11-17

## 2022-11-16 NOTE — TELEPHONE ENCOUNTER
Patient's  called to schedule for Mohs procedure  Photo that was faxed to the office is not clear so a photo will need to be uploaded to [employer] for review and scheduling or Consult needs to be scheduled  Patient's  to check with [employer] help to regain access to the account to send photo in response to [place] message that was sent

## 2022-11-16 NOTE — TELEPHONE ENCOUNTER
Patient's  aware that the medication was sent to the pharmacy  They will pick this up and hold it until 1 hour prior to the Mohs procedure  No further questions at this time

## 2022-11-16 NOTE — TELEPHONE ENCOUNTER
Pre- operative Mohs Telephone Scheduling Note    Do you have a pacemaker or defibrillator? no    Do you take antibiotics before skin or dental procedures? yes: amoxicillin 2g 1 hour prior to dental procedures  If yes, will likely require pre-operative antibiotics  Ask  the patient why they take the antibiotics (usually because of joint replacement)  Do you have a history of a joint replacements within the past 2 years? no   If yes, will likely require pre-operative antibiotics  Ask if orthopaedic surgeon has prescribed pre-operative antibiotics to take before procedures/dental work? Do you take any OTC medications that thin your blood (Aspirin, Aleve, Ibuprofen) or supplements that thin your blood (fish oil, garlic, vitamin E, Ginko Biloba)? Yes; 81 mg ASA OTC    Do you take any prescribed medications that thin your blood (Coumadin, Plavix, Xarelto, Eliquis or another prescribed blood thinner)? yes: Eliquis    Do you have an allergy to lidocaine or epinephrine? no    Do you have an allergy to shellfish? no    Do you smoke? no      If yes,  patient to try and stop 2 days before surgery and 7 days after the surgery  Minimizing smoking as much as possible during this time will improve healing and the cosmetic result after surgery  Date scheduled: 12/19/22 @ 10:30 am    Coordination of Care with other provider (Oculoplastics, Plastics, ENT) required? no   IF YES, PLEASE FORWARD TO APPROPRIATE PERSONNEL TO HELP COORDINATE  Are there remaining tumors to be scheduled? no    Was Prior Authorization obtained?  No (please use  [email or web address] to document prior auth)

## 2022-12-05 ENCOUNTER — APPOINTMENT (OUTPATIENT)
Dept: LAB | Facility: MEDICAL CENTER | Age: 77
End: 2022-12-05

## 2022-12-05 DIAGNOSIS — R73.09 HEMOGLOBIN A1C ABOVE REFERENCE RANGE: ICD-10-CM

## 2022-12-05 DIAGNOSIS — E78.5 DYSLIPIDEMIA: ICD-10-CM

## 2022-12-05 DIAGNOSIS — E11.00 TYPE II DIABETES MELLITUS WITH HYPEROSMOLARITY, UNCONTROLLED (HCC): ICD-10-CM

## 2022-12-05 DIAGNOSIS — E11.65 TYPE II DIABETES MELLITUS WITH HYPEROSMOLARITY, UNCONTROLLED (HCC): ICD-10-CM

## 2022-12-05 DIAGNOSIS — E11.69 TYPE 2 DIABETES MELLITUS WITH OTHER SPECIFIED COMPLICATION, UNSPECIFIED WHETHER LONG TERM INSULIN USE (HCC): ICD-10-CM

## 2022-12-05 LAB
ALBUMIN SERPL BCP-MCNC: 3.9 G/DL (ref 3.5–5)
ALP SERPL-CCNC: 71 U/L (ref 46–116)
ALT SERPL W P-5'-P-CCNC: 45 U/L (ref 12–78)
ANION GAP SERPL CALCULATED.3IONS-SCNC: 3 MMOL/L (ref 4–13)
AST SERPL W P-5'-P-CCNC: 28 U/L (ref 5–45)
BILIRUB SERPL-MCNC: 0.62 MG/DL (ref 0.2–1)
BUN SERPL-MCNC: 23 MG/DL (ref 5–25)
CALCIUM SERPL-MCNC: 9.7 MG/DL (ref 8.3–10.1)
CHLORIDE SERPL-SCNC: 111 MMOL/L (ref 96–108)
CHOLEST SERPL-MCNC: 121 MG/DL
CO2 SERPL-SCNC: 27 MMOL/L (ref 21–32)
CREAT SERPL-MCNC: 1.44 MG/DL (ref 0.6–1.3)
EST. AVERAGE GLUCOSE BLD GHB EST-MCNC: 186 MG/DL
GFR SERPL CREATININE-BSD FRML MDRD: 34 ML/MIN/1.73SQ M
GLUCOSE P FAST SERPL-MCNC: 185 MG/DL (ref 65–99)
HBA1C MFR BLD: 8.1 %
HDLC SERPL-MCNC: 35 MG/DL
LDLC SERPL CALC-MCNC: 54 MG/DL (ref 0–100)
NONHDLC SERPL-MCNC: 86 MG/DL
POTASSIUM SERPL-SCNC: 4.7 MMOL/L (ref 3.5–5.3)
PROT SERPL-MCNC: 7.2 G/DL (ref 6.4–8.4)
SODIUM SERPL-SCNC: 141 MMOL/L (ref 135–147)
TRIGL SERPL-MCNC: 160 MG/DL
TSH SERPL DL<=0.05 MIU/L-ACNC: 1.92 UIU/ML (ref 0.45–4.5)

## 2022-12-19 ENCOUNTER — PROCEDURE VISIT (OUTPATIENT)
Dept: DERMATOLOGY | Facility: CLINIC | Age: 77
End: 2022-12-19

## 2022-12-19 VITALS
HEART RATE: 76 BPM | TEMPERATURE: 97.3 F | OXYGEN SATURATION: 97 % | SYSTOLIC BLOOD PRESSURE: 160 MMHG | HEIGHT: 69 IN | DIASTOLIC BLOOD PRESSURE: 80 MMHG | BODY MASS INDEX: 28.85 KG/M2 | WEIGHT: 194.8 LBS

## 2022-12-19 DIAGNOSIS — D04.39 SQUAMOUS CELL CARCINOMA IN SITU (SCCIS) OF SKIN OF RIGHT CHEEK: Primary | ICD-10-CM

## 2022-12-19 RX ORDER — INSULIN DETEMIR 100 [IU]/ML
INJECTION, SOLUTION SUBCUTANEOUS
COMMUNITY
Start: 2022-12-06

## 2022-12-19 RX ORDER — METFORMIN HYDROCHLORIDE 500 MG/1
1 TABLET, EXTENDED RELEASE ORAL 2 TIMES DAILY WITH MEALS
COMMUNITY
Start: 2022-10-25 | End: 2023-10-25

## 2022-12-19 RX ORDER — APIXABAN 5 MG/1
5 TABLET, FILM COATED ORAL 2 TIMES DAILY
COMMUNITY
Start: 2022-11-01

## 2022-12-19 RX ORDER — GLIMEPIRIDE 4 MG/1
4 TABLET ORAL
COMMUNITY

## 2022-12-19 NOTE — PATIENT INSTRUCTIONS
Mohs Microscopic Surgery After Care    WOUND CARE AFTER SURGERY:    Do NOT to remove the pressure bandage for 48 hours  Keep the area clean and dry while this bandage is on  After removing the bandage for the first time, gently clean the area with soap and water  If the bandage is difficult to remove, getting the bandage wet in the shower will sometimes help soften the adhesive and allow it to be removed more easily  You will now need to cleanse this area daily in the shower with gentle soap  There is no need to scrub the area  You will need to apply plain Vaseline ointment (this is over the counter and not a prescription) to the site for up to 2 weeks followed by a clean appropriately sized bandage to area  Non stick dressing and paper tape (or Hypafix) are recommended for sensitive skin but a bandaid is fine if it covers the area well  All your stitches will dissolve over the next two weeks  You will need to keep these moist with Vaseline and covered with a bandage over the next 2 weeks for them to dissolve appropriately  RESTRICTIONS:     For two DAYS:   - You will need to take it very easy as this time is highest risk for bleeding  Being a "couch potato" during these two days is generally recommended  - For surgeries on the face/neck/scalp: Avoid leaning down to pick things up off the floor as this brings blood up to your head  Instead, squat down to pick things up  For two WEEKS:   - No heavy lifting (anything greater than 10 pounds)   - You can start to do slow, gentle activities such as slow walking but nothing to increase your heart rate and blood pressure too much (such as cardiovascular exercise)  It is important to take it easy as there is still a risk for bleeding and a high risk popping of stitches open during this time  If we did surgery near the eyes (including the nose, forehead, front part of your scalp, cheeks):  It is VERY common to get a large amount of swelling around your eyes (puffy eyes)  Although less frequent, this can be enough to swell your eyes shut and can also come along with bruising  This should not hurt and is very expected and normal  It is typically worst at ~ 3 days out from your surgery and dramatically better 1 week post-operatively  If we did surgery on the skin above or bellow your lip or your lip itself: No sipping from straws as this uses a lot of the muscles around your mouth and increases the risk of popping stitches during this time  MANAGING YOUR PAIN AFTER SURGERY     You can expect to have some pain after surgery  This is normal  The pain is typically worse the first two days after surgery, and quickly begins to get better  The best strategy for controlling your pain after surgery is around the clock pain control  You can take over the counter Acetaminophen (Tylenol) for discomfort, if no contraindications  If you are taking this at the maximum dose, you can alternate this with Motrin (ibuprofen or Advil) as well  Alternating these medications with each other allows you to maximize your pain control  In addition to Tylenol and Motrin, you can use heating pads or ice packs on your incisions to help reduce your pain  How will I alternate your regular strength over-the-counter pain medication? You will take a dose of pain medication every three hours  Start by taking 650 mg of Tylenol (2 pills of 325 mg)   3 hours later take 600 mg of Motrin (3 pills of 200 mg)   3 hours after taking the Motrin take 650 mg of Tylenol   3 hours after that take 600 mg of Motrin      See example - if your first dose of Tylenol is at 12:00 PM     12:00 PM  Tylenol 650 mg (2 pills of 325 mg)    3:00 PM  Motrin 600 mg (3 pills of 200 mg)    6:00 PM  Tylenol 650 mg (2 pills of 325 mg)    9:00 PM  Motrin 600 mg (3 pills of 200 mg)    Continue alternating every 3 hours      Important:   Do not take more than 4000mg of Tylenol or 3200mg of Motrin in a 24-hour period  What if I still have pain? If you have pain that is not controlled with the over-the-counter pain medications (Tylenol and Motrin or Advil), don't hesitate to call our staff using the number provided  We will help make sure you are managing your pain in the best way possible, and if necessary, we can provide a prescription for additional pain medication  CALL OUR OFFICE IMMEDIATELY FOR ANY SIGNS OF INFECTION:    This includes fever, chills, increased redness, warmth, tenderness, severe discomfort/pain, or pus or foul smell coming from the wound  If you are experiencing any of the above, please call [facility] directly at [phone] (SKIN)    IF BLEEDING IS NOTICED:    Place a clean cloth over the area and apply firm pressure for thirty minutes  Check the wound ONLY after 30 minutes of direct pressure; do not cheat and sneak a peak, as that does not count  If bleeding persists after 30 minutes of legitimate direct pressure, then try one more round of direct pressure to the area  Should the bleeding become heavier or not stop after the second attempt, call [facility] directly at [phone] (SKIN)  Your call will get routed to the dermatology surgeon on call even after hours

## 2022-12-19 NOTE — PROGRESS NOTES
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       Past Medical History:   Diagnosis Date   • HTN (hypertension)    • Melanoma of face [facility])        Past Surgical History:   Procedure Laterality Date   • HAND SURGERY Right 2021   • HYSTERECTOMY     • IR AORTAGRAM WITH RUN-OFF  11/15/2018   • MASTECTOMY     • REPLACEMENT TOTAL KNEE Left    • US GUIDED THYROID BIOPSY  2022         Current Outpatient Medications:   •  acetaminophen (TYLENOL) 325 mg tablet, Take 650 mg by mouth every 6 (six) hours as needed for mild pain, Disp: , Rfl:   •  ciclopirox (LOPROX) 0 77 % cream, APPLY TWICE A DAY TO AFFECTED AREA IN THE GROIN FOR 3 WEEKS, Disp: , Rfl:   •  diphenhydrAMINE (BENADRYL) 25 mg tablet, Take 25 mg by mouth 2 (two) times a day, Disp: , Rfl:   •  Eliquis 5 MG, Take 5 mg by mouth 2 (two) times a day, Disp: , Rfl:   •  glimepiride (AMARYL) 4 mg tablet, Take 4 mg by mouth 2 (two) times a day, Disp: , Rfl:   •  glimepiride (AMARYL) 4 mg tablet, Take 4 mg by mouth every morning before breakfast, Disp: , Rfl:   •  Levemir FlexTouch 100 units/mL injection pen, INJECT 15 UNITS UNDER THE SKIN NIGHTLY, Disp: , Rfl:   •  lovastatin (MEVACOR) 40 MG tablet, , Disp: , Rfl:   •  quinapril (ACCUPRIL) 40 MG tablet, Take 40 mg by mouth 2 (two) times a day  , Disp: , Rfl:   •  ALPRAZolam (XANAX) 0 25 mg tablet, Take 0 25 mg by mouth as needed  (Patient not taking: Reported on 2022), Disp: , Rfl:   •  aspirin (ECOTRIN LOW STRENGTH) 81 mg EC tablet, Take 81 mg by mouth daily (Patient not taking: Reported on 2022), Disp: , Rfl:   •  ibuprofen (MOTRIN) 200 mg tablet, Take by mouth every 6 (six) hours as needed for mild pain (Patient not taking: Reported on 2022), Disp: , Rfl:   •  meloxicam (MOBIC) 7 5 mg tablet, , Disp: , Rfl:   •  metFORMIN (GLUCOPHAGE-XR) 500 mg 24 hr tablet, Take 1 tablet by mouth 2 (two) times a day with meals (Patient not taking: Reported on 12/19/2022), Disp: , Rfl:     Allergies   Allergen Reactions   • Amlodipine GI Intolerance   • Atenolol GI Intolerance   • Clonidine Other (See Comments)     Patient reports she did not tolerate, unspecified side effects     • Conjugated Estrogens Hypertension   • Felodipine GI Intolerance and Dizziness     Per pt   • Furosemide GI Intolerance   • Hydrochlorothiazide W-Triamterene GI Intolerance   • Labetalol GI Intolerance and Dizziness     Per pt     • Losartan      Headache, nausea   • Meperidine    • Metoprolol GI Intolerance   • Morphine And Related      hallucinations  hallucinations   • Neosporin [Neomycin-Bacitracin Zn-Polymyx]      rash  rash   • Spironolactone Other (See Comments)     optical migraine   • Sudafed [Pseudoephedrine]    • Colloidal Oatmeal Rash       Physical Exam:   Vitals:    12/19/22 1024   BP: 160/80   Pulse: 76   Temp: (!) 97 3 °F (36 3 °C)   SpO2: 97%     General: Awake, Alert, Oriented x 3, Mood and affect appropriate  Respiratory: Respirations even and unlabored  Cardiovascular: Peripheral pulses intact; no edema  Musculoskeletal Exam: n/a    Assessment: 0 7 cm x 0 5 cm pink scar  Biopsy confirmed squamous cell carcinoma in situ of the right medial cheek  Plan:  Mohs    MOHS Procedure Timeout    Flowsheet Row Most Recent Value   Timeout: 1050   Patient Identity Verified: Yes   Correct Site Verified: Yes   Correct Procedure Verified: Yes          MOHS Diagnosis/Indication/Location/ID    Flowsheet Row Most Recent Value   Pathology Type Squamous cell carcinoma   Anatomic Site right cheek or buccal area   Indications for MOHS tumor location   MOHS ID [id number]          MOHS Site/Accession/Pre-Post    Flowsheet Row Most Recent Value   Original Site Identified (as submitted by referring clinician) Photo, Referral   Biopsy Accession/Specimen # (as submitted by referring clincian) [id number]   Pre-MOHS Size Length (cm) 0 7   Pre-MOHS Size Width (cm) 0 5   Post-MOHS Size-Length (cm) 1   Post MOHS Size-Width (cm) 0 5   Repair Type Intermediate layered closure   Suture Type Fast absorbing gut, Vicryl   Fast Absorbing Suture Size 5   Vicryl Suture Size 5   Final repair length (cm): 1 8   Anesthetic Used 1% Lidocaine with epinephrine  [3cc with bicarb]          MOHS Tumor Stage 1 Information    Flowsheet Row Most Recent Value   Tissue Sections (blocks) 2   Microscopic Exam Section 1: No tumor identified in section  Microscopic Exam Section 2: No tumor identified in section  Tumor Clear After Stage I? Yes                  Patient identified, procedure verified, site identified and verified  Time out completed  Surgical removal of the lesion discussed with the patient (risks and benefits, including possibility of scarring, infection, recurrence or potential for further treatment)  I have specifically identified the site with the patient  I have discussed the fact that the patient will have a scar after the procedure regardless of granulation or repair with sutures  I have discussed that the repair options can range from granulation in some cases to linear or curvilinear closures to larger flaps or grafts  There are sometimes flaps or grafts used that require multiples stages of surgery and will not be completed today, rather be completed over a series of appointments  I have discussed that occasionally due to location, size or depth of the lesion I may recommend consultation with and transfer of care for further removal or the reconstruction to another provider such as ophthalmology surgery, plastic surgery, ENT surgery, or surgical oncology   There are cases in which other testing such as imaging with MRI or CT scan or testing of lymph nodes is recommended because of the nature/depth/location of tumor seen during the removal  There is a risk of injury to nerves causing temporary or permanent numbness or the inability to move muscles full such as the inability to lift eyebrows  Questions answered and verbal and written consent was obtained  The tumor qualifies for Mohs based on AUC criteria  Dr Aixa Wood served as the surgeon and pathologist during the procedure  With the patient in the supine position and under adequate local anesthesia with 1% lidocaine with epinephrine 1:100,000, the defect was scrubbed with Hibiclens  Sterile drapes were placed from the sterile tray  Because of the location of the surgical defect, an intermediate closure was judged to give the best possible cosmetic and functional result  The edges of the defect were carefully debrided removing any dead or coagulated tissue  Hemostasis was obtained by pinpoint electrocoagulation  Careful planning of removal of redundant tissue at either end of the defect was drawn out so that the suture lines would fall in the optimal orientation with regard to the relaxed skin tension lines  These were then removed with a #15 blade scalpel  The wound was then approximated by a deep layer of buried vertical mattress sutures and the cutaneous margins were approximated and closed by superficial sutures as noted above  Estimated blood loss was less than 5 mL  The patient tolerated the procedure well  The wound was dressed with petrolatum, a non-stick pad, and a compression dressing  Leatha Saba MD served as the surgeon and pathologist during the procedure  Postoperative care: Wound care discussed at length  I urged the patient to call us if any problems or question should arise  Complications: none  Post-op medications: none  Patient condition after procedure: stable    SCCIS cleared with 1 stage of mohs and repaired with 1 8cm closure  Well tolerated  S/R not needed as all dissolving sutures used        Scribe Attestation    I,:  John Brothers am acting as a scribe while in the presence of the attending physician :       I,:  Veronica Cisneros MD personally performed the services described in this documentation    as scribed in my presence :

## 2022-12-22 ENCOUNTER — OFFICE VISIT (OUTPATIENT)
Dept: PODIATRY | Facility: CLINIC | Age: 77
End: 2022-12-22

## 2022-12-22 VITALS
HEART RATE: 80 BPM | SYSTOLIC BLOOD PRESSURE: 140 MMHG | WEIGHT: 196 LBS | HEIGHT: 69 IN | BODY MASS INDEX: 29.03 KG/M2 | DIASTOLIC BLOOD PRESSURE: 82 MMHG

## 2022-12-22 DIAGNOSIS — B35.1 ONYCHOMYCOSIS: ICD-10-CM

## 2022-12-22 DIAGNOSIS — I70.211 ATHEROSCLEROSIS OF NATIVE ARTERY OF RIGHT LOWER EXTREMITY WITH INTERMITTENT CLAUDICATION (HCC): Primary | Chronic | ICD-10-CM

## 2022-12-22 NOTE — PATIENT INSTRUCTIONS
Foot Care for People with Diabetes   WHAT YOU NEED TO KNOW:   Foot care helps protect your feet and prevent foot ulcers or sores  Long-term high blood sugar levels can damage the blood vessels and nerves in your legs and feet  This damage makes it hard to feel pressure, pain, temperature, and touch  You may not be able to feel a cut or sore, or shoes that are too tight  Foot care is needed to prevent serious problems, such as an infection or amputation  Diabetes may cause your toes to become crooked or curved under  These changes may affect the way you walk and can lead to increased pressure on your foot  The pressure can decrease blood flow to your feet  Lack of blood flow increases your risk for a foot ulcer  Do not ignore small problems, such as dry skin or small wounds  These can become life-threatening over time without proper care  DISCHARGE INSTRUCTIONS:   Call your care team provider if:   Your feet become numb, weak, or hard to move  You have pus draining from a sore on your foot  You have a wound on your foot that gets bigger, deeper, or does not heal      You see blisters, cuts, scratches, calluses, or sores on your foot  You have a fever, and your feet become red, warm, and swollen  Your toenails become thick, curled, or yellow  You find it hard to check your feet because your vision is poor  You have questions or concerns about your condition or care  Foot care:   Check your feet each day  Look at your whole foot, including the bottom, and between and under your toes  Check for wounds, corns, and calluses  Use a mirror to see the bottom of your feet  The skin on your feet may be shiny, tight, or darker than normal  Your feet may also be cold and pale  Feel your feet by running your hands along the tops, bottoms, sides, and between your toes  Redness, swelling, and warmth are signs of blood flow problems that can lead to a foot ulcer   Do not try to remove corns or calluses yourself  Wash your feet each day with soap and warm water  Do not use hot water, because this can injure your foot  Dry your feet gently with a towel after you wash them  Dry between and under your toes  Apply lotion or a moisturizer on your dry feet  Ask your care team provider what lotions are best to use  Do not put lotion or moisturizer between your toes  Moisture between your toes could lead to skin breakdown  Cut your toenails correctly  File or cut your toenails straight across  Use a soft brush to clean around your toenails  If your toenails are very thick, you may need to have a care team provider or specialist cut them  Protect your feet  Do not walk barefoot or wear your shoes without socks  Check your shoes for rocks or other objects that can hurt your feet  Wear cotton socks to help keep your feet dry  Wear socks without toe seams, or wear them with the seams inside out  Change your socks each day  Do not wear socks that are dirty or damp  Wear shoes that fit well  Wear shoes that do not rub against any area of your feet  Your shoes should be ½ to ¾ inch (1 to 2 centimeters) longer than your feet  Your shoes should also have extra space around the widest part of your feet  Walking or athletic shoes with laces or straps that adjust are best  Ask your care team provider for help to choose shoes that fit you best  Ask him or her if you need to wear an insert, orthotic, or bandage on your feet  Do not smoke  Smoking can damage your blood vessels and put you at increased risk for foot ulcers  Ask your care team provider for information if you currently smoke and need help to quit  E-cigarettes or smokeless tobacco still contain nicotine  Talk to your care team provider before you use these products  Follow up with your diabetes care team provider or foot specialist as directed: You will need to have your feet checked at least once a year   You may need a foot exam more often if you have nerve damage, foot deformities, or ulcers  Write down your questions so you remember to ask them during your visits  © Copyright [employer] 2022 Information is for End User's use only and may not be sold, redistributed or otherwise used for commercial purposes  All illustrations and images included in CareNotes® are the copyrighted property of [employer]  or [facility] Caron Hansen   The above information is an  only  It is not intended as medical advice for individual conditions or treatments  Talk to your doctor, nurse or pharmacist before following any medical regimen to see if it is safe and effective for you

## 2022-12-22 NOTE — PROGRESS NOTES
Date Patient Seen: 12/22/22       Subjective:     Chief Complaint:  High risk footcare     Patient ID: Lamberto Lee is a 64 y o  female  Kwan Velasquez is here for high risk foot care  She has a longstanding history of PAD  [facility]  Pt denies any numbness, tingling, or  coldness   Pt denies any open wounds                The following portions of the patient's history were reviewed and updated as appropriate: allergies, current medications, past family history, past medical history, past social history, past surgical history and problem list     Review of Systems   Constitutional: Negative  Respiratory: Negative  Cardiovascular: Negative  Gastrointestinal: Negative  Musculoskeletal: Positive for gait problem  She is gets calf cramps after walking one block Right  Hematological: Negative  Psychiatric/Behavioral: Negative  Objective:      /82   Pulse 80   Ht 5' 9" (1 753 m)   Wt 88 9 kg (196 lb)   BMI 28 94 kg/m²        Physical Exam  Vitals and nursing note reviewed  Constitutional:       General: She is not in acute distress  Appearance: Normal appearance  She is not ill-appearing, toxic-appearing or diaphoretic  HENT:      Head: Normocephalic  Cardiovascular:      Rate and Rhythm: Normal rate  Pulses:           Dorsalis pedis pulses are 0 on the right side and 1+ on the left side  Posterior tibial pulses are 0 on the right side and 1+ on the left side  Pulmonary:      Effort: Pulmonary effort is normal    Abdominal:      General: Bowel sounds are normal    Musculoskeletal:        Feet:    Feet:      Right foot:      Protective Sensation: 10 sites tested  10 sites sensed  Left foot:      Protective Sensation: 10 sites tested  10 sites sensed  Skin:     Comments: Nails yellow-brown, 5mm thick, dystrophic, with crumbling subugunal debris x 10  Neurological:      General: No focal deficit present  Mental Status: She is alert  Psychiatric:         Mood and Affect: Mood normal             Diagnoses and all orders for this visit:    Atherosclerosis of native artery of right lower extremity with intermittent claudication (HCC)    Onychomycosis         Assessment:  Onychomycosis  PAD    Plan:  Foot precautions reviewed with patient including the need to wear protective shoegear at all times when walking including in the home, the need to check feet all surfaces daily with a mirror and report and skin breaks to podiatry, the need to apply an emollient to skin of feet daily  Nails x 10 were debrided with double-action nail forceps of their thickness, length and width

## 2023-01-03 ENCOUNTER — TELEPHONE (OUTPATIENT)
Dept: DERMATOLOGY | Facility: CLINIC | Age: 78
End: 2023-01-03

## 2023-01-03 NOTE — TELEPHONE ENCOUNTER
Bryon Salcedo from [facility] dermatology called to have records from this patient's Mohs surgery faxed to their office  Faxed as requested

## 2023-01-16 ENCOUNTER — APPOINTMENT (OUTPATIENT)
Dept: LAB | Facility: MEDICAL CENTER | Age: 78
End: 2023-01-16

## 2023-01-16 DIAGNOSIS — K92.2 GASTROINTESTINAL HEMORRHAGE, UNSPECIFIED GASTROINTESTINAL HEMORRHAGE TYPE: ICD-10-CM

## 2023-01-16 LAB
ERYTHROCYTE [DISTWIDTH] IN BLOOD BY AUTOMATED COUNT: 12.7 % (ref 11.6–15.1)
HCT VFR BLD AUTO: 46.6 % (ref 34.8–46.1)
HGB BLD-MCNC: 15 G/DL (ref 11.5–15.4)
MCH RBC QN AUTO: 30.6 PG (ref 26.8–34.3)
MCHC RBC AUTO-ENTMCNC: 32.2 G/DL (ref 31.4–37.4)
MCV RBC AUTO: 95 FL (ref 82–98)
PLATELET # BLD AUTO: 231 THOUSANDS/UL (ref 149–390)
PMV BLD AUTO: 12 FL (ref 8.9–12.7)
RBC # BLD AUTO: 4.9 MILLION/UL (ref 3.81–5.12)
WBC # BLD AUTO: 8.76 THOUSAND/UL (ref 4.31–10.16)

## 2023-03-06 ENCOUNTER — APPOINTMENT (OUTPATIENT)
Dept: RADIOLOGY | Facility: MEDICAL CENTER | Age: 78
End: 2023-03-06

## 2023-03-06 DIAGNOSIS — M84.364A STRESS FRACTURE, LEFT FIBULA, INITIAL ENCOUNTER FOR FRACTURE: ICD-10-CM

## 2023-03-08 ENCOUNTER — CONSULT (OUTPATIENT)
Dept: PODIATRY | Facility: CLINIC | Age: 78
End: 2023-03-08

## 2023-03-08 VITALS
HEART RATE: 80 BPM | DIASTOLIC BLOOD PRESSURE: 80 MMHG | SYSTOLIC BLOOD PRESSURE: 140 MMHG | WEIGHT: 196 LBS | BODY MASS INDEX: 29.03 KG/M2 | HEIGHT: 69 IN

## 2023-03-08 DIAGNOSIS — M25.572 ACUTE LEFT ANKLE PAIN: ICD-10-CM

## 2023-03-08 DIAGNOSIS — S93.492A SPRAIN OF ANTERIOR TALOFIBULAR LIGAMENT OF LEFT ANKLE, INITIAL ENCOUNTER: Primary | ICD-10-CM

## 2023-03-08 RX ORDER — RAMIPRIL 5 MG/1
5 CAPSULE ORAL DAILY
COMMUNITY
Start: 2023-02-09

## 2023-03-08 RX ORDER — PANTOPRAZOLE SODIUM 20 MG/1
20 TABLET, DELAYED RELEASE ORAL DAILY
COMMUNITY
Start: 2023-01-13

## 2023-03-08 RX ORDER — LINAGLIPTIN 5 MG/1
5 TABLET, FILM COATED ORAL
COMMUNITY
Start: 2023-02-03

## 2023-03-08 RX ORDER — BENZONATATE 100 MG/1
100 CAPSULE ORAL 3 TIMES DAILY PRN
COMMUNITY
Start: 2023-03-04 | End: 2023-03-11

## 2023-03-08 RX ORDER — BLOOD SUGAR DIAGNOSTIC
STRIP MISCELLANEOUS
COMMUNITY
Start: 2023-02-24

## 2023-03-08 RX ORDER — LISINOPRIL 40 MG/1
40 TABLET ORAL DAILY
COMMUNITY
Start: 2023-01-13

## 2023-03-08 RX ORDER — PANTOPRAZOLE SODIUM 20 MG/1
20 TABLET, DELAYED RELEASE ORAL DAILY
COMMUNITY
Start: 2023-01-13 | End: 2023-04-13

## 2023-03-08 RX ORDER — RAMIPRIL 5 MG/1
5 CAPSULE ORAL DAILY
COMMUNITY
Start: 2023-02-09 | End: 2024-02-09

## 2023-03-08 RX ORDER — DOXYCYCLINE 100 MG/1
TABLET ORAL
COMMUNITY
Start: 2023-03-01

## 2023-03-08 RX ORDER — GUAIFENESIN 200 MG/10ML
200 LIQUID ORAL 3 TIMES DAILY PRN
COMMUNITY

## 2023-03-08 RX ORDER — POLYETHYLENE GLYCOL-3350 AND ELECTROLYTES 236; 6.74; 5.86; 2.97; 22.74 G/274.31G; G/274.31G; G/274.31G; G/274.31G; G/274.31G
POWDER, FOR SOLUTION ORAL
COMMUNITY
Start: 2023-01-13

## 2023-03-08 RX ORDER — PANTOPRAZOLE SODIUM 40 MG/1
TABLET, DELAYED RELEASE ORAL
COMMUNITY

## 2023-03-08 RX ORDER — LINAGLIPTIN 5 MG/1
5 TABLET, FILM COATED ORAL
COMMUNITY
Start: 2023-02-03 | End: 2024-02-08

## 2023-03-08 RX ORDER — AZITHROMYCIN 250 MG/1
TABLET, FILM COATED ORAL
COMMUNITY
Start: 2023-02-27

## 2023-03-08 RX ORDER — GUAIFENESIN DEXTROMETHORPHAN HYDROBROMIDE ORAL SOLUTION 10; 100 MG/5ML; MG/5ML
SOLUTION ORAL
COMMUNITY
Start: 2023-02-27

## 2023-03-08 RX ORDER — VALSARTAN 80 MG/1
80 TABLET ORAL DAILY
COMMUNITY
Start: 2022-12-27

## 2023-03-08 NOTE — PROGRESS NOTES
Assessment/Plan:       Diagnoses and all orders for this visit:    Sprain of anterior talofibular ligament of left ankle, initial encounter  -     Ambulatory referral to Physical Therapy; Future    Acute left ankle pain  -     Ambulatory referral to Physical Therapy; Future    Other orders  -     valsartan (DIOVAN) 80 mg tablet; Take 80 mg by mouth daily  -     ramipril (ALTACE) 5 mg capsule; Take 5 mg by mouth daily  -     ramipril (ALTACE) 5 mg capsule; Take 5 mg by mouth daily  -     GaviLyte-G 236 g solution; 4000 MILLILITER AS DIRECTED, FOLLOW INSTRUCTIONS PROVIDED BY OFFICE  -     pantoprazole (PROTONIX) 40 mg tablet; Take by mouth  -     pantoprazole (PROTONIX) 20 mg tablet; Take 20 mg by mouth daily  -     pantoprazole (PROTONIX) 20 mg tablet; Take 20 mg by mouth daily  -     lisinopril (ZESTRIL) 40 mg tablet; Take 40 mg by mouth daily  -     Tradjenta 5 MG TABS; Take 5 mg by mouth daily with breakfast  -     linaGLIPtin (Tradjenta) 5 MG TABS; Take 5 mg by mouth  -     guaiFENesin (ROBITUSSIN) 100 MG/5ML oral liquid; Take 200 mg by mouth Three times daily as needed  -     OneTouch Verio test strip; USE ONE TEST STRIP 1-2X DAILY    MEDICARE ONLY PAYS FOR ONCE DAILY TESTING  -     doxycycline (ADOXA) 100 MG tablet; TAKE 1 TABLET BY MOUTH 2 TIMES A DAY FOR 5 DAYS  -     dextromethorphan-guaiFENesin (ROBITUSSIN-DM)  mg/5 mL oral liquid; Take by mouth  -     benzonatate (TESSALON PERLES) 100 mg capsule; Take 100 mg by mouth Three times daily as needed  -     azithromycin (ZITHROMAX) 250 mg tablet; TAKE 2 TABLETS BY MOUTH TODAY, THEN TAKE 1 TABLET DAILY FOR 4 DAYS      Diagnosis and options discussed with patient  Patient agreeable to the plan as stated below    PAtient has acute left ankle pain  Differential diagnosis involves acute gout vs sprain of ATFL  I offered a joint aspirate but it was politely declined and at the moment not necessary   I did not offer oral steroid as she is poorly controlled Diabetic  She also has CKD3 so I did not prescribe NSaids  Patient seemed unaware that she has Diabetes or CKD  She seemed upset that I brought this up despite taking medications for both these conditions  RICE protocol  Cam boot for 2-3 weeks  PHysical therapy  She has appt with her doctor in 3 weeks for followup  Reviewed XR  There is chronic changes to lateral talus but these do not appear acute     She can get routine nail care at [facility] with Dr Dimitri Mckeon or Levi Rivera    Subjective:      Patient ID: Saul Sesay is a 76 y o  female  Patient was hospitalized last week for PNA  A few days after the infection she woke up and her left heel was very painful  There was some bruising  Her PCP sent her for XR, there aren't any broken bones  The following portions of the patient's history were reviewed and updated as appropriate: allergies, current medications, past family history, past medical history, past social history, past surgical history and problem list     Review of Systems    Constitutional: Negative  Respiratory: Negative for cough and shortness of breath  Gastrointestinal: Negative for diarrhea, nausea and vomiting  Musculoskeletal: acute ankle pain  Skin: Negative for rash or wound  Neurological: Negative for weakness, numbness and headaches  Objective:      /80   Pulse 80   Ht 5' 9" (1 753 m)   Wt 88 9 kg (196 lb)   BMI 28 94 kg/m²          Physical Exam  Vitals reviewed  Constitutional:       Appearance: She is obese  She is not ill-appearing or diaphoretic  Cardiovascular:      Rate and Rhythm: Normal rate  Pulmonary:      Effort: Pulmonary effort is normal  No respiratory distress  Musculoskeletal:         General: Swelling and tenderness present  Right ankle: Swelling and ecchymosis present  No deformity or lacerations  Tenderness present over the lateral malleolus, ATF ligament and CF ligament  No proximal fibula tenderness   Normal range of motion  Anterior drawer test negative  Right Achilles Tendon: No tenderness or defects  Amezcua's test negative  Skin:     Findings: Bruising present  XRay 3 views of the left foot and ankle personally read by Dr Dominick Herrera in office today and discussed with patient:  1  Degenerative changes lateral talus appear chronic  2  No acute fractures noted  3  No acute findings

## 2023-03-08 NOTE — PATIENT INSTRUCTIONS
Ankle Exercises   AMBULATORY CARE:   What you need to know about ankle exercises: Ankle exercises help strengthen your ankle and improve its function after injury  These are beginning exercises  Ask your healthcare provider if you need to see a physical therapist for more advanced exercises  General guidelines for ankle exercises:   Do these exercises 3 to 5 days a week, or as directed by your healthcare provider  Ask if you should do the exercises on each ankle  Do the exercises in the order that your healthcare provider recommends  This will help prevent swelling, chronic pain, and reinjury  Start with range of motion exercises  Then move to strengthening exercises, and finally to balancing exercises  Warm up before you do ankle exercises  Walk or ride a stationary bike for 5 to 10 minutes to prepare your ankle for movement  Stop if you feel pain  It is normal to feel some discomfort at first but you should not feel pain  Tell your doctor or physical therapist if you have pain while you exercise  Regular exercise will help decrease your discomfort over time  How to perform range of motion exercises safely:  Begin with range of motion exercises to improve flexibility  Ask your healthcare provider when you can progress to strengthening exercises  Ankle alphabet:  Sit on a chair so that your feet do not touch the floor  Use your big toe to write each letter of the alphabet  Use only your foot and ankle, and keep your movements small  Do 2 sets  Calf stretches:      Sitting calf stretches with a towel:  Sit on the floor with both legs out straight in front of you  Loop a towel around the ball of your injured foot  Grasp the ends of the towel and pull it toward you  Keep your leg and back straight  Do not lean forward as you pull the towel  Hold for 30 seconds  Then relax for 30 seconds  Do 2 sets of 10           Standing calf stretches:  Stand facing a wall with the foot that is not injured forward and your knee slightly bent  Keep the leg with the injured foot straight and behind you with your toes pointed in slightly  With both heels flat on the floor, press your hips forward  Do not arch your back  Hold for 30 seconds, and then relax for 30 seconds  Do 2 sets of 10  Repeat with your leg bent  Do 2 sets of 10  How to perform strengthening exercises safely:  After you can perform range of motion exercises without pain, you may begin strengthening exercises  Ask your healthcare provider when you can progress to balancing exercises  Ankle movement in 4 directions:  Sit on the floor with your legs straight in front of you  Keep your heels on the floor for support  Dorsiflexion:  Begin with your toes pointing straight up  Pull your toes toward your body  Slowly return to the starting position  Do 3 sets of 5  Plantar flexion:  Begin with your toes pointing straight up  Push your toes away from your body  Slowly return to the starting position  Do 3 sets of 5  Inversion:  Begin with your toes pointing straight up  Push your toes inward, toward each other  Slowly return to the starting position  Do 3 sets of 5  Eversion:  Begin with your toes pointing straight up  Push your toes outward, away from each other  Slowly return to the starting position  Do 3 sets of 5  Toe curls with a towel:  Sit on a chair so that both of your feet are flat on the floor  Place a small towel on the floor in front of your injured foot  Grab the center of the towel with your toes and curl the towel toward you  Relax and repeat  Do 1 set of 5  [place] pick-ups:  Sit on a chair so that both of your feet are flat on the floor  Place 20 marbles on the floor in front of your injured foot  Use your toes to  one marble at a time and place it into a bowl  Repeat until you have picked up all the marbles  Do 1 set       Heel raises:      Single leg heel raises:  Stand with your weight evenly on both feet  Hold on to a chair or a wall for balance  Lift the foot that is not injured off the floor so all your weight is placed on your injured foot  Raise the heel of your injured foot as high as you can  Slowly lower your heel to the floor  Do 1 set of 10  Double leg heel raises:  Stand with your weight evenly on both feet  Hold on to a chair or a wall for balance  Raise both of your heels as high as you can  Slowly lower your heels to the floor  Do 1 set of 10  Heel and toe walks:      Heel walks:  Begin in a standing position  Lift your toes off the floor and walk on your heels  Keep your toes lifted as high as possible  Do 2 sets of 10  Toe walks:  Begin in a standing position  Lift your heels off the floor and walk on the balls and toes of your feet  Keep your heels lifted as high as possible  Do 2 sets of 10  How to perform a balance exercise safely:  After you can perform strengthening exercises without pain, you may do this beginning balancing exercise  Ask your healthcare provider for more advanced balance exercises  Single leg stance:  Stand with your weight evenly on both feet, or hold on to a chair or a wall  Do not lean to the side  Lift the foot that is not injured off the floor so all your weight is placed on your injured foot  Balance on your injured foot  Ask your healthcare provider how long to hold this position  Call your doctor or physical therapist if:   You have new pain, or your pain becomes worse  You have questions or concerns about your condition, care, or exercise program     © Copyright Ansh[employer] 2022 Information is for End User's use only and may not be sold, redistributed or otherwise used for commercial purposes  The above information is an  only  It is not intended as medical advice for individual conditions or treatments   Talk to your doctor, nurse or pharmacist before following any medical regimen to see if it is safe and effective for you

## 2023-03-17 ENCOUNTER — EVALUATION (OUTPATIENT)
Dept: PHYSICAL THERAPY | Facility: MEDICAL CENTER | Age: 78
End: 2023-03-17

## 2023-03-17 DIAGNOSIS — S93.492D SPRAIN OF ANTERIOR TALOFIBULAR LIGAMENT OF LEFT ANKLE, SUBSEQUENT ENCOUNTER: Primary | ICD-10-CM

## 2023-03-17 DIAGNOSIS — M25.572 ACUTE LEFT ANKLE PAIN: ICD-10-CM

## 2023-03-17 DIAGNOSIS — S93.492A SPRAIN OF ANTERIOR TALOFIBULAR LIGAMENT OF LEFT ANKLE, INITIAL ENCOUNTER: ICD-10-CM

## 2023-03-17 NOTE — PROGRESS NOTES
PT Evaluation     Today's date: 3/17/2023  Patient name: Fiona Healy  : 1945  MRN: [id number]  Referring provider: ZAMZAM Case  Dx:   Encounter Diagnosis     ICD-10-CM    1  Sprain of anterior talofibular ligament of left ankle, subsequent encounter  S93 492D                      Assessment  Assessment details: Pt is an alert and oriented 67yo female, refferred to out-pt PT with dx of L ankle sprain  Pt was hospitalized for pulmonary infection a couple of weeks ago, and since developed L ankle pain  Pt woke up 2 weeks ago and her ankle was "swollen like a grapefruit"  X-rays were normal, and physician wants to assess if sx';s are from an ankle injury versus gout  Pt was given a CAM boot and advised to use for 2-3 weeks  Pt states she only has worn the CAM boot twice, and does feel decreased pain and swelling in her ankle  Pt denies any injury/trauma  Upon examination, pt demonstrates min pain/tissue tenderness, balance deficits in CAM boot,  L ankle strength and ROM WFL's, gait deficits and functional limitations  Pt and her  concerned about high co-pay and her sx's resolving  Pt provided with HEP and instructed to f/u as needed  Thank you for your referral   Impairments: abnormal gait, abnormal or restricted ROM, activity intolerance, impaired balance, impaired physical strength, lacks appropriate home exercise program, pain with function and weight-bearing intolerance  Understanding of Dx/Px/POC: good   Prognosis: good    Goals  ST: Decrease pain by 25% in 4 weeks with all functional activities  2: Improved ROM by 25% in 4 weeks to assist with all functional activities  3: Improve strength by 1/2 grade in 4 weeks to assist with all functional activities  LT:  Decrease pain to 0-1/10 with all functional activities in 4-6 weeks  2: Improved ROM to WFL's in 4-6 weeks to assist with all functional activities  3:  Increase strength to WFL's in 4-6 weeks to assist with all functional activities    Plan  Patient would benefit from: skilled physical therapy  Planned modality interventions: cryotherapy  Planned therapy interventions: joint mobilization, manual therapy, neuromuscular re-education, patient education, postural training, strengthening, stretching, balance/weight bearing training, therapeutic exercise, therapeutic training, therapeutic activities and home exercise program  Frequency: 2x week  Duration in weeks: 6  Plan of Care beginning date: 3/17/2023  Plan of Care expiration date: 2023  Treatment plan discussed with: patient        Subjective Evaluation    Pain  Current pain ratin  At best pain ratin  At worst pain rating: 10  Location: L ankle  Quality: dull ache  Relieving factors: ice  Aggravating factors: walking and standing    Social Support  Steps to enter house: yes  Stairs in house: no   Lives in: [place] house  Lives with: spouse      Diagnostic Tests  X-ray: normal  Treatments  Current treatment: physical therapy  Patient Goals  Patient goals for therapy: increased strength, independence with ADLs/IADLs, increased motion, decreased pain and improved balance          Objective     Observations     Additional Observation Details  Observation:   Mild lat ankle swelling, purple discoloration R foot, dry skin L LE    Gait:  Pt amb ind with LOB at times wearing CAM boot L LE, decreased stance time and step length on L  Palpation     Additional Palpation Details  Palpation:  Pain/tenderness R ATFL region    Neurological Testing     Sensation     Ankle/Foot   Left Ankle/Foot   Intact: light touch    Active Range of Motion   Left Ankle/Foot   Dorsiflexion (ke): 6 degrees with pain  Plantar flexion: 33 degrees   Inversion: 28 degrees   Eversion: 12 degrees     Strength/Myotome Testing     Left Ankle/Foot   Dorsiflexion: 4  Plantar flexion: 4  Inversion: 4  Eversion: 4    Tests   Left Ankle/Foot   Negative for anterior drawer       Swelling   Left Ankle/Foot   Figure 8: 51 5 cm  Malleoli: 29 cm             Precautions: CAD, h/o melanoma, DM, HTN, CKD      Manuals 3/17                                                                Neuro Re-Ed                                                                                                        Ther Ex                                                                                                                     Ther Activity                                       Gait Training                                       Modalities

## 2023-03-23 ENCOUNTER — OFFICE VISIT (OUTPATIENT)
Dept: PODIATRY | Facility: CLINIC | Age: 78
End: 2023-03-23

## 2023-03-23 VITALS
HEIGHT: 69 IN | SYSTOLIC BLOOD PRESSURE: 150 MMHG | HEART RATE: 76 BPM | BODY MASS INDEX: 28.08 KG/M2 | DIASTOLIC BLOOD PRESSURE: 88 MMHG | WEIGHT: 189.6 LBS

## 2023-03-23 DIAGNOSIS — E11.51 DIABETES TYPE 2 WITH ATHEROSCLEROSIS OF ARTERIES OF EXTREMITIES (HCC): Primary | ICD-10-CM

## 2023-03-23 DIAGNOSIS — B35.1 ONYCHOMYCOSIS: ICD-10-CM

## 2023-03-23 DIAGNOSIS — I70.209 DIABETES TYPE 2 WITH ATHEROSCLEROSIS OF ARTERIES OF EXTREMITIES (HCC): Primary | ICD-10-CM

## 2023-03-23 NOTE — PROGRESS NOTES
Date Patient Seen: 3/23/23       Subjective:     Chief Complaint:  Diabetic foot care  S/P ankle sprain     Patient ID: Pancho Garibay is a 76 y o  female        Erick Escalera is here for her regularly scheduled diabetic foot care  Also, she did recently have an ankle sprain  I did review Dr Jun Giraldo note from that incident  She did go to PT and now notes complete resolution of edema, pain and has full function without restriction of the ankle  The following portions of the patient's history were reviewed and updated as appropriate: allergies, current medications, past family history, past medical history, past social history, past surgical history and problem list     Review of Systems   Constitutional: Negative  Respiratory: Negative  Cardiovascular: Negative  Gastrointestinal: Negative  Musculoskeletal: Positive for gait problem  She is gets calf cramps after walking one block Right  Hematological: Negative  Psychiatric/Behavioral: Negative  Objective:      /88   Pulse 76   Ht 5' 9" (1 753 m) Comment: verbal  Wt 86 kg (189 lb 9 6 oz)   BMI 28 00 kg/m²        Physical Exam  Vitals reviewed  Constitutional:       Appearance: She is obese  She is not ill-appearing or diaphoretic  Cardiovascular:      Rate and Rhythm: Normal rate  Pulses: Pulses are weak  Dorsalis pedis pulses are 0 on the right side and 0 on the left side  Posterior tibial pulses are 0 on the right side and 0 on the left side  Pulmonary:      Effort: Pulmonary effort is normal  No respiratory distress  Musculoskeletal:         General: Swelling present  No tenderness  Right ankle: Swelling and ecchymosis present  No deformity or lacerations  No proximal fibula tenderness  Normal range of motion  Anterior drawer test negative  Right Achilles Tendon: No tenderness or defects  Amezcua's test negative        Comments: Very minimal swelling noted at the lateral ankle  No blistering, bruising  No pain on palpation of the ATFL, CFL  No pain on calf squeeze  ROM of the ankle is WNL  No laxity noted on anterior drawer  Feet:      Right foot:      Skin integrity: No ulcer, skin breakdown, erythema, warmth, callus or dry skin  Left foot:      Skin integrity: No ulcer, skin breakdown, erythema, warmth, callus or dry skin  Skin:     Findings: No bruising  Diabetic Foot Exam    Patient's shoes and socks removed  Right Foot/Ankle   Right Foot Inspection  Skin Exam: skin normal and skin intact  No dry skin, no warmth, no callus, no erythema, no maceration, no abnormal color, no pre-ulcer, no ulcer and no callus  Toe Exam: ROM and strength within normal limits  Sensory   Vibration: intact  Proprioception: intact  Monofilament testing: intact    Vascular  Capillary refills: < 3 seconds  The right DP pulse is 0  The right PT pulse is 0  Left Foot/Ankle  Left Foot Inspection  Skin Exam: skin normal and skin intact  No dry skin, no warmth, no erythema, no maceration, normal color, no pre-ulcer, no ulcer and no callus  Toe Exam: ROM and strength within normal limits  Sensory   Vibration: intact  Proprioception: intact  Monofilament testing: intact    Vascular  Capillary refills: < 3 seconds  The left DP pulse is 0  The left PT pulse is 0  Assign Risk Category  No deformity present  No loss of protective sensation  Weak pulses  Risk: 0     There are no diagnoses linked to this encounter  Assessment:  Diabetes with PAD  Resolved ankle sprain    Plan:  No further care needed for the ankle sprain  Foot precautions reviewed with patient including the need to wear protective shoegear at all times when walking including in the home, the need to check feet all surfaces daily with a mirror and report and skin breaks to podiatry, the need to apply an emollient to skin of feet daily  Nails x 10 were debrided with double-action nail forceps of their thickness, length and width

## 2023-05-04 ENCOUNTER — APPOINTMENT (OUTPATIENT)
Dept: LAB | Facility: MEDICAL CENTER | Age: 78
End: 2023-05-04

## 2023-05-04 DIAGNOSIS — R73.09 BLOOD GLUCOSE LABILE: ICD-10-CM

## 2023-05-04 DIAGNOSIS — R73.09 HEMOGLOBIN A1C 8.0 PERCENT OR GREATER: ICD-10-CM

## 2023-05-04 DIAGNOSIS — E11.65 UNCONTROLLED TYPE 2 DIABETES MELLITUS WITH HYPERGLYCEMIA (HCC): ICD-10-CM

## 2023-05-04 DIAGNOSIS — N18.30 STAGE 3 CHRONIC KIDNEY DISEASE, UNSPECIFIED WHETHER STAGE 3A OR 3B CKD (HCC): ICD-10-CM

## 2023-05-04 LAB
ALBUMIN SERPL BCP-MCNC: 3.8 G/DL (ref 3.5–5)
ALP SERPL-CCNC: 81 U/L (ref 46–116)
ALT SERPL W P-5'-P-CCNC: 34 U/L (ref 12–78)
ANION GAP SERPL CALCULATED.3IONS-SCNC: 4 MMOL/L (ref 4–13)
AST SERPL W P-5'-P-CCNC: 20 U/L (ref 5–45)
BILIRUB SERPL-MCNC: 0.48 MG/DL (ref 0.2–1)
BUN SERPL-MCNC: 23 MG/DL (ref 5–25)
CALCIUM SERPL-MCNC: 9.4 MG/DL (ref 8.3–10.1)
CHLORIDE SERPL-SCNC: 112 MMOL/L (ref 96–108)
CO2 SERPL-SCNC: 26 MMOL/L (ref 21–32)
CREAT SERPL-MCNC: 1.26 MG/DL (ref 0.6–1.3)
GFR SERPL CREATININE-BSD FRML MDRD: 40 ML/MIN/1.73SQ M
GLUCOSE P FAST SERPL-MCNC: 125 MG/DL (ref 65–99)
POTASSIUM SERPL-SCNC: 4.6 MMOL/L (ref 3.5–5.3)
PROT SERPL-MCNC: 7.2 G/DL (ref 6.4–8.4)
SODIUM SERPL-SCNC: 142 MMOL/L (ref 135–147)

## 2023-05-12 ENCOUNTER — OFFICE VISIT (OUTPATIENT)
Dept: SURGICAL ONCOLOGY | Facility: CLINIC | Age: 78
End: 2023-05-12

## 2023-05-12 VITALS
BODY MASS INDEX: 27.99 KG/M2 | TEMPERATURE: 97.3 F | OXYGEN SATURATION: 97 % | HEART RATE: 78 BPM | HEIGHT: 69 IN | SYSTOLIC BLOOD PRESSURE: 138 MMHG | DIASTOLIC BLOOD PRESSURE: 86 MMHG | RESPIRATION RATE: 18 BRPM | WEIGHT: 189 LBS

## 2023-05-12 DIAGNOSIS — C43.59 MALIGNANT MELANOMA OF TORSO EXCLUDING BREAST (HCC): Primary | ICD-10-CM

## 2023-05-12 DIAGNOSIS — E04.1 THYROID NODULE: ICD-10-CM

## 2023-05-12 NOTE — PROGRESS NOTES
Surgical Oncology Follow Up       [address]  [facility] [place]  [address]  [facility] [address]    [facility] November 1945  [id number]  [id number]  [facility]  [facility] [place]  [address]  [place] [address]    Diagnoses and all orders for this visit:    Malignant melanoma of torso excluding breast ([address] )    Thyroid nodule  -     US thyroid; Future        Chief Complaint   Patient presents with   • Follow-up       Return in about 1 year (around 5/12/2024) for Office Visit, with Kathy  Oncology History   Melanoma ([address] )   3/18/2021 Initial Diagnosis    Melanoma ([facility] )     3/18/2021 Biopsy    Left upper back shave biopsy:  Malignant melanoma   - at least 0 4 mm thickness   - 0 mitosis   - negative ulceration     4/20/2021 Surgery    In office wide excision  A  Skin, left upper back, excision:  - Residual melanoma in-situ; prior biopsy site reaction   - Atypical lentiginous compound nevus  - Inked margins with no tumor seen         Staging: [id number]  Melanoma of the left upper back, April 2021  Treatment history:  Wide excision left upper back melanoma, April 2021  Left thyroid nodule biopsy, July 2022, [facility] [place] III: Afirma was benign  Current treatment:  observation  Disease status: ANDRA    History of Present Illness: Patient returns in follow-up of her melanoma  She is doing well  She denies any dysphagia or hoarseness  No abdominal pain, nausea or vomiting  No back pain, bone pain, headaches, or cough  She does see her dermatologist every 6 months, and has 2 lesions on her back that are being closely monitored with photographs  Review of Systems  Complete ROS Surg Onc:   Complete ROS Surg Onc:   Constitutional: The patient denies new or recent history of general fatigue, no recent weight loss, no change in appetite  Eyes: No complaints of visual problems, no scleral icterus     ENT: no complaints of ear pain, no hoarseness, no difficulty swallowing,  no tinnitus and no new masses in head, oral cavity, or neck  Cardiovascular: No complaints of chest pain, no palpitations, no ankle edema  Respiratory: No complaints of shortness of breath, no cough  Gastrointestinal: No complaints of jaundice, no bloody stools, no pale stools  Genitourinary: No complaints of dysuria, no hematuria, no nocturia, no frequent urination, no urethral discharge  Musculoskeletal: No complaints of weakness, paralysis, joint stiffness or arthralgias  Integumentary: No complaints of rash, no new lesions  Neurological: No complaints of convulsions, no seizures, no dizziness  Hematologic/Lymphatic: No complaints of easy bruising  Endocrine:  No hot or cold intolerance  No polydipsia, polyphagia, or polyuria  Allergy/immunology:  No environmental allergies  No food allergies  Not immunocompromised  Skin:  No pallor or rash  No wound          Patient Active Problem List   Diagnosis   • Toe cyanosis   • 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)      Past Surgical History:   Procedure Laterality Date   • HAND SURGERY Right 09/16/2021   • HYSTERECTOMY     • IR AORTAGRAM WITH RUN-OFF  11/15/2018   • MASTECTOMY     • REPLACEMENT TOTAL KNEE Left    • US GUIDED THYROID BIOPSY  7/7/2022     Family History   Problem Relation Age of Onset   • Diabetes Mother    • Hypertension Mother    • Atrial fibrillation Father      Social History     Socioeconomic History   • Marital status: /Civil Union     Spouse name: Not on file   • Number of children: Not on file   • Years of education: Not on file   • Highest education level: Not on file   Occupational History   • Not on file   Tobacco Use   • Smoking status: Never   • Smokeless tobacco: Never   Vaping Use   • Vaping Use: Never used   Substance and Sexual Activity   • Alcohol use: No   • Drug use: No   • Sexual activity: Not on file   Other Topics Concern   • Not on file   Social History Narrative   • Not on file     Social Determinants of Health     Financial Resource Strain: Not on file   Food Insecurity: Not on file   Transportation Needs: Not on file   Physical Activity: Not on file   Stress: Not on file   Social Connections: Not on file   Intimate Partner Violence: Not on file   Housing Stability: Not on file       Current Outpatient Medications:   •  acetaminophen (TYLENOL) 325 mg tablet, Take 650 mg by mouth every 6 (six) hours as needed for mild pain, Disp: , Rfl:   •  ALPRAZolam (XANAX) 0 25 mg tablet, Take 0 25 mg by mouth as needed, Disp: , Rfl:   •  aspirin (ECOTRIN LOW STRENGTH) 81 mg EC tablet, Take 81 mg by mouth daily, Disp: , Rfl:   •  diphenhydrAMINE (BENADRYL) 25 mg tablet, Take 25 mg by mouth 2 (two) times a day, Disp: , Rfl:   •  Eliquis 5 MG, Take 5 mg by mouth 2 (two) times a day, Disp: , Rfl:   •  glimepiride (AMARYL) 4 mg tablet, Take 4 mg by mouth 2 (two) times a day, Disp: , Rfl:   •  Levemir FlexTouch 100 units/mL injection pen, INJECT 15 UNITS UNDER THE SKIN NIGHTLY, Disp: , Rfl:   •  lovastatin (MEVACOR) 40 MG tablet, , Disp: , Rfl:   •  OneTouch Verio test strip, USE ONE TEST STRIP 1-2X DAILY    MEDICARE ONLY PAYS FOR ONCE DAILY TESTING, Disp: , Rfl:   •  ramipril (ALTACE) 5 mg capsule, Take 5 mg by mouth daily, Disp: , Rfl:   •  Tradjenta 5 MG TABS, Take 5 mg by mouth daily with breakfast, Disp: , Rfl:   •  azithromycin (ZITHROMAX) 250 mg tablet, TAKE 2 TABLETS BY MOUTH TODAY, THEN TAKE 1 TABLET DAILY FOR 4 DAYS (Patient not taking: Reported on 3/23/2023), Disp: , Rfl:   •  ciclopirox (LOPROX) 0 77 % cream, APPLY TWICE A DAY TO AFFECTED AREA IN THE GROIN FOR 3 WEEKS (Patient not taking: Reported on 5/12/2023), Disp: , Rfl:   •  dextromethorphan-guaiFENesin (ROBITUSSIN-DM)  mg/5 mL oral liquid, Take by mouth (Patient not taking: Reported on 3/23/2023), Disp: , Rfl:   •  doxycycline (ADOXA) 100 MG tablet, TAKE 1 TABLET BY MOUTH 2 TIMES A DAY FOR 5 DAYS  (Patient not taking: Reported on 5/12/2023), Disp: , Rfl:   •  GaviLyte-G 236 g solution, 4000 MILLILITER AS DIRECTED, FOLLOW INSTRUCTIONS PROVIDED BY OFFICE (Patient not taking: Reported on 5/12/2023), Disp: , Rfl:   •  glimepiride (AMARYL) 4 mg tablet, Take 4 mg by mouth every morning before breakfast (Patient not taking: Reported on 5/12/2023), Disp: , Rfl:   •  guaiFENesin (ROBITUSSIN) 100 MG/5ML oral liquid, Take 200 mg by mouth Three times daily as needed (Patient not taking: Reported on 5/12/2023), Disp: , Rfl:   •  ibuprofen (MOTRIN) 200 mg tablet, Take by mouth every 6 (six) hours as needed for mild pain (Patient not taking: Reported on 6/2/2022), Disp: , Rfl:   •  linaGLIPtin (Tradjenta) 5 MG TABS, Take 5 mg by mouth (Patient not taking: Reported on 5/12/2023), Disp: , Rfl:   •  lisinopril (ZESTRIL) 40 mg tablet, Take 40 mg by mouth daily (Patient not taking: Reported on 5/12/2023), Disp: , Rfl:   •  meloxicam (MOBIC) 7 5 mg tablet, , Disp: , Rfl:   •  metFORMIN (GLUCOPHAGE-XR) 500 mg 24 hr tablet, Take 1 tablet by mouth 2 (two) times a day with meals (Patient not taking: Reported on 5/12/2023), Disp: , Rfl:   •  pantoprazole (PROTONIX) 20 mg tablet, Take 20 mg by mouth daily, Disp: , Rfl:   •  pantoprazole (PROTONIX) 20 mg tablet, Take 20 mg by mouth daily (Patient not taking: Reported on 5/12/2023), Disp: , Rfl:   •  pantoprazole (PROTONIX) 40 mg tablet, Take by mouth (Patient not taking: Reported on 5/12/2023), Disp: , Rfl:   •  quinapril (ACCUPRIL) 40 MG tablet, Take 40 mg by mouth 2 (two) times a day   (Patient not taking: Reported on 5/12/2023), Disp: , Rfl:   •  ramipril (ALTACE) 5 mg capsule, Take 5 mg by mouth daily (Patient not taking: Reported on 5/12/2023), Disp: , Rfl:   •  valsartan (DIOVAN) 80 mg tablet, Take 80 mg by mouth daily (Patient not taking: Reported on 5/12/2023), Disp: , Rfl:   Allergies   Allergen Reactions   • Amlodipine GI Intolerance   • Atenolol GI Intolerance   • Clonidine Other (See Comments)     Patient reports she did not tolerate, unspecified side effects     • Conjugated Estrogens Hypertension   • Felodipine GI Intolerance and Dizziness     Per pt   • Furosemide GI Intolerance   • Hydrochlorothiazide W-Triamterene GI Intolerance   • Labetalol GI Intolerance and Dizziness     Per pt     • Losartan      Headache, nausea   • Meperidine    • Metoprolol GI Intolerance   • Morphine And Related      hallucinations  hallucinations   • Neosporin [Neomycin-Bacitracin Zn-Polymyx]      rash  rash   • Spironolactone Other (See Comments)     optical migraine   • Sudafed [Pseudoephedrine]    • Colloidal Oatmeal Rash     Vitals:    05/12/23 1123   BP: 138/86   Pulse: 78   Resp: 18   Temp: (!) 97 3 °F (36 3 °C)   SpO2: 97%       Physical Exam  Constitutional: General appearance: The Patient is well-developed and well-nourished who appears the stated age in no acute distress  Patient is pleasant and talkative  HEENT:  Normocephalic  Sclerae are anicteric  Mucous membranes are moist  Neck is supple without adenopathy  No JVD  Palpable left thyroid nodule  Chest: The lungs are clear to auscultation  Cardiac: Heart is regular rate  Abdomen: Abdomen is soft, non-tender, non-distended and without masses  Extremities: There is no clubbing or cyanosis  There is no edema  Symmetric  Neuro: Grossly nonfocal  Gait is normal      Lymphatic: No evidence of cervical adenopathy bilaterally  No evidence of axillary adenopathy bilaterally   No evidence of inguinal adenopathy bilaterally  Skin: Warm, anicteric  Wide excision site is healed well  No evidence of recurrence or in-transit disease  Psych:  Patient is pleasant and talkative  Breasts:        Pathology:  [unfilled]    Labs:      Imaging  No results found  I reviewed the above laboratory and imaging data  Discussion/Summary: 76year-old female status post wide excision of a [id number] melanoma  She is doing well  She is clinically ANDRA at 2 years  There is no evidence of recurrence by physical exam or symptomatology  The 2 lesions on the back, will be monitored by her dermatologist   I did discuss that I would have a low threshold to biopsy these if there are any changes  In regard to the thyroid nodule, I will obtain a thyroid ultrasound now and call her with the results  We did discuss the 4% false negative rate with a benign Afirma as well as the 10 to 15% risk of malignancy with atypia of undetermined significance  I have recommended continued observation  Assuming this ultrasound is stable we will repeat the ultrasound again next year  I will see her again in 1 year for another clinical exam   I discussed if she has any new, persistent, or unexplained symptoms to contact us and directed imaging may be performed  She is agreeable to this plan  All of her and her 's questions were answered

## 2023-05-12 NOTE — LETTER
May 12, 2023     [place], [address] [place] [address]    Patient: Carlos Perea   YOB: 1945   Date of Visit: 5/12/2023       Dear Dr Joelle Rodrigues:    Thank you for referring Carlos Perea to me for evaluation  Below are my notes for this consultation  If you have questions, please do not hesitate to call me  I look forward to following your patient along with you  Sincerely,        Johnnie Mcduffie MD        CC: ANGELINA Schwartz MD  5/12/2023 11:47 AM  Incomplete               Surgical Oncology Follow Up       [address] [facility],[address] [place] [address]  [facility] [place]  [address] [facility] [id number]    Carlos Perea  1945  [id number]  [address]  [facility] [place]  [address] [facility] [id number]    Diagnoses and all orders for this visit:    Malignant melanoma of torso excluding breast ([address] )    Thyroid nodule  -     US thyroid; Future        Chief Complaint   Patient presents with   • Follow-up       Return in about 1 year (around 5/12/2024) for Office Visit, with Kathy  Oncology History   Melanoma ([facility] [address] )   3/18/2021 Initial Diagnosis    Melanoma ([facility] [address] )     3/18/2021 Biopsy    Left upper back shave biopsy:  Malignant melanoma   - at least 0 4 mm thickness   - 0 mitosis   - negative ulceration     4/20/2021 Surgery    In office wide excision  A  Skin, left upper back, excision:  - Residual melanoma in-situ; prior biopsy site reaction   - Atypical lentiginous compound nevus  - Inked margins with no tumor seen         Staging: [id number]  Melanoma of the left upper back, April 2021  Treatment history:  Wide excision left upper back melanoma, April 2021  Left thyroid nodule biopsy, July 2022, [facility] [place] III: Afirma was benign  Current treatment:  observation  Disease status: ANDRA    History of Present Illness: Patient returns in follow-up of her melanoma    She is doing well   She denies any dysphagia or hoarseness  No abdominal pain, nausea or vomiting  No back pain, bone pain, headaches, or cough  She does see her dermatologist every 6 months, and has 2 lesions on her back that are being closely monitored with photographs  Review of Systems  Complete ROS Surg Onc:   Complete ROS Surg Onc:   Constitutional: The patient denies new or recent history of general fatigue, no recent weight loss, no change in appetite  Eyes: No complaints of visual problems, no scleral icterus  ENT: no complaints of ear pain, no hoarseness, no difficulty swallowing,  no tinnitus and no new masses in head, oral cavity, or neck  Cardiovascular: No complaints of chest pain, no palpitations, no ankle edema  Respiratory: No complaints of shortness of breath, no cough  Gastrointestinal: No complaints of jaundice, no bloody stools, no pale stools  Genitourinary: No complaints of dysuria, no hematuria, no nocturia, no frequent urination, no urethral discharge  Musculoskeletal: No complaints of weakness, paralysis, joint stiffness or arthralgias  Integumentary: No complaints of rash, no new lesions  Neurological: No complaints of convulsions, no seizures, no dizziness  Hematologic/Lymphatic: No complaints of easy bruising  Endocrine:  No hot or cold intolerance  No polydipsia, polyphagia, or polyuria  Allergy/immunology:  No environmental allergies  No food allergies  Not immunocompromised  Skin:  No pallor or rash  No wound          Patient Active Problem List   Diagnosis   • Toe cyanosis   • 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)   • Onychomycosis   • Essential hypertension   • [facility] )   • Thyroid nodule   • Sprain of anterior talofibular ligament of left ankle   • Acute left ankle pain   • 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)     Past Medical History:   Diagnosis Date   • Diabetes mellitus ([address] )    • HTN (hypertension)    • Hypertension    • Melanoma of face [facility])      Past Surgical History:   Procedure Laterality Date   • HAND SURGERY Right 09/16/2021   • HYSTERECTOMY     • IR AORTAGRAM WITH RUN-OFF  11/15/2018   • MASTECTOMY     • REPLACEMENT TOTAL KNEE Left    • US GUIDED THYROID BIOPSY  7/7/2022     Family History   Problem Relation Age of Onset   • Diabetes Mother    • Hypertension Mother    • Atrial fibrillation Father      Social History     Socioeconomic History   • Marital status: /Civil Union     Spouse name: Not on file   • Number of children: Not on file   • Years of education: Not on file   • Highest education level: Not on file   Occupational History   • Not on file   Tobacco Use   • Smoking status: Never   • Smokeless tobacco: Never   Vaping Use   • Vaping Use: Never used   Substance and Sexual Activity   • Alcohol use: No   • Drug use: No   • Sexual activity: Not on file   Other Topics Concern   • Not on file   Social History Narrative   • Not on file     Social Determinants of Health     Financial Resource Strain: Not on file   Food Insecurity: Not on file   Transportation Needs: Not on file   Physical Activity: Not on file   Stress: Not on file   Social Connections: Not on file   Intimate Partner Violence: Not on file   Housing Stability: Not on file       Current Outpatient Medications:   •  acetaminophen (TYLENOL) 325 mg tablet, Take 650 mg by mouth every 6 (six) hours as needed for mild pain, Disp: , Rfl:   •  ALPRAZolam (XANAX) 0 25 mg tablet, Take 0 25 mg by mouth as needed, Disp: , Rfl:   •  aspirin (ECOTRIN LOW STRENGTH) 81 mg EC tablet, Take 81 mg by mouth daily, Disp: , Rfl:   •  diphenhydrAMINE (BENADRYL) 25 mg tablet, Take 25 mg by mouth 2 (two) times a day, Disp: , Rfl:   •  Eliquis 5 MG, Take 5 mg by mouth 2 (two) times a day, Disp: , Rfl:   •  glimepiride (AMARYL) 4 mg tablet, Take 4 mg by mouth 2 (two) times a day, Disp: , Rfl:   •  Levemir FlexTouch 100 units/mL injection pen, INJECT 15 UNITS UNDER THE SKIN NIGHTLY, Disp: , Rfl:   •  lovastatin (MEVACOR) 40 MG tablet, , Disp: , Rfl:   •  OneTouch Verio test strip, USE ONE TEST STRIP 1-2X DAILY    MEDICARE ONLY PAYS FOR ONCE DAILY TESTING, Disp: , Rfl:   •  ramipril (ALTACE) 5 mg capsule, Take 5 mg by mouth daily, Disp: , Rfl:   •  Tradjenta 5 MG TABS, Take 5 mg by mouth daily with breakfast, Disp: , Rfl:   •  azithromycin (ZITHROMAX) 250 mg tablet, TAKE 2 TABLETS BY MOUTH TODAY, THEN TAKE 1 TABLET DAILY FOR 4 DAYS (Patient not taking: Reported on 3/23/2023), Disp: , Rfl:   •  ciclopirox (LOPROX) 0 77 % cream, APPLY TWICE A DAY TO AFFECTED AREA IN THE GROIN FOR 3 WEEKS (Patient not taking: Reported on 5/12/2023), Disp: , Rfl:   •  dextromethorphan-guaiFENesin (ROBITUSSIN-DM)  mg/5 mL oral liquid, Take by mouth (Patient not taking: Reported on 3/23/2023), Disp: , Rfl:   •  doxycycline (ADOXA) 100 MG tablet, TAKE 1 TABLET BY MOUTH 2 TIMES A DAY FOR 5 DAYS  (Patient not taking: Reported on 5/12/2023), Disp: , Rfl:   •  GaviLyte-G 236 g solution, 4000 MILLILITER AS DIRECTED, FOLLOW INSTRUCTIONS PROVIDED BY OFFICE (Patient not taking: Reported on 5/12/2023), Disp: , Rfl:   •  glimepiride (AMARYL) 4 mg tablet, Take 4 mg by mouth every morning before breakfast (Patient not taking: Reported on 5/12/2023), Disp: , Rfl:   •  guaiFENesin (ROBITUSSIN) 100 MG/5ML oral liquid, Take 200 mg by mouth Three times daily as needed (Patient not taking: Reported on 5/12/2023), Disp: , Rfl:   •  ibuprofen (MOTRIN) 200 mg tablet, Take by mouth every 6 (six) hours as needed for mild pain (Patient not taking: Reported on 6/2/2022), Disp: , Rfl:   •  linaGLIPtin (Tradjenta) 5 MG TABS, Take 5 mg by mouth (Patient not taking: Reported on 5/12/2023), Disp: , Rfl:   •  lisinopril (ZESTRIL) 40 mg tablet, Take 40 mg by mouth daily (Patient not taking: Reported on 5/12/2023), Disp: , Rfl:   •  meloxicam (MOBIC) 7 5 mg tablet, , Disp: , Rfl:   •  metFORMIN (GLUCOPHAGE-XR) 500 mg 24 hr tablet, Take 1 tablet by mouth 2 (two) times a day with meals (Patient not taking: Reported on 5/12/2023), Disp: , Rfl:   •  pantoprazole (PROTONIX) 20 mg tablet, Take 20 mg by mouth daily, Disp: , Rfl:   •  pantoprazole (PROTONIX) 20 mg tablet, Take 20 mg by mouth daily (Patient not taking: Reported on 5/12/2023), Disp: , Rfl:   •  pantoprazole (PROTONIX) 40 mg tablet, Take by mouth (Patient not taking: Reported on 5/12/2023), Disp: , Rfl:   •  quinapril (ACCUPRIL) 40 MG tablet, Take 40 mg by mouth 2 (two) times a day   (Patient not taking: Reported on 5/12/2023), Disp: , Rfl:   •  ramipril (ALTACE) 5 mg capsule, Take 5 mg by mouth daily (Patient not taking: Reported on 5/12/2023), Disp: , Rfl:   •  valsartan (DIOVAN) 80 mg tablet, Take 80 mg by mouth daily (Patient not taking: Reported on 5/12/2023), Disp: , Rfl:   Allergies   Allergen Reactions   • Amlodipine GI Intolerance   • Atenolol GI Intolerance   • Clonidine Other (See Comments)     Patient reports she did not tolerate, unspecified side effects     • Conjugated Estrogens Hypertension   • Felodipine GI Intolerance and Dizziness     Per pt   • Furosemide GI Intolerance   • Hydrochlorothiazide W-Triamterene GI Intolerance   • Labetalol GI Intolerance and Dizziness     Per pt     • Losartan      Headache, nausea   • Meperidine    • Metoprolol GI Intolerance   • Morphine And Related      hallucinations  hallucinations   • Neosporin [Neomycin-Bacitracin Zn-Polymyx]      rash  rash   • Spironolactone Other (See Comments)     optical migraine   • Sudafed [Pseudoephedrine]    • Colloidal Oatmeal Rash     Vitals:    05/12/23 1123   BP: 138/86   Pulse: 78   Resp: 18   Temp: (!) 97 3 °F (36 3 °C)   SpO2: 97%       Physical Exam  Constitutional: General appearance: The Patient is well-developed and well-nourished who appears the stated age in no acute distress  Patient is pleasant and talkative  HEENT:  Normocephalic    Sclerae are anicteric  Mucous membranes are moist  Neck is supple without adenopathy  No JVD  Palpable left thyroid nodule  Chest: The lungs are clear to auscultation  Cardiac: Heart is regular rate  Abdomen: Abdomen is soft, non-tender, non-distended and without masses  Extremities: There is no clubbing or cyanosis  There is no edema  Symmetric  Neuro: Grossly nonfocal  Gait is normal      Lymphatic: No evidence of cervical adenopathy bilaterally  No evidence of axillary adenopathy bilaterally  No evidence of inguinal adenopathy bilaterally  Skin: Warm, anicteric  Wide excision site is healed well  No evidence of recurrence or in-transit disease  Psych:  Patient is pleasant and talkative  Breasts:        Pathology:  [unfilled]    Labs:      Imaging  No results found  I reviewed the above laboratory and imaging data  Discussion/Summary: 76year-old female status post wide excision of a [id number] melanoma  She is doing well  She is clinically ANDRA at 2 years  There is no evidence of recurrence by physical exam or symptomatology  The 2 lesions on the back, will be monitored by her dermatologist   I did discuss that I would have a low threshold to biopsy these if there are any changes  In regard to the thyroid nodule, I will obtain a thyroid ultrasound now and call her with the results  We did discuss the 4% false negative rate with a benign Afirma as well as the 10 to 15% risk of malignancy with atypia of undetermined significance  I have recommended continued observation  Assuming this ultrasound is stable we will repeat the ultrasound again next year  I will see her again in 1 year for another clinical exam   I discussed if she has any new, persistent, or unexplained symptoms to contact us and directed imaging may be performed  She is agreeable to this plan  All of her and her 's questions were answered

## 2023-05-19 ENCOUNTER — HOSPITAL ENCOUNTER (OUTPATIENT)
Dept: NON INVASIVE DIAGNOSTICS | Facility: CLINIC | Age: 78
Discharge: HOME | End: 2023-05-19

## 2023-05-19 DIAGNOSIS — I70.211 ATHEROSCLEROSIS OF NATIVE ARTERY OF RIGHT LOWER EXTREMITY WITH INTERMITTENT CLAUDICATION (HCC): ICD-10-CM

## 2023-05-22 ENCOUNTER — TELEPHONE (OUTPATIENT)
Dept: VASCULAR SURGERY | Facility: CLINIC | Age: 78
End: 2023-05-22

## 2023-05-22 NOTE — TELEPHONE ENCOUNTER
Attempted to contact patient to schedule appointment(s) listed below  Requested patient call [phone] option 3 to schedule appointment(s)  Patient's appointment(s) are due now      Dopplers  [] Abdominal Aorta Iliac (AOIL)  [] Carotid (CV)   [] Celiac and/or Mesenteric  [] Endovascular Aortic Repair (EVAR)   [] Hemodialysis Access (HD)   [] Lower Limb Arterial (VERN)  [] Lower Limb Venous (LEV)  [] Lower Limb Venous Duplex with Reflux (LEVDR)  [] Renal Artery  [] Upper Limb Arterial (UEA)    [] Upper Limb Venous (UEV)              [] JOANA and Waveform analysis     Advanced Imaging   [] CTA head/neck    [] CTA abdomen    [] CTA abdomen & pelvis    [] CT abdomen with/ without contrast  [] CT abdomen with contrast  [] CT abdomen without contrast    [] CT abdomen & pelvis with/ without contrast  [] CT abdomen & pelvis with contrast  [] CT abdomen & pelvis without contrast    Office Visit   [] New patient, patient last seen over 3 years ago  [] Risk factor modification (RFM)   [x] Follow up   [] Lost to follow up (LTFU)  PT IS DUE FOR 1 YR F/U AND RES REV W/ TCO

## 2023-05-25 ENCOUNTER — HOSPITAL ENCOUNTER (OUTPATIENT)
Dept: RADIOLOGY | Facility: MEDICAL CENTER | Age: 78
Discharge: HOME | End: 2023-05-25

## 2023-05-25 DIAGNOSIS — E04.1 THYROID NODULE: ICD-10-CM

## 2023-05-30 DIAGNOSIS — E04.1 THYROID NODULE: Primary | ICD-10-CM

## 2023-05-30 NOTE — PROGRESS NOTES
Spoke to patient, informed her that thyroid US was stable  Plan to repeat in 1 year  Phone call transferred to central scheduling

## 2023-06-27 ENCOUNTER — OFFICE VISIT (OUTPATIENT)
Dept: PODIATRY | Facility: CLINIC | Age: 78
End: 2023-06-27
Payer: COMMERCIAL

## 2023-06-27 VITALS
BODY MASS INDEX: 27.99 KG/M2 | SYSTOLIC BLOOD PRESSURE: 140 MMHG | HEART RATE: 65 BPM | WEIGHT: 189 LBS | HEIGHT: 69 IN | DIASTOLIC BLOOD PRESSURE: 74 MMHG

## 2023-06-27 DIAGNOSIS — E11.51 DIABETES TYPE 2 WITH ATHEROSCLEROSIS OF ARTERIES OF EXTREMITIES (HCC): Primary | ICD-10-CM

## 2023-06-27 DIAGNOSIS — I70.209 DIABETES TYPE 2 WITH ATHEROSCLEROSIS OF ARTERIES OF EXTREMITIES (HCC): Primary | ICD-10-CM

## 2023-06-27 DIAGNOSIS — B35.1 ONYCHOMYCOSIS: ICD-10-CM

## 2023-06-27 PROCEDURE — 11721 DEBRIDE NAIL 6 OR MORE: CPT | Performed by: PODIATRIST

## 2023-06-27 NOTE — PROGRESS NOTES
"Date Patient Seen: 6/27/23       Subjective:     Chief Complaint:  Diabetic foot care     Patient ID: Asha Gomez is a 76 y o  female        Brand Achilles is here for her regularly scheduled diabetic foot care  The following portions of the patient's history were reviewed and updated as appropriate: allergies, current medications, past family history, past medical history, past social history, past surgical history and problem list     Review of Systems   Constitutional: Negative  Respiratory: Negative  Cardiovascular: Negative  Gastrointestinal: Negative  Musculoskeletal: Positive for gait problem  She is gets calf cramps after walking one block Right  Hematological: Negative  Psychiatric/Behavioral: Negative  Objective:      /74   Pulse 65   Ht 5' 9\" (1 753 m)   Wt 85 7 kg (189 lb)   BMI 27 91 kg/m²        Physical Exam  Vitals reviewed  Constitutional:       Appearance: She is obese  She is not ill-appearing or diaphoretic  Cardiovascular:      Rate and Rhythm: Normal rate  Pulses:           Dorsalis pedis pulses are 0 on the right side and 0 on the left side  Posterior tibial pulses are 0 on the right side and 0 on the left side  Pulmonary:      Effort: Pulmonary effort is normal  No respiratory distress  Musculoskeletal:         General: No swelling or tenderness  Right ankle: Swelling and ecchymosis present  No deformity or lacerations  No proximal fibula tenderness  Normal range of motion  Anterior drawer test negative  Right Achilles Tendon: No tenderness or defects  Amezcua's test negative  Feet:      Right foot:      Skin integrity: No ulcer, skin breakdown, erythema, warmth, callus or dry skin  Left foot:      Skin integrity: No ulcer, skin breakdown, erythema, warmth, callus or dry skin  Skin:     Findings: No bruising  Comments: Nails yellow-brown, 5mm thick, dystrophic, with crumbling subugunal debris x 10   " Diagnoses and all orders for this visit:    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

## 2023-08-03 NOTE — PROGRESS NOTES
Assessment/Plan:    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)  Diabetes mellitus with atherosclerotic occlusive disease and right calf claudication. This is mild and does not appear to severely affect her daily activities. We again discussed the findings on recent duplex evaluation along with the indications for further evaluation and treatment. At this point no further intervention is necessary. We did discuss the importance of risk factor modification emphasizing regular exercise and diabetes control. She will continue her current medical management and annual follow-up. 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 female. Patient present to review VERN done on 5/19/23. Patient c/o pain of the left knee and some discomfort on her feet at night. Patient is currently taking Eliquis and Lovastatin. 75-year-old diabetic with known peripheral arterial occlusive disease presents in follow-up of recent duplex evaluation. On evaluation today her biggest complaint and limitation is secondary to left knee pain. She has a history of total knee replacement and on follow-up with orthopedics it is felt to be of a musculoskeletal etiology and thus she has been referred to physical therapy. She does note some tightness in her calves when walking distances over approximately a block. This is unchanged and does not limit her ability to perform her daily activities. She denies rest pain but does have some numbness in her feet at night consistent with a diabetic peripheral neuropathy. Arterial duplex 5/19/2023 with right SFA occlusion.   Ankle-brachial index of 0.59 and toe pressure 113. This is unchanged from previous study approxi-1 year ago. On the left there is no evidence of significant focal stenosis within the femoral-popliteal segment with normal ankle-brachial index of 1.13 and toe pressure 148. The following portions of the patient's history were reviewed and updated as appropriate: allergies, current medications, past family history, past medical history, past social history, past surgical history and problem list     Past Medical History:  Past Medical History:   Diagnosis Date   • Diabetes mellitus ([address])    • HTN (hypertension)    • Hypertension    • Melanoma of face [facility])        Past Surgical History:  Past Surgical History:   Procedure Laterality Date   • HAND SURGERY Right 09/16/2021   • HYSTERECTOMY     • IR AORTAGRAM WITH RUN-OFF  11/15/2018   • MASTECTOMY     • REPLACEMENT TOTAL KNEE Left    • US GUIDED THYROID BIOPSY  7/7/2022       Social History:  Social History     Substance and Sexual Activity   Alcohol Use No     Social History     Substance and Sexual Activity   Drug Use No     Social History     Tobacco Use   Smoking Status Never   Smokeless Tobacco Never       Family History:  Family History   Problem Relation Age of Onset   • Diabetes Mother    • Hypertension Mother    • Atrial fibrillation Father        Allergies:   Allergies   Allergen Reactions   • Amlodipine GI Intolerance   • Atenolol GI Intolerance   • Clonidine Other (See Comments)     Patient reports she did not tolerate, unspecified side effects     • Conjugated Estrogens Hypertension   • Felodipine GI Intolerance and Dizziness     Per pt   • Furosemide GI Intolerance   • Hydrochlorothiazide W-Triamterene GI Intolerance   • Labetalol GI Intolerance and Dizziness     Per pt     • Losartan      Headache, nausea   • Meperidine    • Metoprolol GI Intolerance   • Morphine And Related      hallucinations  hallucinations   • Neosporin [Neomycin-Bacitracin Zn-Polymyx]      rash  rash   • Spironolactone Other (See Comments)     optical migraine   • Sudafed [Pseudoephedrine]    • Colloidal Oatmeal Rash       Medications:    Current Outpatient Medications:   •  acetaminophen (TYLENOL) 325 mg tablet, Take 650 mg by mouth every 6 (six) hours as needed for mild pain, Disp: , Rfl:   •  ALPRAZolam (XANAX) 0.25 mg tablet, Take 0.25 mg by mouth as needed, Disp: , Rfl:   •  aspirin (ECOTRIN LOW STRENGTH) 81 mg EC tablet, Take 81 mg by mouth daily, Disp: , Rfl:   •  azithromycin (ZITHROMAX) 250 mg tablet, , Disp: , Rfl:   •  ciclopirox (LOPROX) 0.77 % cream, , Disp: , Rfl:   •  dextromethorphan-guaiFENesin (ROBITUSSIN-DM)  mg/5 mL oral liquid, Take by mouth, Disp: , Rfl:   •  diphenhydrAMINE (BENADRYL) 25 mg tablet, Take 25 mg by mouth 2 (two) times a day, Disp: , Rfl:   •  doxycycline (ADOXA) 100 MG tablet, , Disp: , Rfl:   •  Eliquis 5 MG, Take 5 mg by mouth 2 (two) times a day, Disp: , Rfl:   •  GaviLyte-G 236 g solution, , Disp: , Rfl:   •  glimepiride (AMARYL) 4 mg tablet, Take 4 mg by mouth 2 (two) times a day, Disp: , Rfl:   •  glimepiride (AMARYL) 4 mg tablet, Take 4 mg by mouth every morning before breakfast, Disp: , Rfl:   •  guaiFENesin (ROBITUSSIN) 100 MG/5ML oral liquid, Take 200 mg by mouth Three times daily as needed, Disp: , Rfl:   •  ibuprofen (MOTRIN) 200 mg tablet, Take by mouth every 6 (six) hours as needed for mild pain, Disp: , Rfl:   •  Levemir FlexTouch 100 units/mL injection pen, INJECT 15 UNITS UNDER THE SKIN NIGHTLY, Disp: , Rfl:   •  linaGLIPtin (Tradjenta) 5 MG TABS, Take 5 mg by mouth, Disp: , Rfl:   •  lisinopril (ZESTRIL) 40 mg tablet, Take 40 mg by mouth daily, Disp: , Rfl:   •  lovastatin (MEVACOR) 40 MG tablet, , Disp: , Rfl:   •  meloxicam (MOBIC) 7.5 mg tablet, , Disp: , Rfl:   •  metFORMIN (GLUCOPHAGE-XR) 500 mg 24 hr tablet, Take 1 tablet by mouth 2 (two) times a day with meals, Disp: , Rfl:   •  OneTouch Verio test strip, USE ONE TEST STRIP 1-2X DAILY. .. MEDICARE ONLY PAYS FOR ONCE DAILY TESTING, Disp: , Rfl:   •  pantoprazole (PROTONIX) 20 mg tablet, Take 20 mg by mouth daily, Disp: , Rfl:   •  pantoprazole (PROTONIX) 40 mg tablet, Take by mouth, Disp: , Rfl:   •  quinapril (ACCUPRIL) 40 MG tablet, Take 40 mg by mouth 2 (two) times a day, Disp: , Rfl:   •  ramipril (ALTACE) 5 mg capsule, Take 5 mg by mouth daily, Disp: , Rfl:   •  ramipril (ALTACE) 5 mg capsule, Take 5 mg by mouth daily, Disp: , Rfl:   •  Tradjenta 5 MG TABS, Take 5 mg by mouth daily with breakfast, Disp: , Rfl:   •  valsartan (DIOVAN) 80 mg tablet, Take 80 mg by mouth daily, Disp: , Rfl:   •  pantoprazole (PROTONIX) 20 mg tablet, Take 20 mg by mouth daily, Disp: , Rfl:     Vitals:  BP (!) 180/94 (08/07/23 1045)    Temp      Pulse (!) 110 (08/07/23 1045)   Resp      SpO2        Lab Results and Cultures:   CBC with diff:   Lab Results   Component Value Date    WBC 8.03 05/04/2023    HGB 15.3 05/04/2023    HCT 48.6 (H) 05/04/2023    MCV 96 05/04/2023     05/04/2023    RBC 5.04 05/04/2023    MCH 30.4 05/04/2023    MCHC 31.5 05/04/2023    RDW 13.8 05/04/2023    MPV 12.5 05/04/2023    NRBC 0 05/04/2023   ,   BMP/CMP:  Lab Results   Component Value Date    K 4.6 05/04/2023     (H) 05/04/2023    CO2 26 05/04/2023    BUN 23 05/04/2023    CREATININE 1.26 05/04/2023    CALCIUM 9.4 05/04/2023    AST 20 05/04/2023    ALT 34 05/04/2023    ALKPHOS 81 05/04/2023    EGFR 40 05/04/2023   ,   Lipid Panel: No results found for: "CHOL",   Coags:   Lab Results   Component Value Date    PTT 29 06/22/2018    INR 0.95 11/02/2018   ,     . Review of Systems   Constitutional: Negative. HENT: Negative. Eyes: Negative. Respiratory: Negative. Cardiovascular: Negative. Gastrointestinal: Negative. Endocrine: Negative. Genitourinary: Negative. Musculoskeletal: Negative. Skin: Negative. Allergic/Immunologic: Negative. Neurological: Negative. Hematological: Negative. Psychiatric/Behavioral: Negative. Objective:      BP (!) 180/94 (BP Location: Right arm, Patient Position: Sitting, Cuff Size: Adult)   Pulse (!) 110   Ht 5' 9" (1.753 m)   Wt 85.7 kg (189 lb)   BMI 27.91 kg/m²          Physical Exam  Constitutional:       Appearance: Normal appearance. She is well-developed. HENT:      Head: Normocephalic and atraumatic. Eyes:      Conjunctiva/sclera: Conjunctivae normal.   Neck:      Vascular: No carotid bruit or JVD. Cardiovascular:      Rate and Rhythm: Normal rate. Rhythm irregular. Pulses:           Carotid pulses are 2+ on the right side and 2+ on the left side. Radial pulses are 2+ on the right side and 2+ on the left side. Femoral pulses are 2+ on the right side and 2+ on the left side. Popliteal pulses are 0 on the right side. Dorsalis pedis pulses are 0 on the right side and 2+ on the left side. Posterior tibial pulses are 0 on the right side and 2+ on the left side. Heart sounds: Normal heart sounds, S1 normal and S2 normal. No murmur heard. Comments: Varicosities bilaterally  Pulmonary:      Effort: Pulmonary effort is normal.      Breath sounds: Normal breath sounds. Abdominal:      General: There is no abdominal bruit. Palpations: Abdomen is soft. There is no pulsatile mass. Tenderness: There is no abdominal tenderness. Hernia: No hernia is present. Musculoskeletal:         General: No tenderness or deformity. Normal range of motion. Cervical back: Normal range of motion and neck supple. Skin:     General: Skin is warm and dry. Coloration: Skin is not pale. Neurological:      General: No focal deficit present. Mental Status: She is alert and oriented to person, place, and time. Motor: No weakness.       Gait: Gait normal.   Psychiatric:         Mood and Affect: Mood normal.         Speech: Speech normal.         Behavior: Behavior normal.

## 2023-08-07 ENCOUNTER — OFFICE VISIT (OUTPATIENT)
Dept: VASCULAR SURGERY | Facility: CLINIC | Age: 78
End: 2023-08-07
Payer: COMMERCIAL

## 2023-08-07 VITALS
SYSTOLIC BLOOD PRESSURE: 180 MMHG | WEIGHT: 189 LBS | BODY MASS INDEX: 27.99 KG/M2 | HEIGHT: 69 IN | DIASTOLIC BLOOD PRESSURE: 94 MMHG | HEART RATE: 110 BPM

## 2023-08-07 DIAGNOSIS — E11.51 DIABETES TYPE 2 WITH ATHEROSCLEROSIS OF ARTERIES OF EXTREMITIES (HCC): ICD-10-CM

## 2023-08-07 DIAGNOSIS — I70.211 ATHEROSCLEROSIS OF NATIVE ARTERY OF RIGHT LOWER EXTREMITY WITH INTERMITTENT CLAUDICATION (HCC): Primary | Chronic | ICD-10-CM

## 2023-08-07 DIAGNOSIS — I70.209 DIABETES TYPE 2 WITH ATHEROSCLEROSIS OF ARTERIES OF EXTREMITIES (HCC): ICD-10-CM

## 2023-08-07 PROCEDURE — 99214 OFFICE O/P EST MOD 30 MIN: CPT | Performed by: SURGERY

## 2023-08-07 NOTE — LETTER
August 7, 2023     Telly Mora, [address] [place] [address]    Patient: Silver Tate   YOB: 1945   Date of Visit: 8/7/2023       Dear Dr. Prashant Landeros:    Thank you for referring Silver Tate to me for evaluation. Below are the relevant portions of my assessment and plan of care. 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)  Diabetes mellitus with atherosclerotic occlusive disease and right calf claudication. This is mild and does not appear to severely affect her daily activities. We again discussed the findings on recent duplex evaluation along with the indications for further evaluation and treatment. At this point no further intervention is necessary. We did discuss the importance of risk factor modification emphasizing regular exercise and diabetes control. She will continue her current medical management and annual follow-up. If you have questions, please do not hesitate to call me. I look forward to following Daniel Beckford along with you.          Sincerely,        Ryan Gibson MD        CC: No Recipients

## 2023-08-07 NOTE — ASSESSMENT & PLAN NOTE
Diabetes mellitus with atherosclerotic occlusive disease and right calf claudication. This is mild and does not appear to severely affect her daily activities. We again discussed the findings on recent duplex evaluation along with the indications for further evaluation and treatment. At this point no further intervention is necessary. We did discuss the importance of risk factor modification emphasizing regular exercise and diabetes control. She will continue her current medical management and annual follow-up.

## 2023-08-07 NOTE — PATIENT INSTRUCTIONS
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)  Diabetes mellitus with atherosclerotic occlusive disease and right calf claudication. This is mild and does not appear to severely affect her daily activities. We again discussed the findings on recent duplex evaluation along with the indications for further evaluation and treatment. At this point no further intervention is necessary. We did discuss the importance of risk factor modification emphasizing regular exercise and diabetes control. She will continue her current medical management and annual follow-up.

## 2023-09-20 ENCOUNTER — OFFICE VISIT (OUTPATIENT)
Dept: VASCULAR SURGERY | Facility: CLINIC | Age: 78
End: 2023-09-20
Payer: COMMERCIAL

## 2023-09-20 VITALS
OXYGEN SATURATION: 97 % | DIASTOLIC BLOOD PRESSURE: 100 MMHG | BODY MASS INDEX: 27.91 KG/M2 | HEIGHT: 69 IN | SYSTOLIC BLOOD PRESSURE: 190 MMHG | HEART RATE: 69 BPM

## 2023-09-20 DIAGNOSIS — M79.672 LEFT FOOT PAIN: ICD-10-CM

## 2023-09-20 DIAGNOSIS — I70.211 ATHEROSCLEROSIS OF NATIVE ARTERY OF RIGHT LOWER EXTREMITY WITH INTERMITTENT CLAUDICATION (HCC): Primary | Chronic | ICD-10-CM

## 2023-09-20 PROCEDURE — 99213 OFFICE O/P EST LOW 20 MIN: CPT | Performed by: SURGERY

## 2023-09-20 NOTE — ASSESSMENT & PLAN NOTE
Pain left midfoot. There is no evidence of significant arterial occlusive disease with a easily palpable pedal pulse. With her symptoms associated with weightbearing and tenderness, this is consistent with a musculoskeletal etiology. She is evidently scheduled for follow-up with podiatry in this regard.

## 2023-09-20 NOTE — ASSESSMENT & PLAN NOTE
Known peripheral arterial occlusive disease with right superficial femoral artery occlusion. This appears to be minimally symptomatic. No further vascular work-up or intervention is planned at this time other than annual follow-up. There is no evidence of significant arterial disease on the left side to account for her current complaints of foot and knee and groin tenderness. This appears to be of a musculoskeletal etiology and will be evaluated as noted above.

## 2023-09-20 NOTE — PROGRESS NOTES
Assessment/Plan:    Left foot pain  Pain left midfoot. There is no evidence of significant arterial occlusive disease with a easily palpable pedal pulse. With her symptoms associated with weightbearing and tenderness, this is consistent with a musculoskeletal etiology. She is evidently scheduled for follow-up with podiatry in this regard. 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)  Known peripheral arterial occlusive disease with right superficial femoral artery occlusion. This appears to be minimally symptomatic. No further vascular work-up or intervention is planned at this time other than annual follow-up. There is no evidence of significant arterial disease on the left side to account for her current complaints of foot and knee and groin tenderness. This appears to be of a musculoskeletal etiology and will be evaluated as noted above. Diagnoses and all orders for this visit:    Atherosclerosis of native artery of right lower extremity with intermittent claudication (HCC)  -     VAS lower limb arterial duplex, complete bilateral; Future    Left foot pain          Subjective:      Patient ID: Speedy Coello is a 76 y.o. female. Patient presents for review of VERN and ANNETTE done 9/15/2023 at [facility]. She states she continues to have pain in LLE, her groin and her foot. She denies wounds of BLE. She is taking Eliquis and Lovastatin. 75-year-old diabetic with known peripheral arterial occlusive disease was evaluated approximately 6 weeks ago with only mild claudication. No further intervention was planned other than periodic follow-up. Since that time she has had episodes of acute onset of left ankle, left knee and now left foot pain. She notes no specific inciting event. She notes discomfort is severe even with weightbearing and she is tender to the touch.   She underwent evaluation at the emergency room at [facility] at which time an arterial and venous duplex study were done which showed no evidence of DVT nor significant arterial disease in the left lower extremity. She notes no significant symptoms in the right lower extremity. Arterial duplex 5/19/2023 with right superficial femoral artery occlusion. Ankle-brachial index of 0.59. On the left there is a mild stenosis in the superficial femoral artery with normal ankle-brachial index of 1.13. Arterial duplex report from [facility] 9/15/2023 with atherosclerotic disease in the femoral-popliteal segment without significant focal stenosis. Intact pedal perfusion. Venous duplex with no evidence of DVT. The following portions of the patient's history were reviewed and updated as appropriate: allergies, current medications, past family history, past medical history, past social history, past surgical history and problem list     Past Medical History:  Past Medical History:   Diagnosis Date   • Diabetes mellitus ([address])    • HTN (hypertension)    • Hypertension    • Melanoma of face [facility])        Past Surgical History:  Past Surgical History:   Procedure Laterality Date   • HAND SURGERY Right 09/16/2021   • HYSTERECTOMY     • IR AORTAGRAM WITH RUN-OFF  11/15/2018   • MASTECTOMY     • REPLACEMENT TOTAL KNEE Left    • US GUIDED THYROID BIOPSY  7/7/2022       Social History:  Social History     Substance and Sexual Activity   Alcohol Use No     Social History     Substance and Sexual Activity   Drug Use No     Social History     Tobacco Use   Smoking Status Never   Smokeless Tobacco Never       Family History:  Family History   Problem Relation Age of Onset   • Diabetes Mother    • Hypertension Mother    • Atrial fibrillation Father        Allergies:   Allergies   Allergen Reactions   • Amlodipine GI Intolerance   • Atenolol GI Intolerance   • Clonidine Other (See Comments)     Patient reports she did not tolerate, unspecified side effects     • Conjugated Estrogens Hypertension   • Felodipine GI Intolerance and Dizziness     Per pt   • Furosemide GI Intolerance   • Hydrochlorothiazide W-Triamterene GI Intolerance   • Labetalol GI Intolerance and Dizziness     Per pt     • Losartan      Headache, nausea   • Meperidine    • Metoprolol GI Intolerance   • Morphine And Related      hallucinations  hallucinations   • Neosporin [Neomycin-Bacitracin Zn-Polymyx]      rash  rash   • Spironolactone Other (See Comments)     optical migraine   • Sudafed [Pseudoephedrine]    • Colloidal Oatmeal Rash       Medications:    Current Outpatient Medications:   •  acetaminophen (TYLENOL) 325 mg tablet, Take 650 mg by mouth every 6 (six) hours as needed for mild pain, Disp: , Rfl:   •  ALPRAZolam (XANAX) 0.25 mg tablet, Take 0.25 mg by mouth as needed, Disp: , Rfl:   •  diphenhydrAMINE (BENADRYL) 25 mg tablet, Take 25 mg by mouth 2 (two) times a day, Disp: , Rfl:   •  Eliquis 5 MG, Take 5 mg by mouth 2 (two) times a day, Disp: , Rfl:   •  glimepiride (AMARYL) 4 mg tablet, Take 4 mg by mouth 2 (two) times a day, Disp: , Rfl:   •  linaGLIPtin (Tradjenta) 5 MG TABS, Take 5 mg by mouth, Disp: , Rfl:   •  lovastatin (MEVACOR) 40 MG tablet, , Disp: , Rfl:   •  ramipril (ALTACE) 5 mg capsule, Take 5 mg by mouth daily, Disp: , Rfl:   •  Tradjenta 5 MG TABS, Take 5 mg by mouth daily with breakfast, Disp: , Rfl:   •  aspirin (ECOTRIN LOW STRENGTH) 81 mg EC tablet, Take 81 mg by mouth daily (Patient not taking: Reported on 9/20/2023), Disp: , Rfl:   •  azithromycin (ZITHROMAX) 250 mg tablet, , Disp: , Rfl:   •  ciclopirox (LOPROX) 0.77 % cream, , Disp: , Rfl:   •  dextromethorphan-guaiFENesin (ROBITUSSIN-DM)  mg/5 mL oral liquid, Take by mouth (Patient not taking: Reported on 9/20/2023), Disp: , Rfl:   •  doxycycline (ADOXA) 100 MG tablet, , Disp: , Rfl:   •  GaviLyte-G 236 g solution, , Disp: , Rfl:   •  glimepiride (AMARYL) 4 mg tablet, Take 4 mg by mouth every morning before breakfast (Patient not taking: Reported on 9/20/2023), Disp: , Rfl:   •  guaiFENesin (ROBITUSSIN) 100 MG/5ML oral liquid, Take 200 mg by mouth Three times daily as needed (Patient not taking: Reported on 9/20/2023), Disp: , Rfl:   •  ibuprofen (MOTRIN) 200 mg tablet, Take by mouth every 6 (six) hours as needed for mild pain (Patient not taking: Reported on 9/20/2023), Disp: , Rfl:   •  Levemir FlexTouch 100 units/mL injection pen, INJECT 15 UNITS UNDER THE SKIN NIGHTLY, Disp: , Rfl:   •  lisinopril (ZESTRIL) 40 mg tablet, Take 40 mg by mouth daily (Patient not taking: Reported on 9/20/2023), Disp: , Rfl:   •  meloxicam (MOBIC) 7.5 mg tablet, , Disp: , Rfl:   •  metFORMIN (GLUCOPHAGE-XR) 500 mg 24 hr tablet, Take 1 tablet by mouth 2 (two) times a day with meals (Patient not taking: Reported on 9/20/2023), Disp: , Rfl:   •  OneTouch Verio test strip, USE ONE TEST STRIP 1-2X DAILY. .. MEDICARE ONLY PAYS FOR ONCE DAILY TESTING (Patient not taking: Reported on 9/20/2023), Disp: , Rfl:   •  pantoprazole (PROTONIX) 20 mg tablet, Take 20 mg by mouth daily, Disp: , Rfl:   •  pantoprazole (PROTONIX) 20 mg tablet, Take 20 mg by mouth daily (Patient not taking: Reported on 9/20/2023), Disp: , Rfl:   •  pantoprazole (PROTONIX) 40 mg tablet, Take by mouth (Patient not taking: Reported on 9/20/2023), Disp: , Rfl:   •  quinapril (ACCUPRIL) 40 MG tablet, Take 40 mg by mouth 2 (two) times a day (Patient not taking: Reported on 9/20/2023), Disp: , Rfl:   •  ramipril (ALTACE) 5 mg capsule, Take 5 mg by mouth daily (Patient not taking: Reported on 9/20/2023), Disp: , Rfl:   •  valsartan (DIOVAN) 80 mg tablet, Take 80 mg by mouth daily (Patient not taking: Reported on 9/20/2023), Disp: , Rfl:     Vitals:  BP (!) 190/100 (09/20/23 1059)    Temp      Pulse 69 (09/20/23 1059)   Resp      SpO2 97 % (09/20/23 1059)      Lab Results and Cultures:   CBC with diff:   Lab Results   Component Value Date    WBC 8.03 05/04/2023    HGB 15.3 05/04/2023    HCT 48.6 (H) 05/04/2023    MCV 96 05/04/2023     05/04/2023    RBC 5.04 05/04/2023    MCH 30.4 05/04/2023    MCHC 31.5 05/04/2023    RDW 13.8 05/04/2023    MPV 12.5 05/04/2023    NRBC 0 05/04/2023   ,   BMP/CMP:  Lab Results   Component Value Date    K 4.6 05/04/2023     (H) 05/04/2023    CO2 26 05/04/2023    BUN 23 05/04/2023    CREATININE 1.26 05/04/2023    CALCIUM 9.4 05/04/2023    AST 20 05/04/2023    ALT 34 05/04/2023    ALKPHOS 81 05/04/2023    EGFR 40 05/04/2023   ,   Lipid Panel: No results found for: "CHOL",   Coags:   Lab Results   Component Value Date    PTT 29 06/22/2018    INR 0.95 11/02/2018   ,     . Review of Systems   Constitutional: Negative. HENT: Negative. Eyes: Negative. Respiratory: Negative. Cardiovascular: Negative. Gastrointestinal: Negative. Endocrine: Negative. Genitourinary: Negative. Musculoskeletal: Negative. Skin: Negative. Allergic/Immunologic: Negative. Neurological: Negative. Hematological: Negative. Psychiatric/Behavioral: Negative. Objective:      BP (!) 190/100 (BP Location: Left arm, Patient Position: Sitting, Cuff Size: Standard)   Pulse 69   Ht 5' 9" (1.753 m)   SpO2 97%   BMI 27.91 kg/m²          Physical Exam  Constitutional:       Appearance: Normal appearance. Cardiovascular:      Rate and Rhythm: Normal rate. Pulses:           Dorsalis pedis pulses are 2+ on the left side. Comments: Left foot is pink, warm with intact capillary refill  Musculoskeletal:         General: Tenderness (Tenderness on palpation of the midfoot and the anterior left knee) present. No swelling. Skin:     General: Skin is warm and dry. Neurological:      General: No focal deficit present. Mental Status: She is alert and oriented to person, place, and time. Sensory: No sensory deficit. Motor: No weakness. Gait: Gait abnormal (Currently using a wheelchair and a postop shoe on the left foot).    Psychiatric:         Mood and Affect: Mood normal.

## 2023-09-20 NOTE — PATIENT INSTRUCTIONS
Left foot pain  Pain left midfoot. There is no evidence of significant arterial occlusive disease with a easily palpable pedal pulse. With her symptoms associated with weightbearing and tenderness, this is consistent with a musculoskeletal etiology. She is evidently scheduled for follow-up with podiatry in this regard. 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)  Known peripheral arterial occlusive disease with right superficial femoral artery occlusion. This appears to be minimally symptomatic. No further vascular work-up or intervention is planned at this time other than annual follow-up. There is no evidence of significant arterial disease on the left side to account for her current complaints of foot and knee and groin tenderness. This appears to be of a musculoskeletal etiology and will be evaluated as noted above.

## 2023-09-20 NOTE — LETTER
September 20, 2023     Marcia Cardona, [address] [facility] [address] [address]    Patient: Sunshine Cardozo   YOB: 1945   Date of Visit: 9/20/2023       Dear Dr. Lopez Bachelor:    Thank you for referring Sunshine Cardozo to me for evaluation. Below are the relevant portions of my assessment and plan of care. Left foot pain  Pain left midfoot. There is no evidence of significant arterial occlusive disease with a easily palpable pedal pulse. With her symptoms associated with weightbearing and tenderness, this is consistent with a musculoskeletal etiology. She is evidently scheduled for follow-up with podiatry in this regard. 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)  Known peripheral arterial occlusive disease with right superficial femoral artery occlusion. This appears to be minimally symptomatic. No further vascular work-up or intervention is planned at this time other than annual follow-up. There is no evidence of significant arterial disease on the left side to account for her current complaints of foot and knee and groin tenderness. This appears to be of a musculoskeletal etiology and will be evaluated as noted above. If you have questions, please do not hesitate to call me. I look forward to following Nicanor Parks along with you.          Sincerely,        Richard Rae MD        CC: No Recipients

## 2023-09-28 ENCOUNTER — APPOINTMENT (OUTPATIENT)
Dept: LAB | Facility: MEDICAL CENTER | Age: 78
End: 2023-09-28
Payer: COMMERCIAL

## 2023-09-28 DIAGNOSIS — Z92.241 HISTORY OF RECENT STEROID USE: ICD-10-CM

## 2023-09-28 DIAGNOSIS — E78.5 DYSLIPIDEMIA: ICD-10-CM

## 2023-09-28 DIAGNOSIS — Z79.4 INSULIN DEPENDENT TYPE 2 DIABETES MELLITUS (HCC): ICD-10-CM

## 2023-09-28 DIAGNOSIS — I48.19 ATRIAL FIBRILLATION, PERSISTENT (HCC): ICD-10-CM

## 2023-09-28 DIAGNOSIS — I10 ESSENTIAL HYPERTENSION, BENIGN: ICD-10-CM

## 2023-09-28 DIAGNOSIS — E11.9 INSULIN DEPENDENT TYPE 2 DIABETES MELLITUS (HCC): ICD-10-CM

## 2023-09-28 LAB
BASOPHILS # BLD AUTO: 0.06 THOUSANDS/ÂΜL (ref 0–0.1)
BASOPHILS NFR BLD AUTO: 1 % (ref 0–1)
EOSINOPHIL # BLD AUTO: 0.21 THOUSAND/ÂΜL (ref 0–0.61)
EOSINOPHIL NFR BLD AUTO: 2 % (ref 0–6)
ERYTHROCYTE [DISTWIDTH] IN BLOOD BY AUTOMATED COUNT: 13.5 % (ref 11.6–15.1)
EST. AVERAGE GLUCOSE BLD GHB EST-MCNC: 166 MG/DL
HBA1C MFR BLD: 7.4 %
HCT VFR BLD AUTO: 46.3 % (ref 34.8–46.1)
HGB BLD-MCNC: 14.8 G/DL (ref 11.5–15.4)
IMM GRANULOCYTES # BLD AUTO: 0.03 THOUSAND/UL (ref 0–0.2)
IMM GRANULOCYTES NFR BLD AUTO: 0 % (ref 0–2)
LYMPHOCYTES # BLD AUTO: 2.42 THOUSANDS/ÂΜL (ref 0.6–4.47)
LYMPHOCYTES NFR BLD AUTO: 27 % (ref 14–44)
MCH RBC QN AUTO: 31.2 PG (ref 26.8–34.3)
MCHC RBC AUTO-ENTMCNC: 32 G/DL (ref 31.4–37.4)
MCV RBC AUTO: 98 FL (ref 82–98)
MONOCYTES # BLD AUTO: 0.89 THOUSAND/ÂΜL (ref 0.17–1.22)
MONOCYTES NFR BLD AUTO: 10 % (ref 4–12)
NEUTROPHILS # BLD AUTO: 5.38 THOUSANDS/ÂΜL (ref 1.85–7.62)
NEUTS SEG NFR BLD AUTO: 60 % (ref 43–75)
NRBC BLD AUTO-RTO: 0 /100 WBCS
PLATELET # BLD AUTO: 235 THOUSANDS/UL (ref 149–390)
PMV BLD AUTO: 11.7 FL (ref 8.9–12.7)
RBC # BLD AUTO: 4.74 MILLION/UL (ref 3.81–5.12)
TSH SERPL DL<=0.05 MIU/L-ACNC: 3.89 UIU/ML (ref 0.45–4.5)
WBC # BLD AUTO: 8.99 THOUSAND/UL (ref 4.31–10.16)

## 2023-09-28 PROCEDURE — 83036 HEMOGLOBIN GLYCOSYLATED A1C: CPT

## 2023-09-28 PROCEDURE — 80053 COMPREHEN METABOLIC PANEL: CPT

## 2023-09-28 PROCEDURE — 36415 COLL VENOUS BLD VENIPUNCTURE: CPT

## 2023-09-28 PROCEDURE — 80061 LIPID PANEL: CPT

## 2023-09-28 PROCEDURE — 84443 ASSAY THYROID STIM HORMONE: CPT

## 2023-09-28 PROCEDURE — 85025 COMPLETE CBC W/AUTO DIFF WBC: CPT

## 2023-09-29 LAB
ALBUMIN SERPL BCP-MCNC: 4.1 G/DL (ref 3.5–5)
ALP SERPL-CCNC: 64 U/L (ref 34–104)
ALT SERPL W P-5'-P-CCNC: 26 U/L (ref 7–52)
ANION GAP SERPL CALCULATED.3IONS-SCNC: 14 MMOL/L
AST SERPL W P-5'-P-CCNC: 24 U/L (ref 13–39)
BILIRUB SERPL-MCNC: 0.49 MG/DL (ref 0.2–1)
BUN SERPL-MCNC: 17 MG/DL (ref 5–25)
CALCIUM SERPL-MCNC: 9.4 MG/DL (ref 8.4–10.2)
CHLORIDE SERPL-SCNC: 106 MMOL/L (ref 96–108)
CHOLEST SERPL-MCNC: 141 MG/DL
CO2 SERPL-SCNC: 25 MMOL/L (ref 21–32)
CREAT SERPL-MCNC: 1.24 MG/DL (ref 0.6–1.3)
GFR SERPL CREATININE-BSD FRML MDRD: 41 ML/MIN/1.73SQ M
GLUCOSE P FAST SERPL-MCNC: 115 MG/DL (ref 65–99)
HDLC SERPL-MCNC: 37 MG/DL
LDLC SERPL CALC-MCNC: 70 MG/DL (ref 0–100)
NONHDLC SERPL-MCNC: 104 MG/DL
POTASSIUM SERPL-SCNC: 4.9 MMOL/L (ref 3.5–5.3)
PROT SERPL-MCNC: 6.9 G/DL (ref 6.4–8.4)
SODIUM SERPL-SCNC: 145 MMOL/L (ref 135–147)
TRIGL SERPL-MCNC: 168 MG/DL

## 2023-10-04 ENCOUNTER — OFFICE VISIT (OUTPATIENT)
Dept: PODIATRY | Facility: CLINIC | Age: 78
End: 2023-10-04
Payer: COMMERCIAL

## 2023-10-04 VITALS
SYSTOLIC BLOOD PRESSURE: 150 MMHG | WEIGHT: 191.6 LBS | HEIGHT: 69 IN | BODY MASS INDEX: 28.38 KG/M2 | DIASTOLIC BLOOD PRESSURE: 89 MMHG | HEART RATE: 72 BPM

## 2023-10-04 DIAGNOSIS — I70.219 ATHEROSCLEROSIS OF NATIVE ARTERY OF LOWER EXTREMITY WITH INTERMITTENT CLAUDICATION, UNSPECIFIED LATERALITY (HCC): Chronic | ICD-10-CM

## 2023-10-04 DIAGNOSIS — M25.572 ACUTE LEFT ANKLE PAIN: Primary | ICD-10-CM

## 2023-10-04 DIAGNOSIS — B35.1 ONYCHOMYCOSIS: ICD-10-CM

## 2023-10-04 DIAGNOSIS — I70.209 DIABETES TYPE 2 WITH ATHEROSCLEROSIS OF ARTERIES OF EXTREMITIES (HCC): ICD-10-CM

## 2023-10-04 DIAGNOSIS — E11.51 DIABETES TYPE 2 WITH ATHEROSCLEROSIS OF ARTERIES OF EXTREMITIES (HCC): ICD-10-CM

## 2023-10-04 PROCEDURE — 99213 OFFICE O/P EST LOW 20 MIN: CPT | Performed by: PODIATRIST

## 2023-10-04 PROCEDURE — 11721 DEBRIDE NAIL 6 OR MORE: CPT | Performed by: PODIATRIST

## 2023-10-10 NOTE — PROGRESS NOTES
This patient was seen on 10/4/23. My role is Foot , Ankle, and Wound Specialist    SUBJECTIVE    Chief Complaint:  Foot pain     Patient ID: Lupe Merida is a 76 y.o. female.       Ludin Yeager is here for her regularly scheduled diabetic foot care. She's complaining of Left hindfoot pain exacerbation over the last couple of weeks that worsened after a muscle pull of her leg. This has gotten better, and the foot pain has now mostly abated. The following portions of the patient's history were reviewed and updated as appropriate: allergies, current medications, past family history, past medical history, past social history, past surgical history and problem list.    Review of Systems   Constitutional: Negative. Respiratory: Negative. Cardiovascular: Negative. Gastrointestinal: Negative. Musculoskeletal: Positive for arthralgias and gait problem. She is gets calf cramps after walking one block Right. Hematological: Negative. Psychiatric/Behavioral: Negative. OBJECTIVE      /89   Pulse 72   Ht 5' 9" (1.753 m)   Wt 86.9 kg (191 lb 9.6 oz)   BMI 28.29 kg/m²     Foot/Ankle Musculoskeletal Exam    Neurovascular    Right      Dorsalis pedis: 0      Posterior tibial: 0    Left      Dorsalis pedis: 0      Posterior tibial: 0       Physical Exam  Vitals reviewed. Constitutional:       Appearance: She is obese. She is not ill-appearing or diaphoretic. Cardiovascular:      Rate and Rhythm: Normal rate. Pulses:           Dorsalis pedis pulses are 0 on the right side and 0 on the left side. Posterior tibial pulses are 0 on the right side and 0 on the left side. Pulmonary:      Effort: Pulmonary effort is normal. No respiratory distress. Musculoskeletal:         General: No swelling or tenderness. Right ankle: Swelling and ecchymosis present. No deformity or lacerations. No proximal fibula tenderness. Normal range of motion.  Anterior drawer test negative. Right Achilles Tendon: No tenderness or defects. Amezcua's test negative. Feet:      Right foot:      Skin integrity: No ulcer, skin breakdown, erythema, warmth, callus or dry skin. Left foot:      Skin integrity: No ulcer, skin breakdown, erythema, warmth, callus or dry skin. Skin:     Findings: No bruising. Comments: Nails yellow-brown, 5mm thick, dystrophic, with crumbling subugunal debris x 10. ASSESSMENT     Diagnoses and all orders for this visit:    Acute left ankle pain  -     X-ray foot left 3+ views; Future         Problem List Items Addressed This Visit        Other    Acute left ankle pain - Primary    Relevant Orders    X-ray foot left 3+ views           PLAN    Xrays ordered Left foot; noting signficant DJD of the subtalar joint with narrowing and also some DJD of the TNJ as well with periarticular spurring. These findings correspond with her symptoms. I told her if the symptoms recur she can consider a corticosteroid  Injection. Nails x 10 were debrided with double-action nail forceps of their thickness, length and width. Foot precautions reviewed with patient including the need to wear protective shoegear at all times when walking including in the home, the need to check feet all surfaces daily with a mirror and report and skin breaks to podiatry, the need to apply an emollient to skin of feet daily.

## 2024-03-06 ENCOUNTER — TELEPHONE (OUTPATIENT)
Dept: VASCULAR SURGERY | Facility: CLINIC | Age: 79
End: 2024-03-06

## 2024-03-06 NOTE — TELEPHONE ENCOUNTER
Pt was last seen by Dr. Connolly 9/2023. Pt called the office stating she has been having swelling in her b/l lower legs/feet since December. Pt stated she fell and had surgery on her left femur in December and ever since then, her both legs have been swollen. Pt stated she saw her endocrinology yesterday, 3/5/2024, who advised pt to follow up with vascular and cardiology. Pt states she does get pain in her legs at times however denies any numbness/tingling. She denies any color/temp changes nor wounds. Pt currently has an VERN scheduled for 5/24/2024 with no return office visit. Please review and advise.

## 2024-03-06 NOTE — TELEPHONE ENCOUNTER
Called pt to inform her. She voiced understanding. Call was transferred to the call center to set up appts.

## 2024-03-06 NOTE — TELEPHONE ENCOUNTER
YouJust now (1:34 PM)     SF  Called pt to inform her. She voiced understanding. Call was transferred to the call center to set up appts.          Note        Laury Perez PA-C  You; [facility] [id number] minutes ago (1:21 PM)       I think the VERN is fine until she is seen. -RD     You  [facility]; [facility] [id number] minutes ago (12:53 PM)     SF  She is only scheduled for an VERN. Would you want any additional testing?         Note        ANGELINA Busby; [facility] [id number] minutes ago (12:51 PM)       If she is not have difficulty walking, chest pain or SOB, we can see her in the office for chronic leg swelling. Move up her doppler prior to OV. IVONNE

## 2024-03-14 ENCOUNTER — OFFICE VISIT (OUTPATIENT)
Dept: OBGYN CLINIC | Facility: MEDICAL CENTER | Age: 79
End: 2024-03-14
Payer: COMMERCIAL

## 2024-03-14 DIAGNOSIS — B37.31 VAGINAL YEAST INFECTION: ICD-10-CM

## 2024-03-14 DIAGNOSIS — B37.2 YEAST DERMATITIS: Primary | ICD-10-CM

## 2024-03-14 DIAGNOSIS — B37.2 YEAST DERMATITIS: ICD-10-CM

## 2024-03-14 LAB
BV WHIFF TEST VAG QL: ABNORMAL
CLUE CELLS SPEC QL WET PREP: ABNORMAL
PH SMN: NORMAL [PH]
SL AMB POCT WET MOUNT: ABNORMAL
T VAGINALIS VAG QL WET PREP: ABNORMAL
YEAST VAG QL WET PREP: ABNORMAL

## 2024-03-14 PROCEDURE — 99213 OFFICE O/P EST LOW 20 MIN: CPT | Performed by: NURSE PRACTITIONER

## 2024-03-14 PROCEDURE — 87210 SMEAR WET MOUNT SALINE/INK: CPT | Performed by: NURSE PRACTITIONER

## 2024-03-14 RX ORDER — NYSTATIN AND TRIAMCINOLONE ACETONIDE 100000; 1 [USP'U]/G; MG/G
OINTMENT TOPICAL 2 TIMES DAILY
Qty: 30 G | Refills: 0 | Status: SHIPPED | OUTPATIENT
Start: 2024-03-14 | End: 2024-03-15

## 2024-03-14 NOTE — PATIENT INSTRUCTIONS
Wet mount done showing yeast infection.   Bathe daily with dove bar soap. Rinse well and pat dry.  Monistat 7 day treatment. Use cream nightly as directed.   Nystatn triamcinolone ointment to affected area twice daily for one week.  Coconut oil to vulva for protection.   Rinse vulva with water after urinating.  Avoid scratching.   No sex during treatment.   Healthy diet with adequate hydration encouraged.   Call with any worsening of symptoms.   Follow up in 2-3 weeks; sooner if needed.

## 2024-03-14 NOTE — PROGRESS NOTES
Assessment/Plan:  Wet mount done showing yeast infection.   Bathe daily with dove bar soap. Rinse well and pat dry.  Monistat 7 day treatment. Use cream nightly as directed.   Nystatn triamcinolone ointment to affected area twice daily for one week.  Coconut oil to vulva for protection.   Rinse vulva with water after urinating.  Avoid scratching.   No sex during treatment.   Healthy diet with adequate hydration encouraged.   Call with any worsening of symptoms.   Follow up in 2-3 weeks; sooner if needed.         1. Yeast dermatitis  -     POCT wet mount  -     nystatin-triamcinolone (MYCOLOG-II) ointment; Apply topically 2 (two) times a day    2. Vaginal yeast infection  -     POCT wet mount  -     miconazole (MONISTAT-7) 2 % vaginal cream; Insert 1 applicator into the vagina daily at bedtime               Subjective:      Patient ID: Rebeca Mccartney is a 78 y.o. female.    HPI    Rebeca Mccartney is a 78 y.o.  female who is here today as a new patient for a problem visit .   Here today with vulvar irritation and redness x 2 weeks. She has tried vaseline to the area without improvement.   Denies vulvar itching or burning. Does feel vulvar pain with wiping rating 3-4/10. No pain with sitting.     Rebeca Mccartney is not currenlty sexually active with male partner of 56 years. Denies pain, bleeding or dryness.    She denies vaginal discharge, itching, pelvic pain.   She has no urinary concerns, does not have incontinence.  No bowel concerns.    She is currently recovering from a left femur fracture ( 3 months ago). Using walker for ambulation.  Juan David is supportive and helping as her caretaker.     The following portions of the patient's history were reviewed and updated as appropriate: allergies, current medications, past family history, past medical history, past social history, past surgical history, and problem list.    Review of Systems   Constitutional: Negative.    Gastrointestinal:  Negative for  abdominal pain.   Genitourinary:  Positive for genital sores. Negative for decreased urine volume, difficulty urinating, dysuria, frequency, urgency, vaginal bleeding, vaginal discharge and vaginal pain.   Musculoskeletal:  Negative for arthralgias and myalgias.   Skin: Negative.    Hematological:  Negative for adenopathy.   Psychiatric/Behavioral: Negative.     All other systems reviewed and are negative.        Objective:      There were no vitals taken for this visit.    Left before vitals were taken. Mobility was limited and could not safely stand on the scale.      Physical Exam  Vitals and nursing note reviewed.   Constitutional:       Appearance: Normal appearance. She is well-developed.   Genitourinary:     Exam position: Lithotomy position.      Labia:         Right: Tenderness present. No rash, lesion or injury.         Left: Tenderness present. No rash, lesion or injury.       Urethra: No prolapse, urethral pain, urethral swelling or urethral lesion.      Vagina: No signs of injury and foreign body. Vaginal discharge (thick white discharge) present. No erythema, tenderness or bleeding.      Rectum: No external hemorrhoid.          Comments: Left labia minora  erosion noted along with erythema in vestibule  Uterus is surgically absent  Lymphadenopathy:      Lower Body: No right inguinal adenopathy. No left inguinal adenopathy.   Skin:     General: Skin is warm and dry.   Neurological:      Mental Status: She is alert and oriented to person, place, and time.   Psychiatric:         Mood and Affect: Mood normal.         Behavior: Behavior normal.

## 2024-03-15 RX ORDER — CLOTRIMAZOLE AND BETAMETHASONE DIPROPIONATE 10; .64 MG/G; MG/G
CREAM TOPICAL 2 TIMES DAILY
Qty: 45 G | Refills: 0 | Status: SHIPPED | OUTPATIENT
Start: 2024-03-15 | End: 2024-03-22

## 2024-03-26 ENCOUNTER — HOSPITAL ENCOUNTER (OUTPATIENT)
Dept: NON INVASIVE DIAGNOSTICS | Facility: CLINIC | Age: 79
Discharge: HOME | End: 2024-03-26
Payer: COMMERCIAL

## 2024-03-26 DIAGNOSIS — I70.211 ATHEROSCLEROSIS OF NATIVE ARTERY OF RIGHT LOWER EXTREMITY WITH INTERMITTENT CLAUDICATION (HCC): Chronic | ICD-10-CM

## 2024-03-26 PROCEDURE — 93923 UPR/LXTR ART STDY 3+ LVLS: CPT

## 2024-03-26 PROCEDURE — 93922 UPR/L XTREMITY ART 2 LEVELS: CPT | Performed by: SURGERY

## 2024-03-26 PROCEDURE — 93925 LOWER EXTREMITY STUDY: CPT | Performed by: SURGERY

## 2024-03-26 PROCEDURE — 93925 LOWER EXTREMITY STUDY: CPT

## 2024-04-01 NOTE — PROGRESS NOTES
"Assessment/Plan:  Follow up with PCP for elevated BP.   Bathe daily with dove bar soap. Rinse well and pat dry.  Nystatn triamcinolone ointment to affected area twice daily for one week.  Coconut oil to vulva for protection.   Rinse vulva with water after urinating.  Avoid scratching.   No sex during treatment.   Healthy diet with adequate hydration encouraged.   Call with any worsening of symptoms.   Discussed consideration of vulvar biopsy with persistence of symptoms.        1. Vulvar irritation    2. Essential hypertension               Subjective:      Patient ID: Rebeca Mccartney is a 78 y.o. female.    HPI    Rebeca Mccartney is a 78 y.o.  female who is here today for a follow up visit . Admits to severe white coat syndrome and typically takes xanax prior to appts. She did not take it this am as she needed it last night. BP of 162/102. Asymptomatic.Admits to compliance with her HTN medication. Due for pain medication around 1300.     Last seen in office on 3/14/24 and treated for yeast dermatitis. \"Left labia minora erosion noted along with erythema in vestibule\".   She was recovering at that time from a femur fracture.   She feels her symptoms improved \"really good\" until last night. Last night she started noticing external vulvar burning after voiding. She washed with soap and water last night and again this morning and now her symptoms are slightly improved.    She is not using the coconut oil  s recommended and is unable to rinse her vulva after each void due to her thigh high stockings getting wet.     The following portions of the patient's history were reviewed and updated as appropriate: allergies, current medications, past family history, past medical history, past social history, past surgical history, and problem list.    Review of Systems   Constitutional: Negative.    Genitourinary:  Positive for genital sores. Negative for difficulty urinating, dysuria, vaginal bleeding, vaginal discharge and " vaginal pain.   Musculoskeletal:  Negative for myalgias.   Skin: Negative.    Psychiatric/Behavioral: Negative.     All other systems reviewed and are negative.        Objective:      BP (!) 162/102 (BP Location: Left arm, Patient Position: Sitting, Cuff Size: Standard)   Wt 86.6 kg (191 lb)   BMI 28.21 kg/m²          Physical Exam  Vitals and nursing note reviewed.   Constitutional:       Appearance: Normal appearance. She is well-developed.   Genitourinary:     Exam position: Lithotomy position.      Labia:         Right: Tenderness present. No rash, lesion or injury.         Left: Tenderness present. No rash, lesion or injury.       Urethra: No prolapse, urethral pain, urethral swelling or urethral lesion.      Vagina: No signs of injury and foreign body. No vaginal discharge (thick white discharge), erythema, tenderness or bleeding.      Rectum: No external hemorrhoid.          Comments: Left labia minora erosion  improved/ less deep as marked on diagram.   Slight  erythema in vestibule  Uterus is surgically absent  Lymphadenopathy:      Lower Body: No right inguinal adenopathy. No left inguinal adenopathy.   Skin:     General: Skin is warm and dry.   Neurological:      Mental Status: She is alert and oriented to person, place, and time.   Psychiatric:         Mood and Affect: Mood normal.         Behavior: Behavior normal.

## 2024-04-02 ENCOUNTER — OFFICE VISIT (OUTPATIENT)
Dept: OBGYN CLINIC | Facility: MEDICAL CENTER | Age: 79
End: 2024-04-02
Payer: COMMERCIAL

## 2024-04-02 VITALS — WEIGHT: 191 LBS | DIASTOLIC BLOOD PRESSURE: 102 MMHG | SYSTOLIC BLOOD PRESSURE: 162 MMHG | BODY MASS INDEX: 28.21 KG/M2

## 2024-04-02 DIAGNOSIS — I10 ESSENTIAL HYPERTENSION: ICD-10-CM

## 2024-04-02 DIAGNOSIS — N90.89 VULVAR IRRITATION: Primary | ICD-10-CM

## 2024-04-02 PROCEDURE — 99213 OFFICE O/P EST LOW 20 MIN: CPT | Performed by: NURSE PRACTITIONER

## 2024-04-02 NOTE — PATIENT INSTRUCTIONS
Follow up with PCP for elevated BP.   Bathe daily with dove bar soap. Rinse well and pat dry.  Nystatn triamcinolone ointment to affected area twice daily for one week.  Coconut oil to vulva for protection.   Rinse vulva with water after urinating.  Avoid scratching.   No sex during treatment.   Healthy diet with adequate hydration encouraged.   Call with any worsening of symptoms.   Discussed consideration of vulvar biopsy with persistence of symptoms.

## 2024-04-03 ENCOUNTER — OFFICE VISIT (OUTPATIENT)
Dept: VASCULAR SURGERY | Facility: CLINIC | Age: 79
End: 2024-04-03
Payer: COMMERCIAL

## 2024-04-03 ENCOUNTER — TELEPHONE (OUTPATIENT)
Dept: VASCULAR SURGERY | Facility: CLINIC | Age: 79
End: 2024-04-03

## 2024-04-03 ENCOUNTER — HOSPITAL ENCOUNTER (OUTPATIENT)
Dept: NON INVASIVE DIAGNOSTICS | Facility: CLINIC | Age: 79
Discharge: HOME | End: 2024-04-03
Payer: COMMERCIAL

## 2024-04-03 VITALS
DIASTOLIC BLOOD PRESSURE: 90 MMHG | SYSTOLIC BLOOD PRESSURE: 160 MMHG | WEIGHT: 190 LBS | OXYGEN SATURATION: 98 % | HEART RATE: 74 BPM | HEIGHT: 69 IN | BODY MASS INDEX: 28.14 KG/M2

## 2024-04-03 DIAGNOSIS — R60.0 EDEMA OF BOTH LOWER EXTREMITIES: ICD-10-CM

## 2024-04-03 DIAGNOSIS — I73.9 PAD (PERIPHERAL ARTERY DISEASE) (HCC): ICD-10-CM

## 2024-04-03 DIAGNOSIS — I70.211 ATHEROSCLEROSIS OF NATIVE ARTERY OF RIGHT LOWER EXTREMITY WITH INTERMITTENT CLAUDICATION (HCC): Primary | ICD-10-CM

## 2024-04-03 DIAGNOSIS — I70.223 ATHEROSCLEROSIS OF NATIVE ARTERIES OF EXTREMITIES WITH REST PAIN, BILATERAL LEGS (HCC): ICD-10-CM

## 2024-04-03 DIAGNOSIS — I87.2 VENOUS INSUFFICIENCY OF BOTH LOWER EXTREMITIES: ICD-10-CM

## 2024-04-03 PROCEDURE — 93970 EXTREMITY STUDY: CPT | Performed by: SURGERY

## 2024-04-03 PROCEDURE — 99214 OFFICE O/P EST MOD 30 MIN: CPT | Performed by: NURSE PRACTITIONER

## 2024-04-03 PROCEDURE — 93970 EXTREMITY STUDY: CPT

## 2024-04-03 NOTE — PATIENT INSTRUCTIONS
Bilateral lower extremity venous duplex rule out DVT.  I will call you with results and follow-up if needed    Lower extremity arterial duplex in 1 year with return office visit  Continue Eliquis  Continue statin      Conservative measures with daily compression on a.m., off in p.m. 20-30 mmHg  Ambulation as tolerated, ankle exercises  And leg elevation at rest  Daily moisturizer

## 2024-04-03 NOTE — PROGRESS NOTES
"Assessment/Plan:    Edema of both lower extremities  78-year-old female with DM type II, HTN, PAD with known R SFA occlusion and remote h/o DCB SFA PTA, venous insufficiency and recent left femur fracture/ORIF 12/14/2023 presents with complaints of bilateral lower extremity edema x 3.5 months.    -Patient complains of BLE edema since left femur surgery ORIF 12/14/2023.   -Denies CP/SOB  -Denies pain however reports legs feeling \"wet\"  -No open wounds or ulcerations.  No blisters weeping or drainage.  -Patient has been compliant with 15-20 mmHg thigh-high compression x 1 month without significant change in edema  -She is currently on Eliquis for A-fib and reports not missing any dosages  -Denies claudication, rest pain or wounds  -Doppler DP/PT pulses bilaterally    Plan:  -Bilateral LE V for completeness to rule out acute/chronic DVT.  No venous imaging since beginning of edema s/p surgery and sedentary lifestyle.  Will call with any significant findings  -Continue daily compression.  On a.m., off in p.m. recommend 20-30 mmHg.  -Increase ambulation as tolerated.  Encourage daily ankle exercises.  Discussed in office  -Leg elevation at rest  -Follow-up with cardiology as scheduled  -Continue Eliquis  -Continue statin  -Call or return to office with new or worsening symptoms, questions or concerns.    PAD (peripheral artery disease) (HCC)  -Known PAD.  Asymptomatic.  Denies claudication, rest pain or wounds however minimally ambulatory secondary to recent left femur fracture and ORIF.  -Patient is walking with walker    LEAD obtained early secondary to lower extremity complaints.  3/26/24  RIGHT: Diffuse disease of the femoral-popliteal segment with multiple short segment occlusion of the proximal to mid superficial femoral artery.  JOANA 0.72/108/74    LEFT: Diffuse disease throughout the femoral-popliteal arteries with a < 50 % stenosis of the mid superficial femoral artery. JOANA 1.35/170/164      Plan:  -Continued " surveillance with q. yearly lower extremity arterial duplex imaging  - LEAD in 1 year with return office visit  -Continue Eliquis   -Continue statin  -Call or return to office with new or worsening lower extremity symptoms, claudication, rest pain or wounds       Diagnoses and all orders for this visit:    Atherosclerosis of native artery of right lower extremity with intermittent claudication (HCC)  -     VAS ARTERIAL DUPLEX- LOWER LIMB BILATERAL; Future    Edema of both lower extremities  -      VAS VENOUS DUPLEX - LOWER LIMB BILATERAL; Future  -     Compression Stocking    Venous insufficiency of both lower extremities  -     Compression Stocking    PAD (peripheral artery disease) (HCC)  -     VAS ARTERIAL DUPLEX- LOWER LIMB BILATERAL; Future    Atherosclerosis of native arteries of extremities with rest pain, bilateral legs (HCC)          Subjective:      Patient ID: Rebeca Mccartney is a 78 y.o. female.    Patient presents for review of VERN done 3/26/24. She states she broke her left femur and surgery in December, she reports she has had swelling every since. She does wear compression. She is taking Eliquis.     HPI  See assessment and plan    78-year-old female presents with complaints of bilateral lower extremity edema over the last 3 months.  Patient with history of left femur fracture and ORIF in December.  She reports since that time she has had significant bilateral lower extremity edema that is not resolving.  She denies CP or SOB  Patient has been wearing compression 15-20 mmHg over the past month however is not seeing any improvement.  She is maintained on daily Eliquis (A-fib) and reports not missing any dosages  She has no blisters, weeping, drainage or wounds    We discussed pathophysiology of venous insufficiency.  Recommend continued conservative management with daily compression, ambulation as tolerated and/or ankle exercises as discussed in office.  Leg elevation at rest.  Given that she had  "recent surgery and has been sedentary, will evaluate with bilateral lower extremity L EV however low suspicion for DVT secondary to anticoagulation.  This has not been ruled out since reports of swelling since December.    We also reviewed recent arterial duplex imaging without significant change or progression of disease.  Known short segment SFA occlusion on the left.  She denies claudication, rest pain or wounds although she is minimally ambulatory given her left femur fracture.  She is walking with walker.    Will continue routine surveillance of lower extremity arterial duplex in 1 year with return office visit.  Advised to call or return to office with new or worsening symptoms, questions or concerns.  Will call with any significant findings of lower extremity venous duplex.        The following portions of the patient's history were reviewed and updated as appropriate: allergies, current medications, past family history, past medical history, past social history, past surgical history, and problem list.    Review of Systems   Constitutional: Negative.    HENT: Negative.     Eyes: Negative.    Respiratory: Negative.     Cardiovascular: Negative.    Gastrointestinal: Negative.    Musculoskeletal:  Positive for gait problem.        Ambulates with walker   Skin: Negative.    Allergic/Immunologic: Negative.    Neurological:  Negative for dizziness and headaches.   Hematological: Negative.    Psychiatric/Behavioral: Negative.         I have reviewed and made appropriate changes to the review of systems input by the medical assistant.    Objective:      /90 (BP Location: Left arm, Patient Position: Sitting, Cuff Size: Standard)   Pulse 74   Ht 5' 9\" (1.753 m)   Wt 86.2 kg (190 lb)   SpO2 98%   BMI 28.06 kg/m²          Physical Exam  Vitals reviewed.   Constitutional:       General: She is not in acute distress.  HENT:      Head: Normocephalic and atraumatic.   Cardiovascular:      Rate and Rhythm: Normal " "rate. Rhythm irregular.      Pulses:           Dorsalis pedis pulses are detected w/ Doppler on the right side and detected w/ Doppler on the left side.   Pulmonary:      Effort: Pulmonary effort is normal. No respiratory distress.   Musculoskeletal:      Right lower leg: Pitting Edema present.      Left lower leg: Pitting Edema present.      Comments: Walks with walker, cautious   Skin:     General: Skin is warm and dry.      Capillary Refill: Capillary refill takes less than 2 seconds.      Findings: No erythema.   Neurological:      Mental Status: She is alert and oriented to person, place, and time.      Sensory: No sensory deficit.      Motor: No weakness.   Psychiatric:         Behavior: Behavior normal.         I have spent a total time of 40 minutes on 04/03/24 in caring for this patient including Diagnostic results, Risks and benefits of tx options, Instructions for management, Patient and family education, Importance of tx compliance, Risk factor reductions, Impressions, Documenting in the medical record, Reviewing / ordering tests, medicine, procedures  , and Obtaining or reviewing history  .      Vitals:    04/03/24 1330   BP: 160/90   BP Location: Left arm   Patient Position: Sitting   Cuff Size: Standard   Pulse: 74   SpO2: 98%   Weight: 86.2 kg (190 lb)   Height: 5' 9\" (1.753 m)       Patient Active Problem List   Diagnosis    Toe cyanosis    PAD (peripheral artery disease) (HCC)    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)    Onychomycosis    Essential hypertension    Melanoma (HCC)    Thyroid nodule    Sprain of anterior talofibular ligament of left ankle    Acute left ankle pain    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)    Left foot pain    Venous insufficiency of both lower extremities    Edema of both lower extremities       Past Surgical History:   Procedure Laterality Date    HAND SURGERY Right 09/16/2021    HYSTERECTOMY      IR " AORTAGRAM WITH RUN-OFF  11/15/2018    MASTECTOMY      age 33    REPLACEMENT TOTAL KNEE Left     US GUIDED THYROID BIOPSY  07/07/2022       Family History   Problem Relation Age of Onset    Diabetes Mother     Hypertension Mother     Atrial fibrillation Father     Breast cancer Neg Hx        Social History     Socioeconomic History    Marital status: /Civil Union     Spouse name: Not on file    Number of children: Not on file    Years of education: Not on file    Highest education level: Not on file   Occupational History    Not on file   Tobacco Use    Smoking status: Never    Smokeless tobacco: Never   Vaping Use    Vaping status: Never Used   Substance and Sexual Activity    Alcohol use: No    Drug use: No    Sexual activity: Yes     Partners: Male   Other Topics Concern    Not on file   Social History Narrative    Not on file     Social Determinants of Health     Financial Resource Strain: Low Risk  (12/12/2023)    Received from [facility]    Overall Financial Resource Strain (CARDIA)     Difficulty of Paying Living Expenses: Not hard at all   Food Insecurity: No Food Insecurity (12/12/2023)    Received from [facility]    Hunger Vital Sign     Worried About Running Out of Food in the Last Year: Never true     Ran Out of Food in the Last Year: Never true   Transportation Needs: No Transportation Needs (12/12/2023)    Received from [facility]    PRAPARE - Transportation     Lack of Transportation (Medical): No     Lack of Transportation (Non-Medical): No   Physical Activity: Not on file   Stress: Not on file   Social Connections: Not on file   Intimate Partner Violence: Not At Risk (12/12/2023)    Received from [facility]    Humiliation, Afraid, Rape, and Kick questionnaire     Fear of Current or Ex-Partner: No     Emotionally Abused: No     Physically Abused: No     Sexually Abused: No   Housing Stability: Low Risk  (12/12/2023)     Received from [facility]    Housing Stability Vital Sign     Unable to Pay for Housing in the Last Year: No     Number of Places Lived in the Last Year: 0     Unstable Housing in the Last Year: No       Allergies   Allergen Reactions    Amlodipine GI Intolerance    Atenolol GI Intolerance    Clonidine Other (See Comments)     Patient reports she did not tolerate, unspecified side effects      Conjugated Estrogens Hypertension    Felodipine GI Intolerance and Dizziness     Per pt    Furosemide GI Intolerance    Hydrochlorothiazide W-Triamterene GI Intolerance    Labetalol GI Intolerance and Dizziness     Per pt      Losartan      Headache, nausea    Meperidine     Metoprolol GI Intolerance    Morphine And Related      hallucinations  hallucinations    Neosporin [Neomycin-Bacitracin Zn-Polymyx]      rash  rash    Spironolactone Other (See Comments)     optical migraine    Sudafed [Pseudoephedrine]     Colloidal Oatmeal Rash         Current Outpatient Medications:     acetaminophen (TYLENOL) 325 mg tablet, Take 650 mg by mouth every 6 (six) hours as needed for mild pain, Disp: , Rfl:     ALPRAZolam (XANAX) 0.25 mg tablet, Take 0.25 mg by mouth as needed, Disp: , Rfl:     azithromycin (ZITHROMAX) 250 mg tablet, For dental procedures, Disp: , Rfl:     diphenhydrAMINE (BENADRYL) 25 mg tablet, Take 25 mg by mouth 2 (two) times a day, Disp: , Rfl:     Eliquis 5 MG, Take 5 mg by mouth 2 (two) times a day, Disp: , Rfl:     glimepiride (AMARYL) 4 mg tablet, Take 4 mg by mouth 2 (two) times a day, Disp: , Rfl:     Levemir FlexTouch 100 units/mL injection pen, INJECT 15 UNITS UNDER THE SKIN NIGHTLY, Disp: , Rfl:     lovastatin (MEVACOR) 40 MG tablet, , Disp: , Rfl:     miconazole (MONISTAT-7) 2 % vaginal cream, Insert 1 applicator into the vagina daily at bedtime, Disp: 45 g, Rfl: 0    aspirin (ECOTRIN LOW STRENGTH) 81 mg EC tablet, Take 81 mg by mouth daily (Patient not taking: Reported on 9/20/2023), Disp: ,  Rfl:     ciclopirox (LOPROX) 0.77 % cream, , Disp: , Rfl:     clotrimazole-betamethasone (LOTRISONE) 1-0.05 % cream, Apply topically 2 (two) times a day for 7 days, Disp: 45 g, Rfl: 0    doxycycline (ADOXA) 100 MG tablet, , Disp: , Rfl:     glimepiride (AMARYL) 4 mg tablet, Take 4 mg by mouth every morning before breakfast (Patient not taking: Reported on 9/20/2023), Disp: , Rfl:     ibuprofen (MOTRIN) 200 mg tablet, Take by mouth every 6 (six) hours as needed for mild pain (Patient not taking: Reported on 9/20/2023), Disp: , Rfl:     linaGLIPtin (Tradjenta) 5 MG TABS, Take 5 mg by mouth, Disp: , Rfl:     lisinopril (ZESTRIL) 40 mg tablet, Take 40 mg by mouth daily (Patient not taking: Reported on 9/20/2023), Disp: , Rfl:     meloxicam (MOBIC) 7.5 mg tablet, , Disp: , Rfl:     metFORMIN (GLUCOPHAGE-XR) 500 mg 24 hr tablet, Take 1 tablet by mouth 2 (two) times a day with meals, Disp: , Rfl:     OneTouch Verio test strip, USE ONE TEST STRIP 1-2X DAILY...MEDICARE ONLY PAYS FOR ONCE DAILY TESTING (Patient not taking: Reported on 9/20/2023), Disp: , Rfl:     pantoprazole (PROTONIX) 20 mg tablet, Take 20 mg by mouth daily, Disp: , Rfl:     pantoprazole (PROTONIX) 20 mg tablet, Take 20 mg by mouth daily (Patient not taking: Reported on 9/20/2023), Disp: , Rfl:     pantoprazole (PROTONIX) 40 mg tablet, Take by mouth (Patient not taking: Reported on 9/20/2023), Disp: , Rfl:     quinapril (ACCUPRIL) 40 MG tablet, Take 40 mg by mouth 2 (two) times a day (Patient not taking: Reported on 9/20/2023), Disp: , Rfl:     ramipril (ALTACE) 5 mg capsule, Take 5 mg by mouth daily, Disp: , Rfl:     ramipril (ALTACE) 5 mg capsule, Take 5 mg by mouth daily (Patient not taking: Reported on 9/20/2023), Disp: , Rfl:     Tradjenta 5 MG TABS, Take 5 mg by mouth daily with breakfast (Patient not taking: Reported on 3/14/2024), Disp: , Rfl:     valsartan (DIOVAN) 80 mg tablet, Take 80 mg by mouth daily (Patient not taking: Reported on 9/20/2023),  Disp: , Rfl:

## 2024-04-03 NOTE — ASSESSMENT & PLAN NOTE
-Known PAD.  Asymptomatic.  Denies claudication, rest pain or wounds however minimally ambulatory secondary to recent left femur fracture and ORIF.  -Patient is walking with walker    LEAD obtained early secondary to lower extremity complaints.  3/26/24  RIGHT: Diffuse disease of the femoral-popliteal segment with multiple short segment occlusion of the proximal to mid superficial femoral artery.  JOANA 0.72/108/74    LEFT: Diffuse disease throughout the femoral-popliteal arteries with a < 50 % stenosis of the mid superficial femoral artery. JOANA 1.35/170/164      Plan:  -Continued surveillance with q. yearly lower extremity arterial duplex imaging  - LEAD in 1 year with return office visit  -Continue Eliquis   -Continue statin  -Call or return to office with new or worsening lower extremity symptoms, claudication, rest pain or wounds

## 2024-04-03 NOTE — ASSESSMENT & PLAN NOTE
"78-year-old female with DM type II, HTN, PAD with known R SFA occlusion and remote h/o DCB SFA PTA, venous insufficiency and recent left femur fracture/ORIF 12/14/2023 presents with complaints of bilateral lower extremity edema x 3.5 months.    -Patient complains of BLE edema since left femur surgery ORIF 12/14/2023.   -Denies CP/SOB  -Denies pain however reports legs feeling \"wet\"  -No open wounds or ulcerations.  No blisters weeping or drainage.  -Patient has been compliant with 15-20 mmHg thigh-high compression x 1 month without significant change in edema  -She is currently on Eliquis for A-fib and reports not missing any dosages  -Denies claudication, rest pain or wounds  -Doppler DP/PT pulses bilaterally    Plan:  -Bilateral LE V for completeness to rule out acute/chronic DVT.  No venous imaging since beginning of edema s/p surgery and sedentary lifestyle.  Will call with any significant findings  -Continue daily compression.  On a.m., off in p.m. recommend 20-30 mmHg.  -Increase ambulation as tolerated.  Encourage daily ankle exercises.  Discussed in office  -Leg elevation at rest  -Follow-up with cardiology as scheduled  -Continue Eliquis  -Continue statin  -Call or return to office with new or worsening symptoms, questions or concerns.  "

## 2024-04-23 ENCOUNTER — APPOINTMENT (OUTPATIENT)
Dept: LAB | Facility: MEDICAL CENTER | Age: 79
End: 2024-04-23

## 2024-04-25 ENCOUNTER — APPOINTMENT (OUTPATIENT)
Dept: LAB | Facility: MEDICAL CENTER | Age: 79
End: 2024-04-25
Payer: COMMERCIAL

## 2024-04-25 DIAGNOSIS — Z79.4 ENCOUNTER FOR LONG-TERM (CURRENT) USE OF INSULIN (HCC): ICD-10-CM

## 2024-04-25 DIAGNOSIS — E55.9 VITAMIN D DEFICIENCY: ICD-10-CM

## 2024-04-25 DIAGNOSIS — E11.9 TYPE 2 DIABETES MELLITUS WITHOUT COMPLICATION, WITH LONG-TERM CURRENT USE OF INSULIN (HCC): ICD-10-CM

## 2024-04-25 DIAGNOSIS — Z79.4 TYPE 2 DIABETES MELLITUS WITHOUT COMPLICATION, WITH LONG-TERM CURRENT USE OF INSULIN (HCC): ICD-10-CM

## 2024-04-25 LAB
25(OH)D3 SERPL-MCNC: <7 NG/ML (ref 30–100)
ALBUMIN SERPL BCP-MCNC: 4.2 G/DL (ref 3.5–5)
ALP SERPL-CCNC: 81 U/L (ref 34–104)
ALT SERPL W P-5'-P-CCNC: 16 U/L (ref 7–52)
ANION GAP SERPL CALCULATED.3IONS-SCNC: 7 MMOL/L (ref 4–13)
AST SERPL W P-5'-P-CCNC: 16 U/L (ref 13–39)
BILIRUB SERPL-MCNC: 0.62 MG/DL (ref 0.2–1)
BUN SERPL-MCNC: 18 MG/DL (ref 5–25)
CALCIUM SERPL-MCNC: 9.6 MG/DL (ref 8.4–10.2)
CHLORIDE SERPL-SCNC: 108 MMOL/L (ref 96–108)
CHOLEST SERPL-MCNC: 100 MG/DL
CO2 SERPL-SCNC: 28 MMOL/L (ref 21–32)
CREAT SERPL-MCNC: 1.04 MG/DL (ref 0.6–1.3)
CREAT UR-MCNC: 71.7 MG/DL
EST. AVERAGE GLUCOSE BLD GHB EST-MCNC: 154 MG/DL
GFR SERPL CREATININE-BSD FRML MDRD: 50 ML/MIN/1.73SQ M
GLUCOSE P FAST SERPL-MCNC: 134 MG/DL (ref 65–99)
HBA1C MFR BLD: 7 %
HDLC SERPL-MCNC: 39 MG/DL
LDLC SERPL CALC-MCNC: 47 MG/DL (ref 0–100)
MICROALBUMIN UR-MCNC: 23.7 MG/L
MICROALBUMIN/CREAT 24H UR: 33 MG/G CREATININE (ref 0–30)
NONHDLC SERPL-MCNC: 61 MG/DL
POTASSIUM SERPL-SCNC: 4.7 MMOL/L (ref 3.5–5.3)
PROT SERPL-MCNC: 7.1 G/DL (ref 6.4–8.4)
SODIUM SERPL-SCNC: 143 MMOL/L (ref 135–147)
TRIGL SERPL-MCNC: 72 MG/DL
TSH SERPL DL<=0.05 MIU/L-ACNC: 2.63 UIU/ML (ref 0.45–4.5)

## 2024-04-25 PROCEDURE — 82043 UR ALBUMIN QUANTITATIVE: CPT

## 2024-04-25 PROCEDURE — 82570 ASSAY OF URINE CREATININE: CPT

## 2024-04-25 PROCEDURE — 80053 COMPREHEN METABOLIC PANEL: CPT

## 2024-04-25 PROCEDURE — 36415 COLL VENOUS BLD VENIPUNCTURE: CPT

## 2024-04-25 PROCEDURE — 84443 ASSAY THYROID STIM HORMONE: CPT

## 2024-04-25 PROCEDURE — 80061 LIPID PANEL: CPT

## 2024-04-25 PROCEDURE — 82306 VITAMIN D 25 HYDROXY: CPT

## 2024-04-25 PROCEDURE — 83036 HEMOGLOBIN GLYCOSYLATED A1C: CPT

## 2024-04-30 PROBLEM — Z85.820 PERSONAL HISTORY OF MALIGNANT MELANOMA: Status: ACTIVE | Noted: 2021-03-25

## 2024-05-03 ENCOUNTER — PROCEDURE VISIT (OUTPATIENT)
Dept: OBGYN CLINIC | Facility: MEDICAL CENTER | Age: 79
End: 2024-05-03
Payer: COMMERCIAL

## 2024-05-03 VITALS
SYSTOLIC BLOOD PRESSURE: 152 MMHG | HEIGHT: 66 IN | WEIGHT: 183.8 LBS | BODY MASS INDEX: 29.54 KG/M2 | DIASTOLIC BLOOD PRESSURE: 86 MMHG

## 2024-05-03 DIAGNOSIS — N90.89 VULVAR IRRITATION: Primary | ICD-10-CM

## 2024-05-03 PROCEDURE — 88312 SPECIAL STAINS GROUP 1: CPT | Performed by: PATHOLOGY

## 2024-05-03 PROCEDURE — 88305 TISSUE EXAM BY PATHOLOGIST: CPT | Performed by: PATHOLOGY

## 2024-05-03 PROCEDURE — 56605 BIOPSY OF VULVA/PERINEUM: CPT | Performed by: NURSE PRACTITIONER

## 2024-05-03 PROCEDURE — 88344 IMHCHEM/IMCYTCHM EA MLT ANTB: CPT | Performed by: PATHOLOGY

## 2024-05-03 PROCEDURE — 88341 IMHCHEM/IMCYTCHM EA ADD ANTB: CPT | Performed by: PATHOLOGY

## 2024-05-03 PROCEDURE — 88342 IMHCHEM/IMCYTCHM 1ST ANTB: CPT | Performed by: PATHOLOGY

## 2024-05-03 RX ORDER — BENZONATATE 100 MG/1
CAPSULE ORAL
COMMUNITY
Start: 2024-04-23 | End: 2024-05-03

## 2024-05-03 NOTE — PATIENT INSTRUCTIONS
Biopsy care discussed (keep surgical site clean and dry). Dry sterile dressing applied. Use antibacterial soap to clean once daily starting tomorrow. Can use antibiotic ointment to area twice daily for 3 days.. Call office with sudden increase in pain, bleeding, malodorous discharge. No sex until healed and avoid rubbing. Rebeca instructed to call with signs or symptoms of infection and to follow up as instructed.

## 2024-05-03 NOTE — PROGRESS NOTES
"Biopsy    Date/Time: 5/3/2024 11:58 AM    Performed by: SVETLANA Estrada  Authorized by: SVETLANA Estrada  Universal Protocol:  Consent: Verbal consent obtained. Written consent obtained.  Risks and benefits: risks, benefits and alternatives were discussed  Consent given by: patient  Time out: Immediately prior to procedure a \"time out\" was called to verify the correct patient, procedure, equipment, support staff and site/side marked as required.  Timeout called at: 5/3/2024 11:58 AM.  Patient understanding: patient states understanding of the procedure being performed  Patient consent: the patient's understanding of the procedure matches consent given  Procedure consent: procedure consent matches procedure scheduled  Relevant documents: relevant documents present and verified  Site marked: the operative site was marked  Patient identity confirmed: verbally with patient    Procedure Details - Lesion Biopsy:     Body area:  Anogenital    Anogenital location:  Vulva (right lower vestibule)    Biopsy method: punch biopsy      Biopsy tissue type: skin    Initial size (mm):  5    Final defect size (mm):  5     Evaluated in office on 3/14 (yeast dermatitis) and 4/2 (Left labia minora erosion  improved/ less deep  compared to prior visit. Slight  erythema in vestibule). Here today for persistence of symptoms.     Rebeca presented for vulvar biopsy. Area was cleaned with betadine. Local anesthesia was achieved with injecting a small amount of 2% lidocaine with epinephrine. A Keye's type sterile punch biopsy was obtained and submitted to pathology 5 mm sized. Hemostasis was obtained with pressure and use of 2 silver nitrate sticks. Rebeca tolerated procedure well.     "

## 2024-05-07 ENCOUNTER — TELEPHONE (OUTPATIENT)
Age: 79
End: 2024-05-07

## 2024-05-07 ENCOUNTER — HOSPITAL ENCOUNTER (OUTPATIENT)
Dept: RADIOLOGY | Facility: MEDICAL CENTER | Age: 79
Discharge: HOME | End: 2024-05-07
Payer: COMMERCIAL

## 2024-05-07 DIAGNOSIS — E04.1 THYROID NODULE: ICD-10-CM

## 2024-05-07 PROCEDURE — 76536 US EXAM OF HEAD AND NECK: CPT

## 2024-05-07 NOTE — TELEPHONE ENCOUNTER
Patient called regarding the cream she got after her biopsy from [facility].  She said she ran out and would like more.  She said she has an appointment near by this morning and would like to stop in the office and get more packets.

## 2024-05-07 NOTE — TELEPHONE ENCOUNTER
Sorry I am just seeing this note. She doesn't typically need to use it more than 3 days for just twice per day. She could use vaseline if needed to prevent it from sticking at her clothing.

## 2024-05-09 ENCOUNTER — OFFICE VISIT (OUTPATIENT)
Dept: OBGYN CLINIC | Facility: MEDICAL CENTER | Age: 79
End: 2024-05-09
Payer: COMMERCIAL

## 2024-05-09 VITALS — DIASTOLIC BLOOD PRESSURE: 88 MMHG | BODY MASS INDEX: 29.99 KG/M2 | SYSTOLIC BLOOD PRESSURE: 136 MMHG | WEIGHT: 183 LBS

## 2024-05-09 DIAGNOSIS — Z09 FOLLOW-UP EXAM: Primary | ICD-10-CM

## 2024-05-09 PROCEDURE — 99213 OFFICE O/P EST LOW 20 MIN: CPT | Performed by: NURSE PRACTITIONER

## 2024-05-09 NOTE — PATIENT INSTRUCTIONS
Bathe daily with dove bar soap. Rinse well and pat dry.   Keep incision open to air.  Rinse well after urinating or having a bowel movement.   Return to office in one week for follow up.   Call office with any signs of infection such as increased pain, bleeding, drainage or fever.   Biopsy results will be released once they return.

## 2024-05-09 NOTE — PROGRESS NOTES
Assessment/Plan:  Bathe daily with dove bar soap. Rinse well and pat dry.   Keep incision open to air.  Rinse well after urinating or having a bowel movement.   Return to office in one week for follow up.   Call office with any signs of infection such as increased pain, bleeding, drainage or fever.   Biopsy results will be released once they return.        1. Follow-up exam               Subjective:      Patient ID: Rebeca Mccartney is a 78 y.o. female.    HPI    Rebeca Mccartney is a 78 y.o.  female who is here today for a follow up visit.  No health concerns.   Last in office on 5/3/24 for vulvar biopsy  for left labia erosion. Pathology not returned as of yet.     Her  has been applying an antibiotic ointment twice daily since her biopsy. The site is tender to touch. No bleeding or drainage. No fever.     The following portions of the patient's history were reviewed and updated as appropriate: allergies, current medications, past family history, past medical history, past social history, past surgical history, and problem list.    Review of Systems   Constitutional:  Negative for activity change, chills and fever.   Genitourinary:  Negative for genital sores.   Musculoskeletal: Negative.    Hematological:  Negative for adenopathy.   Psychiatric/Behavioral: Negative.           Objective:      /88 (BP Location: Left arm, Patient Position: Sitting, Cuff Size: Standard)   Wt 83 kg (183 lb)   BMI 29.99 kg/m²          Physical Exam  Vitals and nursing note reviewed.   Constitutional:       Appearance: Normal appearance. She is well-developed.   Genitourinary:     Exam position: Lithotomy position.      Labia:         Right: No rash, tenderness, lesion or injury.         Left: Tenderness present. No rash, lesion or injury.       Urethra: No prolapse, urethral pain, urethral swelling or urethral lesion.          Comments: Biopsy site has slight green drainage  Area cleaned with hibiclens and NSS. No  further drainage noted or signs of infection. Slightly tender to touch.   Lymphadenopathy:      Lower Body: No right inguinal adenopathy. No left inguinal adenopathy.   Skin:     General: Skin is warm and dry.   Neurological:      Mental Status: She is alert and oriented to person, place, and time.   Psychiatric:         Mood and Affect: Mood normal.         Behavior: Behavior normal.

## 2024-05-13 PROCEDURE — 88341 IMHCHEM/IMCYTCHM EA ADD ANTB: CPT | Performed by: PATHOLOGY

## 2024-05-13 PROCEDURE — 88344 IMHCHEM/IMCYTCHM EA MLT ANTB: CPT | Performed by: PATHOLOGY

## 2024-05-13 PROCEDURE — 88342 IMHCHEM/IMCYTCHM 1ST ANTB: CPT | Performed by: PATHOLOGY

## 2024-05-13 PROCEDURE — 88305 TISSUE EXAM BY PATHOLOGIST: CPT | Performed by: PATHOLOGY

## 2024-05-13 PROCEDURE — 88312 SPECIAL STAINS GROUP 1: CPT | Performed by: PATHOLOGY

## 2024-05-15 ENCOUNTER — OFFICE VISIT (OUTPATIENT)
Dept: SURGICAL ONCOLOGY | Facility: CLINIC | Age: 79
End: 2024-05-15
Payer: COMMERCIAL

## 2024-05-15 VITALS
TEMPERATURE: 98.4 F | BODY MASS INDEX: 31.02 KG/M2 | HEART RATE: 104 BPM | DIASTOLIC BLOOD PRESSURE: 90 MMHG | WEIGHT: 193 LBS | HEIGHT: 66 IN | SYSTOLIC BLOOD PRESSURE: 160 MMHG | OXYGEN SATURATION: 96 %

## 2024-05-15 DIAGNOSIS — Z85.820 ENCOUNTER FOR FOLLOW-UP SURVEILLANCE OF MELANOMA: Primary | ICD-10-CM

## 2024-05-15 DIAGNOSIS — E04.1 THYROID NODULE: ICD-10-CM

## 2024-05-15 DIAGNOSIS — Z85.820 PERSONAL HISTORY OF MALIGNANT MELANOMA: ICD-10-CM

## 2024-05-15 DIAGNOSIS — Z08 ENCOUNTER FOR FOLLOW-UP SURVEILLANCE OF MELANOMA: Primary | ICD-10-CM

## 2024-05-15 PROCEDURE — G2211 COMPLEX E/M VISIT ADD ON: HCPCS

## 2024-05-15 PROCEDURE — 99213 OFFICE O/P EST LOW 20 MIN: CPT

## 2024-05-15 RX ORDER — RAMIPRIL 10 MG/1
CAPSULE ORAL
COMMUNITY
Start: 2024-05-04

## 2024-05-15 NOTE — LETTER
May 15, 2024     John Escalante MD  [address] [facility]  2nd Floor  [facility] [address]    Patient: Rebeca Mccartney   YOB: 1945   Date of Visit: 5/15/2024       Dear Dr. Escalante:    Thank you for referring Rebeca Mccartney to me for evaluation. Below are my notes for this consultation.    If you have questions, please do not hesitate to call me. I look forward to following your patient along with you.         Sincerely,        SVETLANA Lynn        CC: No Recipients    SVETLANA Lynn  5/15/2024 12:38 PM  Sign when Signing Visit               Surgical Oncology Follow Up       [address] [place] [address] SURGICAL ONCOLOGY [address] [address]    Rebeca Mccartney  1945  [id number]  [address] [facility] SURGICAL ONCOLOGY [place]  [address]  [place] [address]    Diagnoses and all orders for this visit:    Encounter for follow-up surveillance of melanoma    Personal history of malignant melanoma    Thyroid nodule  -     US thyroid; Future    Other orders  -     ramipril (ALTACE) 10 MG capsule        Chief Complaint   Patient presents with   • Follow-up       Return in about 1 year (around 5/15/2025) for Office Visit, Imaging - See orders.      Oncology History   Personal history of malignant melanoma   3/18/2021 Initial Diagnosis    Melanoma (HCC)     3/18/2021 Biopsy    Left upper back shave biopsy:  Malignant melanoma   - at least 0.4 mm thickness   - 0 mitosis   - negative ulceration     4/20/2021 Surgery    In office wide excision  A. Skin, left upper back, excision:  - Residual melanoma in-situ; prior biopsy site reaction.  - Atypical lentiginous compound nevus.  - Inked margins with no tumor seen     4/20/2021 -  Cancer Staged    Staging form: Melanoma of the Skin, AJCC 8th Edition  - Pathologic stage from 4/20/2021: Stage IA (pT1a, cN0, cM0) - Signed by SVETLANA Lynn on 4/30/2024  Stage  prefix: Initial diagnosis           Staging: [id number]  Melanoma of the left upper back, April 2021  Treatment history:  Wide excision left upper back melanoma, April 2021  Left thyroid nodule biopsy, July 2022, AUS [place] III: Afirma was benign  Current treatment:  observation  Disease status: ANDRA      History of Present Illness: The patient returns to the office today in follow-up for her history of a stage IA melanoma as well as a thyroid nodule.  She is currently ANDRA at 3 years from melanoma diagnosis. She recently saw her dermatologist and has 2 lesions on her face treated.  She denies any other lumps or lesions.  She is recovering from a broken left femur and has been experiencing lower extremity swelling.  She denies any new headaches, dizziness or weight loss.  She has no complaints referable to the thyroid, and does not report any new dysphagia or voice changes. Thyroid US was performed on May 7.  I have reviewed these results myself and discussed them with the patient today.      Review of Systems   Constitutional:  Positive for activity change. Negative for appetite change, fatigue and unexpected weight change.   HENT:  Positive for congestion and sneezing. Negative for trouble swallowing and voice change.    Respiratory:  Positive for cough.    Cardiovascular: Negative.    Gastrointestinal: Negative.  Negative for abdominal pain, nausea and vomiting.   Musculoskeletal:  Positive for arthralgias and gait problem.   Skin:  Positive for wound (bilateral cheeks).   Neurological:  Negative for dizziness and headaches.   Hematological: Negative.  Negative for adenopathy.   Psychiatric/Behavioral: Negative.               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)   • Left foot pain   • Venous insufficiency of both lower extremities   • Edema of both lower extremities   • Encounter for follow-up surveillance of melanoma     Past Medical History:   Diagnosis Date   • Cancer (HCC)    • Diabetes mellitus (HCC)    • HTN (hypertension)    • Hypertension    • Melanoma of face (HCC)      Past Surgical History:   Procedure Laterality Date   • HAND SURGERY Right 09/16/2021   • HYSTERECTOMY     • IR AORTAGRAM WITH RUN-OFF  11/15/2018   • MASTECTOMY      age 33   • REPLACEMENT TOTAL KNEE Left    • US GUIDED THYROID BIOPSY  07/07/2022     Family History   Problem Relation Age of Onset   • Diabetes Mother    • Hypertension Mother    • Atrial fibrillation Father    • Breast cancer Neg Hx      Social History     Socioeconomic History   • Marital status: /Civil Union     Spouse name: Not on file   • Number of children: Not on file   • Years of education: Not on file   • Highest education level: Not on file   Occupational History   • Not on file   Tobacco Use   • Smoking status: Never   • Smokeless tobacco: Never   Vaping Use   • Vaping status: Never Used   Substance and Sexual Activity   • Alcohol use: No   • Drug use: No   • Sexual activity: Yes     Partners: Male   Other Topics Concern   • Not on file   Social History Narrative   • Not on file     Social Determinants of Health     Financial Resource Strain: Low Risk  (12/12/2023)    [facility]    Overall Financial Resource Strain (CARDIA)    • Difficulty of Paying Living Expenses: Not hard at all   Food Insecurity: No Food Insecurity (12/12/2023)    [facility]    Hunger Vital Sign    • Worried About Running Out of Food in the Last Year: Never true    • Ran Out of  Food in the Last Year: Never true   Transportation Needs: No Transportation Needs (12/12/2023)    [facility]    PRAPARE - Transportation    • Lack of Transportation (Medical): No    • Lack of Transportation (Non-Medical): No   Physical Activity: Not on file   Stress: Not on file   Social Connections: Not on file   Intimate Partner Violence: Not At Risk (12/12/2023)    [facility]    Humiliation, Afraid, Rape, and Kick questionnaire    • Fear of Current or Ex-Partner: No    • Emotionally Abused: No    • Physically Abused: No    • Sexually Abused: No   Housing Stability: Low Risk  (12/12/2023)    [facility]    Housing Stability Vital Sign    • Unable to Pay for Housing in the Last Year: No    • Number of Places Lived in the Last Year: 0    • Unstable Housing in the Last Year: No       Current Outpatient Medications:   •  acetaminophen (TYLENOL) 325 mg tablet, Take 650 mg by mouth every 6 (six) hours as needed for mild pain, Disp: , Rfl:   •  ALPRAZolam (XANAX) 0.25 mg tablet, Take 0.25 mg by mouth as needed, Disp: , Rfl:   •  diphenhydrAMINE (BENADRYL) 25 mg tablet, Take 25 mg by mouth 2 (two) times a day, Disp: , Rfl:   •  Eliquis 5 MG, Take 5 mg by mouth 2 (two) times a day, Disp: , Rfl:   •  glimepiride (AMARYL) 4 mg tablet, Take 4 mg by mouth 2 (two) times a day, Disp: , Rfl:   •  Levemir FlexTouch 100 units/mL injection pen, , Disp: , Rfl:   •  lovastatin (MEVACOR) 40 MG tablet, , Disp: , Rfl:   •  metFORMIN (GLUCOPHAGE-XR) 500 mg 24 hr tablet, Take 1 tablet by mouth 2 (two) times a day with meals, Disp: , Rfl:   •  OneTouch Verio test strip, , Disp: , Rfl:   •  ramipril (ALTACE) 10 MG capsule, , Disp: , Rfl:   •  ramipril (ALTACE) 5 mg capsule, Take 5 mg by mouth daily, Disp: , Rfl:   •  aspirin (ECOTRIN LOW STRENGTH) 81 mg EC  tablet, Take 81 mg by mouth daily (Patient not taking: Reported on 9/20/2023), Disp: , Rfl:   •  ibuprofen (MOTRIN) 200 mg tablet, Take by mouth every 6 (six) hours as needed for mild pain (Patient not taking: Reported on 9/20/2023), Disp: , Rfl:   •  linaGLIPtin (Tradjenta) 5 MG TABS, Take 5 mg by mouth (Patient not taking: Reported on 5/3/2024), Disp: , Rfl:   •  meloxicam (MOBIC) 7.5 mg tablet, , Disp: , Rfl:   •  pantoprazole (PROTONIX) 20 mg tablet, Take 20 mg by mouth daily (Patient not taking: Reported on 9/20/2023), Disp: , Rfl:   •  pantoprazole (PROTONIX) 40 mg tablet, Take by mouth (Patient not taking: Reported on 9/20/2023), Disp: , Rfl:   •  Tradjenta 5 MG TABS, Take 5 mg by mouth daily with breakfast (Patient not taking: Reported on 3/14/2024), Disp: , Rfl:   •  valsartan (DIOVAN) 80 mg tablet, Take 80 mg by mouth daily (Patient not taking: Reported on 9/20/2023), Disp: , Rfl:   Allergies   Allergen Reactions   • Shellfish Allergy - Food Allergy Anaphylaxis     rash   rash   • Bacitracin Other (See Comments)   • Neomycin Other (See Comments)   • Polymyxin B Other (See Comments)   • Amlodipine GI Intolerance   • Atenolol GI Intolerance   • Clonidine Other (See Comments)     Patient reports she did not tolerate, unspecified side effects     • Conjugated Estrogens Hypertension   • Dog Epithelium Itching   • Felodipine GI Intolerance and Dizziness     Per pt   • Furosemide GI Intolerance   • Grass Pollen(K-O-R-T-Swt Ricahrd) Sneezing   • Hydrochlorothiazide Other (See Comments)   • Hydrochlorothiazide W-Triamterene GI Intolerance   • Labetalol GI Intolerance and Dizziness     Per pt     • Losartan      Headache, nausea   • Meperidine    • Metoprolol GI Intolerance   • Morphine And Related      hallucinations  hallucinations   • Neosporin [Neomycin-Bacitracin Zn-Polymyx]      rash  rash   • Other Sneezing     hallucinations   • Permethrin Other (See Comments)   • Pollen Extract Sneezing   • Spironolactone  Other (See Comments)     optical migraine   • Sudafed [Pseudoephedrine]    • Triamterene Other (See Comments)   • Valsartan Other (See Comments)   • Colloidal Oatmeal Rash     Vitals:    05/15/24 1134   BP: 160/90   Pulse: 104   Temp: 98.4 °F (36.9 °C)   SpO2: 96%       Physical Exam  Vitals reviewed.   Constitutional:       General: She is not in acute distress.     Appearance: Normal appearance. She is not ill-appearing or toxic-appearing.   HENT:      Head: Normocephalic.     Eyes:      General: No scleral icterus.  Neck:      Comments: Palpable, mobile left thyroid nodule  Cardiovascular:      Rate and Rhythm: Normal rate.   Pulmonary:      Effort: Pulmonary effort is normal.   Musculoskeletal:      Cervical back: Normal range of motion and neck supple.      Right lower leg: Edema present.      Left lower leg: Edema present.   Lymphadenopathy:      Cervical: No cervical adenopathy.      Upper Body:      Right upper body: No supraclavicular adenopathy.      Left upper body: No supraclavicular or axillary adenopathy.   Skin:     General: Skin is warm and dry.      Coloration: Skin is not jaundiced.      Findings: No erythema.      Comments: Left back surgical scar is well-healed, without any evidence of nodularity or pigmentation.  No in-transit lesions seen.   Neurological:      General: No focal deficit present.      Mental Status: She is alert and oriented to person, place, and time.   Psychiatric:         Mood and Affect: Mood normal.         Behavior: Behavior normal.         Thought Content: Thought content normal.         Judgment: Judgment normal.         Imaging  US thyroid    Result Date: 5/14/2024  Narrative: THYROID ULTRASOUND INDICATION: E04.1: Nontoxic single thyroid nodule. COMPARISON: Ultrasound thyroid 5/25/2023 TECHNIQUE: Ultrasound of the thyroid was performed with a high frequency linear transducer in transverse and sagittal planes including volumetric imaging sweeps as well as traditional  still imaging technique. FINDINGS: Thyroid texture: Normal homogeneous smooth echotexture. Right lobe: 3.6 x 1.5 x 1.5 cm. Volume 4.0 mL Left lobe: 4.0 x 2.1 x 1.8 cm. Volume 7.4 mL Isthmus: 0.3 cm. Nodule #1. Image 28. LEFT midgland nodule measuring 2.2 x 1.5 x 1.4 cm. Unchanged from prior. COMPOSITION: 2 points, solid or almost completely solid. ECHOGENICITY: 2 points, hypoechoic. SHAPE: 0 points, wider-than-tall. MARGIN: 0 points, smooth. ECHOGENIC FOCI: 0 points, none or large comet-tail artifacts. TI-RADS Classification: TR 4 (4-6 points), FNA if > 1.5 cm. Follow if > 1 cm.     Impression: Stable 2.2 cm left thyroid nodule with previous nonmalignant biopsy pathology. Recommend follow-up ultrasound in 12 to 24 months. Reference: ACR Thyroid Imaging, Reporting and Data System (TI-RADS): White Paper of the ACR TI-RADS Committee. J AM Radha Radiol 2017;14:587-595. Additional recommendations based on American Thyroid Association 2015 guidelines. Resident: MIRNA MARIA I, the attending radiologist, have reviewed the images and agree with the final report above. Workstation performed: [id number]       I personally reviewed and interpreted the above imaging data.    Discussion/Summary: This is an 77 y/o female who presents today for melanoma and thyroid nodule surveillance.  She is doing well and there is no evidence of melanoma recurrence at this time.  The thyroid nodule remains stable on imaging. I will see her again in 1 year for another clinical exam following repeat US, or sooner should the need arise.  She is agreeable to the plan, all questions were answered today.

## 2024-05-15 NOTE — PROGRESS NOTES
Surgical Oncology Follow Up       [facility] ONCOLOGY [place]  [address] [facility] [place] [address]    Rebeca Mccartney  1945  [id number]  [facility] SURGICAL ONCOLOGY [place]  [address] [facility]  [facility] [address]    Diagnoses and all orders for this visit:    Encounter for follow-up surveillance of melanoma    Personal history of malignant melanoma    Thyroid nodule  -     US thyroid; Future    Other orders  -     ramipril (ALTACE) 10 MG capsule        Chief Complaint   Patient presents with   • Follow-up       Return in about 1 year (around 5/15/2025) for Office Visit, Imaging - See orders.      Oncology History   Personal history of malignant melanoma   3/18/2021 Initial Diagnosis    Melanoma (HCC)     3/18/2021 Biopsy    Left upper back shave biopsy:  Malignant melanoma   - at least 0.4 mm thickness   - 0 mitosis   - negative ulceration     4/20/2021 Surgery    In office wide excision  A. Skin, left upper back, excision:  - Residual melanoma in-situ; prior biopsy site reaction.  - Atypical lentiginous compound nevus.  - Inked margins with no tumor seen     4/20/2021 -  Cancer Staged    Staging form: Melanoma of the Skin, AJCC 8th Edition  - Pathologic stage from 4/20/2021: Stage IA (pT1a, cN0, cM0) - Signed by SVETLANA Lynn on 4/30/2024  Stage prefix: Initial diagnosis           Staging: [id number]  Melanoma of the left upper back, April 2021  Treatment history:  Wide excision left upper back melanoma, April 2021  Left thyroid nodule biopsy, July 2022, AUS [place] III: Afirma was benign  Current treatment:  observation  Disease status: ANDRA      History of Present Illness: The patient returns to the office today in follow-up for her history of a stage IA melanoma as well as a thyroid nodule.  She is currently ANDRA at 3 years from melanoma diagnosis. She recently saw her dermatologist and has 2 lesions  on her face treated.  She denies any other lumps or lesions.  She is recovering from a broken left femur and has been experiencing lower extremity swelling.  She denies any new headaches, dizziness or weight loss.  She has no complaints referable to the thyroid, and does not report any new dysphagia or voice changes. Thyroid US was performed on May 7.  I have reviewed these results myself and discussed them with the patient today.      Review of Systems   Constitutional:  Positive for activity change. Negative for appetite change, fatigue and unexpected weight change.   HENT:  Positive for congestion and sneezing. Negative for trouble swallowing and voice change.    Respiratory:  Positive for cough.    Cardiovascular: Negative.    Gastrointestinal: Negative.  Negative for abdominal pain, nausea and vomiting.   Musculoskeletal:  Positive for arthralgias and gait problem.   Skin:  Positive for wound (bilateral cheeks).   Neurological:  Negative for dizziness and headaches.   Hematological: Negative.  Negative for adenopathy.   Psychiatric/Behavioral: Negative.               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)   • Left foot pain   • Venous insufficiency of both lower extremities   • Edema of both lower extremities   • Encounter for follow-up surveillance of melanoma     Past Medical History:   Diagnosis Date   • Cancer (HCC)    • Diabetes mellitus (HCC)    • HTN (hypertension)    • Hypertension    • Melanoma of face (HCC)      Past Surgical History:   Procedure Laterality Date   • HAND SURGERY Right 09/16/2021   •  HYSTERECTOMY     • IR AORTAGRAM WITH RUN-OFF  11/15/2018   • MASTECTOMY      age 33   • REPLACEMENT TOTAL KNEE Left    • US GUIDED THYROID BIOPSY  07/07/2022     Family History   Problem Relation Age of Onset   • Diabetes Mother    • Hypertension Mother    • Atrial fibrillation Father    • Breast cancer Neg Hx      Social History     Socioeconomic History   • Marital status: /Civil Union     Spouse name: Not on file   • Number of children: Not on file   • Years of education: Not on file   • Highest education level: Not on file   Occupational History   • Not on file   Tobacco Use   • Smoking status: Never   • Smokeless tobacco: Never   Vaping Use   • Vaping status: Never Used   Substance and Sexual Activity   • Alcohol use: No   • Drug use: No   • Sexual activity: Yes     Partners: Male   Other Topics Concern   • Not on file   Social History Narrative   • Not on file     Social Determinants of Health     Financial Resource Strain: Low Risk  (12/12/2023)    [facility]    Overall Financial Resource Strain (CARDIA)    • Difficulty of Paying Living Expenses: Not hard at all   Food Insecurity: No Food Insecurity (12/12/2023)    [facility]    Hunger Vital Sign    • Worried About Running Out of Food in the Last Year: Never true    • Ran Out of Food in the Last Year: Never true   Transportation Needs: No Transportation Needs (12/12/2023)    [facility]    PRAPARE - Transportation    • Lack of Transportation (Medical): No    • Lack of Transportation (Non-Medical): No   Physical Activity: Not on file   Stress: Not on file   Social Connections: Not on file   Intimate Partner Violence: Not At Risk (12/12/2023)    [facility]    Humiliation, Afraid, Rape, and Kick questionnaire     • Fear of Current or Ex-Partner: No    • Emotionally Abused: No    • Physically Abused: No    • Sexually Abused: No   Housing Stability: Low Risk  (12/12/2023)    [facility]    Housing Stability Vital Sign    • Unable to Pay for Housing in the Last Year: No    • Number of Places Lived in the Last Year: 0    • Unstable Housing in the Last Year: No       Current Outpatient Medications:   •  acetaminophen (TYLENOL) 325 mg tablet, Take 650 mg by mouth every 6 (six) hours as needed for mild pain, Disp: , Rfl:   •  ALPRAZolam (XANAX) 0.25 mg tablet, Take 0.25 mg by mouth as needed, Disp: , Rfl:   •  diphenhydrAMINE (BENADRYL) 25 mg tablet, Take 25 mg by mouth 2 (two) times a day, Disp: , Rfl:   •  Eliquis 5 MG, Take 5 mg by mouth 2 (two) times a day, Disp: , Rfl:   •  glimepiride (AMARYL) 4 mg tablet, Take 4 mg by mouth 2 (two) times a day, Disp: , Rfl:   •  Levemir FlexTouch 100 units/mL injection pen, , Disp: , Rfl:   •  lovastatin (MEVACOR) 40 MG tablet, , Disp: , Rfl:   •  metFORMIN (GLUCOPHAGE-XR) 500 mg 24 hr tablet, Take 1 tablet by mouth 2 (two) times a day with meals, Disp: , Rfl:   •  OneTouch Verio test strip, , Disp: , Rfl:   •  ramipril (ALTACE) 10 MG capsule, , Disp: , Rfl:   •  ramipril (ALTACE) 5 mg capsule, Take 5 mg by mouth daily, Disp: , Rfl:   •  aspirin (ECOTRIN LOW STRENGTH) 81 mg EC tablet, Take 81 mg by mouth daily (Patient not taking: Reported on 9/20/2023), Disp: , Rfl:   •  ibuprofen (MOTRIN) 200 mg tablet, Take by mouth every 6 (six) hours as needed for mild pain (Patient not taking: Reported on 9/20/2023), Disp: , Rfl:   •  linaGLIPtin (Tradjenta) 5 MG TABS, Take 5 mg by mouth (Patient not taking: Reported on 5/3/2024), Disp: , Rfl:   •  meloxicam (MOBIC) 7.5 mg tablet, , Disp: , Rfl:   •  pantoprazole (PROTONIX) 20 mg tablet, Take 20 mg by mouth daily (Patient not taking: Reported on 9/20/2023), Disp: , Rfl:   •  pantoprazole  (PROTONIX) 40 mg tablet, Take by mouth (Patient not taking: Reported on 9/20/2023), Disp: , Rfl:   •  Tradjenta 5 MG TABS, Take 5 mg by mouth daily with breakfast (Patient not taking: Reported on 3/14/2024), Disp: , Rfl:   •  valsartan (DIOVAN) 80 mg tablet, Take 80 mg by mouth daily (Patient not taking: Reported on 9/20/2023), Disp: , Rfl:   Allergies   Allergen Reactions   • Shellfish Allergy - Food Allergy Anaphylaxis     rash   rash   • Bacitracin Other (See Comments)   • Neomycin Other (See Comments)   • Polymyxin B Other (See Comments)   • Amlodipine GI Intolerance   • Atenolol GI Intolerance   • Clonidine Other (See Comments)     Patient reports she did not tolerate, unspecified side effects     • Conjugated Estrogens Hypertension   • Dog Epithelium Itching   • Felodipine GI Intolerance and Dizziness     Per pt   • Furosemide GI Intolerance   • Grass Pollen(K-O-R-T-Swt Richard) Sneezing   • Hydrochlorothiazide Other (See Comments)   • Hydrochlorothiazide W-Triamterene GI Intolerance   • Labetalol GI Intolerance and Dizziness     Per pt     • Losartan      Headache, nausea   • Meperidine    • Metoprolol GI Intolerance   • Morphine And Related      hallucinations  hallucinations   • Neosporin [Neomycin-Bacitracin Zn-Polymyx]      rash  rash   • Other Sneezing     hallucinations   • Permethrin Other (See Comments)   • Pollen Extract Sneezing   • Spironolactone Other (See Comments)     optical migraine   • Sudafed [Pseudoephedrine]    • Triamterene Other (See Comments)   • Valsartan Other (See Comments)   • Colloidal Oatmeal Rash     Vitals:    05/15/24 1134   BP: 160/90   Pulse: 104   Temp: 98.4 °F (36.9 °C)   SpO2: 96%       Physical Exam  Vitals reviewed.   Constitutional:       General: She is not in acute distress.     Appearance: Normal appearance. She is not ill-appearing or toxic-appearing.   HENT:      Head: Normocephalic.     Eyes:      General: No scleral icterus.  Neck:      Comments: Palpable, mobile  left thyroid nodule  Cardiovascular:      Rate and Rhythm: Normal rate.   Pulmonary:      Effort: Pulmonary effort is normal.   Musculoskeletal:      Cervical back: Normal range of motion and neck supple.      Right lower leg: Edema present.      Left lower leg: Edema present.   Lymphadenopathy:      Cervical: No cervical adenopathy.      Upper Body:      Right upper body: No supraclavicular adenopathy.      Left upper body: No supraclavicular or axillary adenopathy.   Skin:     General: Skin is warm and dry.      Coloration: Skin is not jaundiced.      Findings: No erythema.      Comments: Left back surgical scar is well-healed, without any evidence of nodularity or pigmentation.  No in-transit lesions seen.   Neurological:      General: No focal deficit present.      Mental Status: She is alert and oriented to person, place, and time.   Psychiatric:         Mood and Affect: Mood normal.         Behavior: Behavior normal.         Thought Content: Thought content normal.         Judgment: Judgment normal.         Imaging  US thyroid    Result Date: 5/14/2024  Narrative: THYROID ULTRASOUND INDICATION: E04.1: Nontoxic single thyroid nodule. COMPARISON: Ultrasound thyroid 5/25/2023 TECHNIQUE: Ultrasound of the thyroid was performed with a high frequency linear transducer in transverse and sagittal planes including volumetric imaging sweeps as well as traditional still imaging technique. FINDINGS: Thyroid texture: Normal homogeneous smooth echotexture. Right lobe: 3.6 x 1.5 x 1.5 cm. Volume 4.0 mL Left lobe: 4.0 x 2.1 x 1.8 cm. Volume 7.4 mL Isthmus: 0.3 cm. Nodule #1. Image 28. LEFT midgland nodule measuring 2.2 x 1.5 x 1.4 cm. Unchanged from prior. COMPOSITION: 2 points, solid or almost completely solid. ECHOGENICITY: 2 points, hypoechoic. SHAPE: 0 points, wider-than-tall. MARGIN: 0 points, smooth. ECHOGENIC FOCI: 0 points, none or large comet-tail artifacts. TI-RADS Classification: TR 4 (4-6 points), FNA if > 1.5 cm.  Follow if > 1 cm.     Impression: Stable 2.2 cm left thyroid nodule with previous nonmalignant biopsy pathology. Recommend follow-up ultrasound in 12 to 24 months. Reference: ACR Thyroid Imaging, Reporting and Data System (TI-RADS): White Paper of the ACR TI-RADS Committee. J AM Radha Radiol 2017;14:587-595. Additional recommendations based on American Thyroid Association 2015 guidelines. Resident: MIRNA MARIA I, the attending radiologist, have reviewed the images and agree with the final report above. Workstation performed: [id number]       I personally reviewed and interpreted the above imaging data.    Discussion/Summary: This is an 77 y/o female who presents today for melanoma and thyroid nodule surveillance.  She is doing well and there is no evidence of melanoma recurrence at this time.  The thyroid nodule remains stable on imaging. I will see her again in 1 year for another clinical exam following repeat US, or sooner should the need arise.  She is agreeable to the plan, all questions were answered today.

## 2024-05-16 ENCOUNTER — OFFICE VISIT (OUTPATIENT)
Dept: OBGYN CLINIC | Facility: MEDICAL CENTER | Age: 79
End: 2024-05-16
Payer: COMMERCIAL

## 2024-05-16 VITALS — BODY MASS INDEX: 31.63 KG/M2 | WEIGHT: 193 LBS | DIASTOLIC BLOOD PRESSURE: 92 MMHG | SYSTOLIC BLOOD PRESSURE: 158 MMHG

## 2024-05-16 DIAGNOSIS — N76.3 CHRONIC VULVITIS: ICD-10-CM

## 2024-05-16 DIAGNOSIS — N76.3 CHRONIC VULVITIS: Primary | ICD-10-CM

## 2024-05-16 PROCEDURE — 99213 OFFICE O/P EST LOW 20 MIN: CPT | Performed by: NURSE PRACTITIONER

## 2024-05-16 RX ORDER — CLOBETASOL PROPIONATE 0.5 MG/G
OINTMENT TOPICAL 2 TIMES DAILY
Qty: 45 G | Refills: 1 | Status: SHIPPED | OUTPATIENT
Start: 2024-05-16 | End: 2024-05-17 | Stop reason: ALTCHOICE

## 2024-05-16 NOTE — PROGRESS NOTES
"Assessment/Plan:  Results reviewed with Rebeca and her  Juan David.  Eliminate triggers that cause the irritation. Wear loose underwear, keep nails short to avoid trauma from scratching. Avoid nighttime scratching. If unable to avoid nighttime scratching, call office for nighttime sedation.    Any signs of infection return to office.   Clobetasol rx sent to pharmacy on file.   Use clobetasol sparingly twice daily and follow up in office in 1 month.  Call with any worsening of symptoms.           1. Chronic vulvitis  -     clobetasol (TEMOVATE) 0.05 % ointment; Apply topically 2 (two) times a day             Subjective:      Patient ID: Rebeca Mccartney is a 78 y.o. female.    HPI    Rebeca Mccartney is a 78 y.o.  female who is here today for a follow up visit.  No health concerns.   5/3/24 for vulvar biopsy for left labia erosion.   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 See Note.     -- Increased p53 immunostain basilar/suprabasilar expression.    -- CK17 immunostain positive.    -- p16/Ki-67 and p16 immunostains negative/patchy with increased basilar/suprabasilar        proliferation.      -- Special stains for fungus (PAS) and bacteria (B&H) negative.     -- Special stain for spirochetes (Warthin-Starry) negative; Spirochete (Treponema pallidum)        immunostain negative.   - Negative for malignancy on multiple examined levels.     -- Pankeratin (CKAE1/3) immunostain confirms no invasive carcinoma. \"        Healing well and states that she is feeling better. She bathes each night and has been diligent with vulvar care. She uses coconut oil to vulva as needed.     The following portions of the patient's history were reviewed and updated as appropriate: allergies, current medications, past family history, past medical history, past social history, past surgical " history, and problem list.    Review of Systems   Constitutional: Negative.    Gastrointestinal:  Negative for abdominal pain.   Genitourinary:  Negative for difficulty urinating, genital sores, pelvic pain, vaginal bleeding, vaginal discharge and vaginal pain.   Skin: Negative.    Hematological:  Negative for adenopathy.   Psychiatric/Behavioral: Negative.     All other systems reviewed and are negative.        Objective:      /92 (BP Location: Left arm, Patient Position: Sitting, Cuff Size: Standard)   Wt 87.5 kg (193 lb)   BMI 31.63 kg/m²          Physical Exam  Vitals and nursing note reviewed.   Constitutional:       Appearance: Normal appearance. She is well-developed.   Genitourinary:     Exam position: Lithotomy position.      Labia:         Right: No rash, tenderness, lesion or injury.         Left: No rash, tenderness, lesion or injury.       Urethra: No prolapse, urethral pain, urethral swelling or urethral lesion.          Comments: Left lower labia biopsy site is healing well.   Overall, vulva is appearing healthier with areas of excoriation (above biopsy site) almost resolved.   Lymphadenopathy:      Lower Body: No right inguinal adenopathy. No left inguinal adenopathy.   Skin:     General: Skin is warm and dry.   Neurological:      Mental Status: She is alert and oriented to person, place, and time.   Psychiatric:         Mood and Affect: Mood normal.         Behavior: Behavior normal.

## 2024-05-16 NOTE — PATIENT INSTRUCTIONS
Results reviewed with Rebeca and her  Juan David.  Eliminate triggers that cause the irritation. Wear loose underwear, keep nails short to avoid trauma from scratching. Avoid nighttime scratching. If unable to avoid nighttime scratching, call office for nighttime sedation.    Any signs of infection return to office.   Clobetasol rx sent to pharmacy on file.   Use clobetasol sparingly twice daily and follow up in office in 1 month.  Call with any worsening of symptoms.

## 2024-05-17 DIAGNOSIS — N76.3 CHRONIC VULVITIS: ICD-10-CM

## 2024-05-17 RX ORDER — BETAMETHASONE DIPROPIONATE 0.05 %
OINTMENT (GRAM) TOPICAL
Qty: 45 G | Refills: 1 | Status: SHIPPED | OUTPATIENT
Start: 2024-05-17 | End: 2024-05-20

## 2024-05-17 RX ORDER — BETAMETHASONE DIPROPIONATE 0.05 %
OINTMENT (GRAM) TOPICAL 2 TIMES DAILY
OUTPATIENT
Start: 2024-05-17

## 2024-05-20 RX ORDER — MOMETASONE FUROATE 1 MG/G
OINTMENT TOPICAL
Qty: 45 G | Refills: 1 | Status: SHIPPED | OUTPATIENT
Start: 2024-05-20 | End: 2024-05-31 | Stop reason: ALTCHOICE

## 2024-05-30 ENCOUNTER — NURSE TRIAGE (OUTPATIENT)
Age: 79
End: 2024-05-30

## 2024-05-30 NOTE — TELEPHONE ENCOUNTER
Regarding: burning sensation/fire on her vaginal area.  ----- Message from Ellen ZAVALA sent at 5/30/2024  9:37 AM EDT -----  Pt called in stated that the cream that she applied , prescribed by the provider has a burning sensation/fire on her vaginal area. When she pees as well- burning.

## 2024-05-30 NOTE — TELEPHONE ENCOUNTER
Per rae dixon patient needs appointment for evaluation of vulvar symptoms  Patient  only goes to [place]

## 2024-05-30 NOTE — TELEPHONE ENCOUNTER
"Rebeca states after one week of use she experienced increased burning with application of Elocon cream. Last night woke up to void-Severe burning of labial with urinating. Noted blood with wiping.  Applied coconut oil which calmed down the discomfort.     Advised do not apply anymore Elocon until symptoms reviewed by provider and further recommendations are received.  Ok to continue with coconut oil as needed.    Trina, please review and advise    Reason for Disposition  • Caller has NON-URGENT medicine question about med that PCP or specialist prescribed and triager unable to answer question    Answer Assessment - Initial Assessment Questions  1. NAME of MEDICATION: \"What medicine are you calling about?\"      Elocon cream    3. PRESCRIBING HCP: \"Who prescribed it?\" Reason: if prescribed by specialist, call should be referred to that group.      Trina Ruelas  4. SYMPTOMS: \"Do you have any symptoms?\"      Burning when applies medication, burning with urination. Bleeding with wiping after one week of  use.   5. SEVERITY: If symptoms are present, ask \"Are they mild, moderate or severe?\"      Severe    Protocols used: Medication Question Call-ADULT-[place]    "

## 2024-05-31 ENCOUNTER — TELEPHONE (OUTPATIENT)
Dept: OBGYN CLINIC | Facility: MEDICAL CENTER | Age: 79
End: 2024-05-31

## 2024-05-31 ENCOUNTER — TELEPHONE (OUTPATIENT)
Age: 79
End: 2024-05-31

## 2024-05-31 ENCOUNTER — OFFICE VISIT (OUTPATIENT)
Dept: OBGYN CLINIC | Facility: MEDICAL CENTER | Age: 79
End: 2024-05-31
Payer: COMMERCIAL

## 2024-05-31 VITALS
SYSTOLIC BLOOD PRESSURE: 180 MMHG | HEIGHT: 66 IN | DIASTOLIC BLOOD PRESSURE: 90 MMHG | WEIGHT: 183.4 LBS | BODY MASS INDEX: 29.47 KG/M2

## 2024-05-31 DIAGNOSIS — N76.3 CHRONIC VULVITIS: Primary | ICD-10-CM

## 2024-05-31 PROCEDURE — 99213 OFFICE O/P EST LOW 20 MIN: CPT | Performed by: NURSE PRACTITIONER

## 2024-05-31 RX ORDER — BENAZEPRIL HYDROCHLORIDE 5 MG/1
5 TABLET ORAL DAILY
COMMUNITY
Start: 2024-05-29 | End: 2025-05-29

## 2024-05-31 NOTE — PATIENT INSTRUCTIONS
Bathe once daily. Gentle cleansing, rinse well and pat dry.  Use clotrimazole -betamethasone ointment (has at home) twice daily for next week.   Call to let me know how she is doing after that point. .   Call sooner with any worsening of symptoms.

## 2024-05-31 NOTE — TELEPHONE ENCOUNTER
Patient's  called in for clarification of the medication patient is to be using. He states the provider wrote down an ointment to apply daily, Nystatin Triamcinolone, and states they do not have this at home. Notes from office visit indicate clobetasol. Confirmed with  provider will be sending an order to pharmacy and confirmed with Mr. Mccartney the preferred pharmacy [facility] at [address] [facility]. Patient's  is asking for a call back from provider to confirm which medication is being sent and to be used by patient.

## 2024-05-31 NOTE — PROGRESS NOTES
Took patient's blood pressure around 15:05 and again around 15:47 first time it was 180/100 and the second time it was 180/90. Patient was advise to go to the ED, but refused. Patient stated she was at the ED recently. She was then advised at the least to call her PCP ASAP before the office closes for the weekend.

## 2024-05-31 NOTE — PROGRESS NOTES
"Assessment/Plan:  Bathe once daily. Gentle cleansing, rinse well and pat dry.  Use clotrimazole -betamethasone ointment (has at home) twice daily for next week.   Call to let me know how she is doing after that point. .   Call sooner with any worsening of symptoms.          1. Chronic vulvitis             Subjective:      Patient ID: Rebeca Mccartney is a 78 y.o. female.    HPI    Rebeca Mccartney is a 78 y.o.  female who is here today for a problem visit .   /90. Asymptomatic. Admittedly in severe pain.     Last in office on 24 for evaluation of her vulvar biopsy site.  5/3/24 for vulvar biopsy for left labia erosion.   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 See Note.     -- Increased p53 immunostain basilar/suprabasilar expression.    -- CK17 immunostain positive.    -- p16/Ki-67 and p16 immunostains negative/patchy with increased basilar/suprabasilar        proliferation.      -- Special stains for fungus (PAS) and bacteria (B&H) negative.     -- Special stain for spirochetes (Warthin-Starry) negative; Spirochete (Treponema pallidum)        immunostain negative.   - Negative for malignancy on multiple examined levels.     -- Pankeratin (CKAE1/3) immunostain confirms no invasive carcinoma. \"   She was instructed to use clobetosol to vulva twice daily and return for follow up in one month.   Here today with c/o severe vulvar pain. Using mometasone twice daily until 2 days ago. Now unable to tolerate any vulvar touch.  Claire states that he thought her vulva appeared \"better\" but when he applied her medication or even coconut oil, she practically screamed. It would take approx 5 minutes for the pain to resolve. Now it is fairly persistent.     Denies urinary or stool incontinence. Does not wear a pad. Often only wears her nightgown while home. She bathes " "with dove bar soap twice daily and avoids scrubbing.     She has strongly declined the use of vaginal estrogen.     The following portions of the patient's history were reviewed and updated as appropriate: allergies, current medications, past family history, past medical history, past social history, past surgical history, and problem list.    Review of Systems   Constitutional: Negative.    Gastrointestinal:  Negative for abdominal pain.   Genitourinary:  Positive for genital sores. Negative for decreased urine volume, difficulty urinating, dysuria, frequency, pelvic pain, urgency, vaginal bleeding, vaginal discharge and vaginal pain.        Denies urinary incontinence   Skin: Negative.    Neurological:  Negative for dizziness, light-headedness and headaches.   Hematological:  Negative for adenopathy.   Psychiatric/Behavioral: Negative.     All other systems reviewed and are negative.        Objective:      BP (!) 180/90 (BP Location: Left arm, Patient Position: Sitting, Cuff Size: Standard)   Ht 5' 6\" (1.676 m)   Wt 83.2 kg (183 lb 6.4 oz)   BMI 29.60 kg/m²          Physical Exam  Vitals and nursing note reviewed.   Constitutional:       Appearance: Normal appearance. She is well-developed.   Genitourinary:     Exam position: Lithotomy position.      Labia:         Right: Tenderness present. No rash or injury.         Left: Tenderness present. No rash or injury.       Urethra: No prolapse, urethral pain, urethral swelling or urethral lesion.      Rectum: No external hemorrhoid.      Comments: Generally, her vulva appear improved compared to previous visits.   Slight pink color on inner labia minora. Less of the erosive appearance of prior visits.   Lymphadenopathy:      Lower Body: No right inguinal adenopathy. No left inguinal adenopathy.   Skin:     General: Skin is warm and dry.   Neurological:      Mental Status: She is alert and oriented to person, place, and time.   Psychiatric:         Mood and Affect: " Mood normal.         Behavior: Behavior normal.

## 2024-05-31 NOTE — TELEPHONE ENCOUNTER
Spoke to patient. Her  manages her medications. Her provider asked me to clarify if she had any left over medication that was prescribed back in March and her spouse confirmed that they do have leftover clobetasol and they will use that as directed and call the office in about a week with an update on her symptoms per her provider.

## 2024-06-07 ENCOUNTER — NURSE TRIAGE (OUTPATIENT)
Age: 79
End: 2024-06-07

## 2024-06-07 NOTE — TELEPHONE ENCOUNTER
"Patient called stating that the majority of burning and irritation is all gone.  Patient reports having a spot right where the biopsy from 5/3/24 was taken. She denies any redness or discharge around the site.      Advised to use coconut oil just in case urine would get in the spot where biopsy was taken.      Will forward to Trina for any further recommendations.      Additional Information   Negative: Symptoms of a yeast infection (i.e., itchy, white discharge, not bad smelling) and feels like prior vaginal yeast infections   Negative: Mild rash (e.g., redness, tiny bumps, sore) of genital area and present < 24 hours   Negative: Mild vaginal itching   Negative: Vaginal dryness during intercourse   Negative: Vaginal pessary, questions about    Answer Assessment - Initial Assessment Questions  1. SYMPTOM: \"What's the main symptom you're concerned about?\" (e.g., pain, itching, dryness)      One spot that is still irritating and burning   2. LOCATION: \"Where is the  spot  located?\" (e.g., inside/outside, left/right)      Where provider took the biopsy   3. ONSET: \"When did the  spot  start?\"      When she got it removed.   4. PAIN: \"Is there any pain?\" If Yes, ask: \"How bad is it?\" (Scale: 1-10; mild, moderate, severe)      Denies   5. ITCHING: \"Is there any itching?\" If Yes, ask: \"How bad is it?\" (Scale: 1-10; mild, moderate, severe)      Denies  6. CAUSE: \"What do you think is causing the discharge?\" \"Have you had the same problem before? What happened then?\"      Biopsy of the area   7. OTHER SYMPTOMS: \"Do you have any other symptoms?\" (e.g., fever, itching, vaginal bleeding, pain with urination, injury to genital area, vaginal foreign body)      Denies   8. PREGNANCY: \"Is there any chance you are pregnant?\" \"When was your last menstrual period?\"      N/a    Protocols used: Vaginal Symptoms-ADULT-[place]    "

## 2024-06-07 NOTE — TELEPHONE ENCOUNTER
Regarding: vaginal burning with medication  ----- Message from Linda PAREDES sent at 6/7/2024  3:00 PM EDT -----  Patient calling with she finished the 7 day of the medication for vaginal burning ,patient states she no longer has pain but still feels that it is irritated, patient can tell its at that spot.      Tried CTS

## 2024-06-10 NOTE — TELEPHONE ENCOUNTER
RN placed a call to patient with provider's recommendations. Pt verbalized an understanding. No further questions.

## 2024-06-10 NOTE — TELEPHONE ENCOUNTER
Agree with recommendations and I'm THRILLED she is improving. Call with any worsening of symptoms.

## 2024-06-18 NOTE — PROGRESS NOTES
"Assessment/Plan:  Use clotrimazole -betamethasone ointment twice daily until symptoms resolve. (Pea sized amount massaged into affected area).   If symptoms haven't resolved within 4 weeks, return to office.   Once they resolve, use nightly x 7 days. If symptoms continue to improve, use medication 3 times per week (Monday, Wednesday and Friday).  Call office with any worsening of symptoms.   Encouraged Rebeca to apply her own medications to give Juan David a break.   Self care encouraged.   Continue to bathe daily with dove bar soap, rinse well and pat dry.   Keep blood sugars in normal range and follow up with PCP for abnormal results.        1. Chronic vulvitis             Subjective:      Patient ID: Rebeca Mccartney is a 78 y.o. female.    HPI    Rebeca Mccartney is a 78 y.o.  female who is here today for a follow up visit accompanied by  Juan David.   Last in office on 24 for follow up/c/o significant vulvar pain.   5/3/24 for vulvar biopsy for left labia erosion.   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 See Note.     -- Increased p53 immunostain basilar/suprabasilar expression.    -- CK17 immunostain positive.    -- p16/Ki-67 and p16 immunostains negative/patchy with increased basilar/suprabasilar        proliferation.      -- Special stains for fungus (PAS) and bacteria (B&H) negative.     -- Special stain for spirochetes (Warthin-Starry) negative; Spirochete (Treponema pallidum)        immunostain negative.   - Negative for malignancy on multiple examined levels.     -- Pankeratin (CKAE1/3) immunostain confirms no invasive carcinoma. \"   She had been using mometasone twice daily until 2 days before her exam. Her vulva had improved overall. Less erosive appearance compared to prior visit. Juan David also felt it had improved but her pain worsened.   She was given the " "following instructions: Bathe once daily. Gentle cleansing, rinse well and pat dry.  Use clotrimazole -betamethasone ointment (has at home) twice daily for next week. She followed this for one week and felt better then.     Here today with c/o point specific pain on her right vulva. Rates pain 1-2/10. Pain at worst 5-6/10. Using coconut oil twice weekly. She feels the coconut oil \"cools\" the area. She rinses her vulva after voiding. Feels \"something there\" with voiding.   No pain when lying around.   Pain noted with movement, voiding or touch.   Juan David verbalized frustration that Mya \"won't do anything to help herself\" as he applies all her lotions and creams.    Glucose this am was 112; last night was 108.   Yesterday morning  was 182.  Denies vaginal discharge , odor or pelvic pain.       The following portions of the patient's history were reviewed and updated as appropriate: allergies, current medications, past family history, past medical history, past social history, past surgical history, and problem list.    Review of Systems   Constitutional: Negative.    Gastrointestinal:  Negative for abdominal pain, constipation, diarrhea, nausea and vomiting.   Genitourinary:  Positive for genital sores. Negative for decreased urine volume, difficulty urinating, dysuria, frequency, pelvic pain, urgency, vaginal bleeding, vaginal discharge and vaginal pain.   Skin: Negative.    Hematological:  Negative for adenopathy.   Psychiatric/Behavioral: Negative.     All other systems reviewed and are negative.        Objective:      /92 (BP Location: Left arm, Patient Position: Sitting, Cuff Size: Standard)   Wt 83 kg (183 lb)   BMI 29.54 kg/m²          Physical Exam  Vitals and nursing note reviewed.   Constitutional:       Appearance: Normal appearance. She is well-developed.   Genitourinary:     Exam position: Lithotomy position.      Labia:         Right: Tenderness present. No rash, lesion or injury.         Left: No " rash, tenderness, lesion or injury.       Urethra: No prolapse, urethral pain, urethral swelling or urethral lesion.          Comments: Slight erosive area, pink in color in areas marked on diagram.   Lymphadenopathy:      Lower Body: No right inguinal adenopathy. No left inguinal adenopathy.   Skin:     General: Skin is warm and dry.   Neurological:      Mental Status: She is alert and oriented to person, place, and time.   Psychiatric:         Mood and Affect: Mood normal.         Behavior: Behavior normal.

## 2024-06-20 ENCOUNTER — OFFICE VISIT (OUTPATIENT)
Dept: OBGYN CLINIC | Facility: MEDICAL CENTER | Age: 79
End: 2024-06-20
Payer: COMMERCIAL

## 2024-06-20 VITALS — WEIGHT: 183 LBS | DIASTOLIC BLOOD PRESSURE: 92 MMHG | BODY MASS INDEX: 29.54 KG/M2 | SYSTOLIC BLOOD PRESSURE: 150 MMHG

## 2024-06-20 DIAGNOSIS — N76.3 CHRONIC VULVITIS: Primary | ICD-10-CM

## 2024-06-20 PROCEDURE — 99213 OFFICE O/P EST LOW 20 MIN: CPT | Performed by: NURSE PRACTITIONER

## 2024-06-20 RX ORDER — LATANOPROST 50 UG/ML
SOLUTION/ DROPS OPHTHALMIC
COMMUNITY
Start: 2024-06-19

## 2024-06-20 NOTE — PATIENT INSTRUCTIONS
Use clotrimazole -betamethasone ointment twice daily until symptoms resolve. (Pea sized amount massaged into affected area).   If symptoms haven't resolved within 4 weeks, return to office.   Once they resolve, use nightly x 7 days. If symptoms continue to improve, use medication 3 times per week (Monday, Wednesday and Friday).  Call office with any worsening of symptoms.   Encouraged Rebeca to apply her own medications to give Juan David a break.   Self care encouraged.   Continue to bathe daily with dove bar soap, rinse well and pat dry.   Keep blood sugars in normal range and follow up with PCP for abnormal results.

## 2024-07-01 ENCOUNTER — APPOINTMENT (OUTPATIENT)
Dept: LAB | Facility: MEDICAL CENTER | Age: 79
End: 2024-07-01
Payer: COMMERCIAL

## 2024-07-23 ENCOUNTER — TELEPHONE (OUTPATIENT)
Age: 79
End: 2024-07-23

## 2024-07-23 NOTE — TELEPHONE ENCOUNTER
Pt calling to schedule a follow up appt with Trina Story  she said she is still having symptoms  and can wait until October

## 2024-07-24 ENCOUNTER — OFFICE VISIT (OUTPATIENT)
Dept: OBGYN CLINIC | Facility: MEDICAL CENTER | Age: 79
End: 2024-07-24
Payer: COMMERCIAL

## 2024-07-24 VITALS — SYSTOLIC BLOOD PRESSURE: 144 MMHG | BODY MASS INDEX: 29.54 KG/M2 | WEIGHT: 183 LBS | DIASTOLIC BLOOD PRESSURE: 86 MMHG

## 2024-07-24 DIAGNOSIS — N76.3 CHRONIC VULVITIS: Primary | ICD-10-CM

## 2024-07-24 PROCEDURE — 99213 OFFICE O/P EST LOW 20 MIN: CPT | Performed by: NURSE PRACTITIONER

## 2024-07-24 NOTE — PATIENT INSTRUCTIONS
Referred to her own dermatologist for additional opinion on vulvar erosion  She is aware vaginal estrogen is the treatment for VVA but declined.   Use pea sized amount of clotrimazole-betamethosone ointment twice daily to affected area.  Bathe daily with dove bar soap. Rinse well and pat dry.  Continue to protect skin with coconut oil until evaluated.   Ask dermatology to forward consult notes to me.

## 2024-07-24 NOTE — PROGRESS NOTES
"Assessment/Plan:  Referred to her own dermatologist for additional opinion on vulvar erosion  She is aware vaginal estrogen is the treatment for VVA but declined.   Use pea sized amount of clotrimazole-betamethosone ointment twice daily to affected area.  Bathe daily with dove bar soap. Rinse well and pat dry.  Continue to protect skin with coconut oil until evaluated.   Ask dermatology to forward consult notes to me.   She and Juan David verbalized understanding of the plan.          1. Chronic vulvitis             Subjective:      Patient ID: Rebeca Mccartney is a 78 y.o. female.    HPI    Rebeca Mccartney is a 78 y.o.  female who is here today for a problem visit  accompanied by  Juan David. She feels her vulvar burning and irritation is worsening as of recent. No improvement with current treatment. Juan David states Rebeca will not apply her own vulvar medication but he feels her vulva looks improved. She does still \"jump and seems uncomfortable\" with any application to her vulva.   They have been using the clotrimazole-betamethosone ointment once daily (previously twice daily) without improvement. They were going to decreased its use to 3 times a week once normalized but states that never occurred.   She is bathing daily with dove bar soap. Gentle washing. Rinses well and pats dry. She rinses after voiding and applies coconut oil to area. The coconut oil application also causes discomfort.     Last in office on 24 with chronic vulvitis.   5/3/24 for vulvar biopsy for left labia erosion.   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 See Note.     -- Increased p53 immunostain basilar/suprabasilar expression.    -- CK17 immunostain positive.    -- p16/Ki-67 and p16 immunostains negative/patchy with increased basilar/suprabasilar        proliferation.      -- Special stains for " "fungus (PAS) and bacteria (B&H) negative.     -- Special stain for spirochetes (Warthin-Starry) negative; Spirochete (Treponema pallidum)        immunostain negative.   - Negative for malignancy on multiple examined levels.     -- Pankeratin (CKAE1/3) immunostain confirms no invasive carcinoma. \"         The following portions of the patient's history were reviewed and updated as appropriate: allergies, current medications, past family history, past medical history, past social history, past surgical history, and problem list.    Review of Systems   Constitutional: Negative.    Gastrointestinal:  Negative for abdominal pain.   Genitourinary:  Positive for genital sores. Negative for decreased urine volume, difficulty urinating, dysuria, frequency, pelvic pain, urgency, vaginal bleeding, vaginal discharge and vaginal pain.   Musculoskeletal:  Negative for arthralgias and myalgias.   Skin: Negative.    Hematological:  Negative for adenopathy.   Psychiatric/Behavioral: Negative.     All other systems reviewed and are negative.        Objective:      /86 (BP Location: Left arm, Patient Position: Sitting, Cuff Size: Standard)   Wt 83 kg (183 lb)   BMI 29.54 kg/m²          Physical Exam  Vitals and nursing note reviewed.   Constitutional:       Appearance: Normal appearance. She is well-developed.   Genitourinary:     Exam position: Lithotomy position.      Labia:         Right: Tenderness and lesion present. No rash or injury.         Left: Tenderness and lesion present. No rash or injury.       Urethra: No prolapse, urethral pain, urethral swelling or urethral lesion.      Vagina: No signs of injury and foreign body. No vaginal discharge, erythema, tenderness, bleeding, lesions or prolapsed vaginal walls.      Rectum: No external hemorrhoid.          Comments: Pink colored erosive areas noted on vulvar as marked on diagram. Otherwise, improved to normal findings on vulva.  Vulvovaginal atrophy  Lymphadenopathy: "      Lower Body: No right inguinal adenopathy. No left inguinal adenopathy.   Skin:     General: Skin is warm and dry.   Neurological:      Mental Status: She is alert and oriented to person, place, and time.   Psychiatric:         Mood and Affect: Mood normal.         Behavior: Behavior normal.

## 2024-07-29 ENCOUNTER — LAB REQUISITION (OUTPATIENT)
Dept: LAB | Facility: HOSPITAL | Age: 79
End: 2024-07-29
Payer: COMMERCIAL

## 2024-07-29 DIAGNOSIS — B37.9 CANDIDIASIS, UNSPECIFIED: ICD-10-CM

## 2024-07-29 PROCEDURE — 87102 FUNGUS ISOLATION CULTURE: CPT | Performed by: PHYSICIAN ASSISTANT

## 2024-07-30 ENCOUNTER — TELEPHONE (OUTPATIENT)
Age: 79
End: 2024-07-30

## 2024-07-30 NOTE — TELEPHONE ENCOUNTER
Dermatologist called in regards to patient, was told to follow up and speak to Trina Cutler, attempted to warm transfer to [place] with no answer. Stated she is free until 8:30 if she could call her back by then, [phone].

## 2024-07-31 NOTE — TELEPHONE ENCOUNTER
Spoke with derm today. Rebeca will be treated for Lichen Planus with clobetasol. She was made aware this was orginally ordered but not covered by insurance. They will use [facility].

## 2024-08-05 LAB — FUNGUS SPEC CULT: NORMAL

## 2024-08-12 LAB — FUNGUS SPEC CULT: NORMAL

## 2024-08-19 LAB — FUNGUS SPEC CULT: NORMAL

## 2024-08-26 LAB — FUNGUS SPEC CULT: NORMAL

## 2024-09-03 LAB — FUNGUS SPEC CULT: NORMAL

## 2024-09-04 ENCOUNTER — DOCUMENTATION (OUTPATIENT)
Dept: HEMATOLOGY ONCOLOGY | Facility: CLINIC | Age: 79
End: 2024-09-04

## 2024-09-04 NOTE — PROGRESS NOTES
Chart rvw'd on 9/4/2024      Referred by:  Jaci Camara PA-C --> Dr. Abe Norman    Diagnosis:  Lichen planus of vulva     Imaging:  N/A      Pathology:  5/3/2024-  A. Vulva, Vulva Biopsy:  - Lichenoid and spongiotic dermatitis with basilar squamous atypia; associated mixed    lymphoplasmacytic inflammation and few eosinophils with vascular congestion. See Note.     -- Increased p53 immunostain basilar/suprabasilar expression.    -- CK17 immunostain positive.    -- p16/Ki-67 and p16 immunostains negative/patchy with increased basilar/suprabasilar        proliferation.      -- Special stains for fungus (PAS) and bacteria (B&H) negative.     -- Special stain for spirochetes (Warthin-Starry) negative; Spirochete (Treponema pallidum)        immunostain negative.   - Negative for malignancy on multiple examined levels.     -- Pankeratin (CKAE1/3) immunostain confirms no invasive carcinoma.       Surgery:  N/A      Post surgical pathology:  N/A

## 2024-09-11 ENCOUNTER — CONSULT (OUTPATIENT)
Dept: GYNECOLOGIC ONCOLOGY | Facility: CLINIC | Age: 79
End: 2024-09-11
Payer: COMMERCIAL

## 2024-09-11 VITALS
WEIGHT: 180 LBS | OXYGEN SATURATION: 98 % | RESPIRATION RATE: 18 BRPM | DIASTOLIC BLOOD PRESSURE: 90 MMHG | TEMPERATURE: 97.8 F | HEIGHT: 66 IN | BODY MASS INDEX: 28.93 KG/M2 | HEART RATE: 133 BPM | SYSTOLIC BLOOD PRESSURE: 160 MMHG

## 2024-09-11 DIAGNOSIS — N76.3 CHRONIC VULVITIS: Primary | ICD-10-CM

## 2024-09-11 PROCEDURE — 99204 OFFICE O/P NEW MOD 45 MIN: CPT | Performed by: OBSTETRICS & GYNECOLOGY

## 2024-09-11 PROCEDURE — 56605 BIOPSY OF VULVA/PERINEUM: CPT | Performed by: OBSTETRICS & GYNECOLOGY

## 2024-09-11 PROCEDURE — 88305 TISSUE EXAM BY PATHOLOGIST: CPT | Performed by: PATHOLOGY

## 2024-09-11 PROCEDURE — 88342 IMHCHEM/IMCYTCHM 1ST ANTB: CPT | Performed by: PATHOLOGY

## 2024-09-11 PROCEDURE — 88312 SPECIAL STAINS GROUP 1: CPT | Performed by: PATHOLOGY

## 2024-09-11 RX ORDER — LIDOCAINE 40 MG/G
CREAM TOPICAL
Qty: 28 G | Refills: 1 | Status: SHIPPED | OUTPATIENT
Start: 2024-09-11

## 2024-09-11 NOTE — ASSESSMENT & PLAN NOTE
Patient has a history of chronic vulvitis dating back to February 2024 at which time she was in nursing facility after a hip fracture and unable to care for herself or clean up after bowel or bladder function.  The patient subsequently developed a painful and itchy vulvar rash.  This has been treated off and on over the course of the past several months with various steroid and antifungal creams.  There is been no significant success.  The patient describes this as originally being erosive and at this point it is not.    It is hard to have a working diagnosis of this.  The prior biopsy revealed some evidence of lichenoid disease but was not particularly specific.  There is also some concern in the note that there may be some TERENCE.  As such a repeat biopsy was performed today.    This could be partially treated lichen planus.  It could also be a hypersensitivity reaction although you do expect the steroid to have taken care of this.  At this point we recommended no treatment.  Topical lidocaine will be given for symptom improvement.  We have recommended using Dove soap once per day and covering with petrolatum based gel twice per day.  We will see the patient back in 1 to 2 weeks for biopsy review.

## 2024-09-11 NOTE — PROGRESS NOTES
Assessment/Plan:    Problem List Items Addressed This Visit       Chronic vulvitis - Primary     Patient has a history of chronic vulvitis dating back to February 2024 at which time she was in nursing facility after a hip fracture and unable to care for herself or clean up after bowel or bladder function.  The patient subsequently developed a painful and itchy vulvar rash.  This has been treated off and on over the course of the past several months with various steroid and antifungal creams.  There is been no significant success.  The patient describes this as originally being erosive and at this point it is not.    It is hard to have a working diagnosis of this.  The prior biopsy revealed some evidence of lichenoid disease but was not particularly specific.  There is also some concern in the note that there may be some TERENCE.  As such a repeat biopsy was performed today.    This could be partially treated lichen planus.  It could also be a hypersensitivity reaction although you do expect the steroid to have taken care of this.  At this point we recommended no treatment.  Topical lidocaine will be given for symptom improvement.  We have recommended using Dove soap once per day and covering with petrolatum based gel twice per day.  We will see the patient back in 1 to 2 weeks for biopsy review.         Relevant Medications    lidocaine (LMX) 4 % cream    Other Relevant Orders    Tissue Exam           CHIEF COMPLAINT: Chronic vulvitis      Subjective:     Problem:  Cancer Staging   Personal history of malignant melanoma  Staging form: Melanoma of the Skin, AJCC 8th Edition  - Pathologic stage from 4/20/2021: Stage IA (pT1a, cN0, cM0) - Signed by SVETLANA Lynn on 4/30/2024      Previous therapy:  Oncology History   Personal history of malignant melanoma   3/18/2021 Initial Diagnosis    Melanoma (HCC)     3/18/2021 Biopsy    Left upper back shave biopsy:  Malignant melanoma   - at least 0.4 mm thickness   - 0  mitosis   - negative ulceration     4/20/2021 Surgery    In office wide excision  A. Skin, left upper back, excision:  - Residual melanoma in-situ; prior biopsy site reaction.  - Atypical lentiginous compound nevus.  - Inked margins with no tumor seen     4/20/2021 -  Cancer Staged    Staging form: Melanoma of the Skin, AJCC 8th Edition  - Pathologic stage from 4/20/2021: Stage IA (pT1a, cN0, cM0) - Signed by SVETLANA Lynn on 4/30/2024  Stage prefix: Initial diagnosis             Patient ID: Rebeca Mccartney is a 78 y.o. adult  Patient presents for evaluation and management of chronic vulvitis.    Patient was in her usual state of health until February 2024 when she developed chronic vulvitis after residing in a rehab facility with what the patient describes as slow nursing response to change diapers.  The patient had had hip surgery in December 2023.  Since that time the patient has had chronic itching and burning..  She was seen by general GYN in May 2024 and a biopsy was performed    Patient underwent vulvar biopsy in May 2024.  This revealed the following:  Final Diagnosis A. Vulva, Vulva Biopsy: - Lichenoid and spongiotic dermatitis with basilar squamous atypia; associated mixed lymphoplasmacytic inflammation and few eosinophils with vascular congestion. See Note. -- Increased p53 immunostain basilar/suprabasilar expression. -- CK17 immunostain positive. -- p16/Ki-67 and p16 immunostains negative/patchy with increased basilar/suprabasilar proliferation. -- Special stains for fungus (PAS) and bacteria (B&H) negative. -- Special stain for spirochetes (Warthin-Starry) negative; Spirochete (Treponema pallidum) immunostain negative. - Negative for malignancy on multiple examined levels. -- Pankeratin (CKAE1/3) immunostain confirms no invasive carcinoma. Electronically signed by Dayana Jo MD on 5/13/2024at 1526.     Note The histologic findings favor an inflammatory process including allergic contact  dermatitis and histologic simulator nummular [id number] Rebeca Mccartney [id number] as well as some drug eruptions, resolving fungal dermatitis and/or an early presentation of lichen sclerosus. Although the histologyfallsshort of a diagnosis of differentiated vulvar intraepithelial neoplasia (dVIN) and p53 inflammatory up-regulation is favored, giventheconcurrent presence of basilar squamous atypia and CK17 positivity, a precursor lesion of dVIN cannot be completely excluded. Negative/patchy p16 immunostain excludes a diagnosis of high-grade squamous intraepithelial lesion/HSIL. Appropriate clinical follow-up is recommended. Intradepartmental consultation (DD) agrees with     The patient was then treated with multiple antifungal as well as steroid creams without improvement.  She was seen by dermatology and recommended clobetasol in early August 2024.  Her working diagnosis at that point was lichen planus based on a history of this in other areas of the body as well as visual confirmation.  Patient denies any other dermatitis anywhere in her body.          Review of Systems   Constitutional: Negative.    HENT: Negative.     Eyes: Negative.    Respiratory: Negative.     Cardiovascular: Negative.    Gastrointestinal: Negative.    Endocrine: Negative.    Genitourinary: Negative.         Vulvar discomfort   Musculoskeletal: Negative.    Skin: Negative.    Neurological: Negative.    Hematological: Negative.    Psychiatric/Behavioral: Negative.         Current Outpatient Medications   Medication Sig Dispense Refill    acetaminophen (TYLENOL) 325 mg tablet Take 650 mg by mouth every 6 (six) hours as needed for mild pain      ALPRAZolam (XANAX) 0.25 mg tablet Take 0.25 mg by mouth as needed      benazepril (LOTENSIN) 5 mg tablet Take 5 mg by mouth daily      diphenhydrAMINE (BENADRYL) 25 mg tablet Take 25 mg by mouth 2 (two) times a day      Eliquis 5 MG Take 5 mg by mouth 2 (two) times a day      glimepiride (AMARYL)  4 mg tablet Take 4 mg by mouth 2 (two) times a day      Levemir FlexTouch 100 units/mL injection pen       lidocaine (LMX) 4 % cream Apply to affected area 3 times per day as needed for itching and burning 28 g 1    lovastatin (MEVACOR) 40 MG tablet       metFORMIN (GLUCOPHAGE-XR) 500 mg 24 hr tablet Take 1 tablet by mouth 2 (two) times a day with meals      OneTouch Verio test strip       ciclopirox (LOPROX) 0.77 % cream Apply topically (Patient not taking: Reported on 9/11/2024)      latanoprost (XALATAN) 0.005 % ophthalmic solution  (Patient not taking: Reported on 9/11/2024)       No current facility-administered medications for this visit.       Allergies   Allergen Reactions    Shellfish Allergy - Food Allergy Anaphylaxis     rash   rash    Bacitracin Other (See Comments)    Neomycin Other (See Comments)    Polymyxin B Other (See Comments)    Amlodipine GI Intolerance    Atenolol GI Intolerance    Clonidine Other (See Comments)     Patient reports she did not tolerate, unspecified side effects      Conjugated Estrogens Hypertension    Dog Epithelium Itching    Felodipine GI Intolerance and Dizziness     Per pt    Furosemide GI Intolerance    Grass Pollen(K-O-R-T-Swt Richard) Sneezing    Hydrochlorothiazide Other (See Comments)    Hydrochlorothiazide W-Triamterene GI Intolerance    Labetalol GI Intolerance and Dizziness     Per pt      Losartan      Headache, nausea    Meperidine     Metoprolol GI Intolerance    Morphine And Codeine      hallucinations  hallucinations    Neosporin [Neomycin-Bacitracin Zn-Polymyx]      rash  rash    Other Sneezing     hallucinations    Permethrin Other (See Comments)    Pollen Extract Sneezing    Spironolactone Other (See Comments)     optical migraine    Sudafed [Pseudoephedrine]     Triamterene Other (See Comments)    Valsartan Other (See Comments)    Colloidal Oatmeal Rash       Past Medical History:   Diagnosis Date    Cancer (HCC)     Diabetes mellitus (HCC)     HTN  "(hypertension)     Hypertension     Melanoma of face (HCC)        Past Surgical History:   Procedure Laterality Date    HAND SURGERY Right 2021    HIP FRACTURE SURGERY Left     HYSTERECTOMY      IR AORTAGRAM WITH RUN-OFF  11/15/2018    MASTECTOMY      age 33    REPLACEMENT TOTAL KNEE Left     US GUIDED THYROID BIOPSY  2022       OB History          1    Para   1    Term   1            AB        Living   1         SAB        IAB        Ectopic        Multiple        Live Births   1                 Family History   Problem Relation Age of Onset    Diabetes Mother     Hypertension Mother     Atrial fibrillation Father     Hypertension Sister     Stroke Son     Breast cancer Neg Hx        The following portions of the patient's history were reviewed and updated as appropriate: allergies, current medications, past family history, past medical history, past social history, past surgical history, and problem list.      Objective:    Blood pressure 160/90, pulse (!) 133, temperature 97.8 °F (36.6 °C), temperature source Tympanic, resp. rate 18, height 5' 6\" (1.676 m), weight 81.6 kg (180 lb), SpO2 98%.  Body mass index is 29.05 kg/m².    Physical Exam  Constitutional:       Appearance: She is well-developed.   HENT:      Head: Normocephalic and atraumatic.   Neck:      Thyroid: No thyromegaly.   Cardiovascular:      Rate and Rhythm: Normal rate and regular rhythm.      Heart sounds: Normal heart sounds.   Pulmonary:      Effort: Pulmonary effort is normal.      Breath sounds: Normal breath sounds.   Abdominal:      General: Bowel sounds are normal.      Palpations: Abdomen is soft.      Comments: Well healed laparoscopic incisions.   Genitourinary:     Comments: -External female genitalia with erythematous but nonerosive diffuse changes with mild edema in the bilateral labia minora left greater than right.  This also involves the clitoral ortiz.  There is no evidence of bacterial or yeast " "infection.  There are no open sores.  There is no evidence of preinvasive or invasive malignancy., normal Bartholin's and [place]ained[place]'s glands                  -Normal midline urethral meatus. No lesions notes                  -Bladder without fullness mass or tenderness                  -Vagina without lesion or discharge No significant cystocele or rectocele noted                  -Cervix surgically absent                  -Uterus surgically absent                  -Adnexae surgically absent                  - Anus without fissure of lesion    Musculoskeletal:         General: Normal range of motion.      Cervical back: Normal range of motion and neck supple.   Lymphadenopathy:      Cervical: No cervical adenopathy.   Skin:     General: Skin is warm and dry.   Neurological:      Mental Status: She is alert and oriented to person, place, and time.   Psychiatric:         Behavior: Behavior normal.           No results found for: \"\"  Lab Results   Component Value Date    WBC 8.99 09/28/2023    HGB 14.8 09/28/2023    HCT 46.3 (H) 09/28/2023    MCV 98 09/28/2023     09/28/2023     Lab Results   Component Value Date    K 4.6 08/23/2024     08/23/2024    CO2 30 08/23/2024    BUN 17 08/23/2024    CREATININE 1.11 (H) 08/23/2024    GLUF 113 (H) 07/01/2024    CALCIUM 9.5 08/23/2024    AST 15 05/20/2024    ALT 10 05/20/2024    ALKPHOS 81 05/20/2024    EGFR 50 (L) 08/23/2024       Lesion Biopsy    Date/Time: 9/11/2024 11:15 AM    Performed by: Mike Hidalgo MD  Authorized by: Mike Hidalgo MD  Universal Protocol:  Consent: Verbal consent obtained.  Patient understanding: patient states understanding of the procedure being performed  Patient identity confirmed: verbally with patient    Procedure Details - Lesion Biopsy:     Body area:  Anogenital    Anogenital location:  Vulva    Biopsy method: punch biopsy      Biopsy tissue type: skin    Initial size (mm):  30    Final defect size (mm):  30    Malignancy: " benign lesion       After verbal informed consent the area was cleansed with Betadine and then topical 4% lidocaine was placed without difficulty.  The area was then infiltrated with 1% lidocaine and a 4 mm punch biopsy was performed of the left mid labia minora.  This was at the level of the introitus.  The area was treated with silver nitrate for control of bleeding.  No complications were noted.

## 2024-09-17 PROCEDURE — 88312 SPECIAL STAINS GROUP 1: CPT | Performed by: PATHOLOGY

## 2024-09-17 PROCEDURE — 88305 TISSUE EXAM BY PATHOLOGIST: CPT | Performed by: PATHOLOGY

## 2024-09-17 PROCEDURE — 88342 IMHCHEM/IMCYTCHM 1ST ANTB: CPT | Performed by: PATHOLOGY

## 2024-09-25 ENCOUNTER — OFFICE VISIT (OUTPATIENT)
Dept: GYNECOLOGIC ONCOLOGY | Facility: CLINIC | Age: 79
End: 2024-09-25
Payer: COMMERCIAL

## 2024-09-25 VITALS
SYSTOLIC BLOOD PRESSURE: 140 MMHG | HEIGHT: 66 IN | TEMPERATURE: 97.7 F | WEIGHT: 180 LBS | OXYGEN SATURATION: 99 % | BODY MASS INDEX: 28.93 KG/M2 | DIASTOLIC BLOOD PRESSURE: 84 MMHG | RESPIRATION RATE: 16 BRPM | HEART RATE: 109 BPM

## 2024-09-25 DIAGNOSIS — N76.3 CHRONIC VULVITIS: Primary | ICD-10-CM

## 2024-09-25 PROCEDURE — 99213 OFFICE O/P EST LOW 20 MIN: CPT | Performed by: OBSTETRICS & GYNECOLOGY

## 2024-09-25 NOTE — ASSESSMENT & PLAN NOTE
Patient appears to have a case of chronic vulvitis secondary to vulvar skin likely burn from acidic urine.  Biopsy last week reveals no evidence of dermatitis or TERENCE.  The vulvar area shows less erythema and edema.  The patient states that she is leaning toward getting better.    At this time we recommended no further lidocaine.  The patient can use topical petrolatum as needed.  Will see her back in 4 weeks for reassessment.

## 2024-09-25 NOTE — PROGRESS NOTES
Assessment/Plan:    Problem List Items Addressed This Visit       Chronic vulvitis - Primary     Patient appears to have a case of chronic vulvitis secondary to vulvar skin likely burn from acidic urine.  Biopsy last week reveals no evidence of dermatitis or TERENCE.  The vulvar area shows less erythema and edema.  The patient states that she is leaning toward getting better.    At this time we recommended no further lidocaine.  The patient can use topical petrolatum as needed.  Will see her back in 4 weeks for reassessment.              CHIEF COMPLAINT: Chronic vulvitis      Problem:  Cancer Staging   Personal history of malignant melanoma  Staging form: Melanoma of the Skin, AJCC 8th Edition  - Pathologic stage from 4/20/2021: Stage IA (pT1a, cN0, cM0) - Signed by SVETLANA Lynn on 4/30/2024        Previous therapy:  Oncology History   Personal history of malignant melanoma   3/18/2021 Initial Diagnosis    Melanoma (HCC)     3/18/2021 Biopsy    Left upper back shave biopsy:  Malignant melanoma   - at least 0.4 mm thickness   - 0 mitosis   - negative ulceration     4/20/2021 Surgery    In office wide excision  A. Skin, left upper back, excision:  - Residual melanoma in-situ; prior biopsy site reaction.  - Atypical lentiginous compound nevus.  - Inked margins with no tumor seen     4/20/2021 -  Cancer Staged    Staging form: Melanoma of the Skin, AJCC 8th Edition  - Pathologic stage from 4/20/2021: Stage IA (pT1a, cN0, cM0) - Signed by SVETLANA Lynn on 4/30/2024  Stage prefix: Initial diagnosis             Patient ID: Rebeca Mccartney is a 78 y.o. adult  Patient is a very pleasant 78-year-old female with a history of chronic vulvitis after long-term stay in a nursing facility with hip replacement and being stuck in a wet diaper for long periods of time.  Patient has undergone evaluation by dermatology and has had multiple antifungal and steroid creams.  Biopsy could not rule out TERENCE.  Patient presented for  evaluation 2 weeks ago and the vulva was most consistent with chronic vulvitis.  A biopsy was however performed to rule out TERENCE.  This revealed the following:  Final Diagnosis  A. Vulva, left vulva biopsy:  - Benign skin with atrophic changes, mild congestion and minimal, chronic non specific inflammation in superficial dermis.  - No fungal organisms noted with PAS stain.  - Negative for intraepithelial neoplasia or carcinoma. P53 is wild type.     Patient continues to note vulvar burning with petrolatum and lidocaine.  When she does not use any treatment she feels better.  The patient's  who applies the Vaseline has noted that the vulvar area looks better.            The following portions of the patient's history were reviewed and updated as appropriate: allergies, current medications, past family history, past medical history, past social history, past surgical history, and problem list.    Review of Systems   Constitutional: Negative.    HENT: Negative.     Eyes: Negative.    Respiratory: Negative.     Cardiovascular: Negative.    Gastrointestinal: Negative.    Endocrine: Negative.    Genitourinary:  Positive for genital sores.   Musculoskeletal: Negative.    Skin: Negative.    Neurological: Negative.    Hematological: Negative.    Psychiatric/Behavioral: Negative.         Current Outpatient Medications   Medication Sig Dispense Refill    acetaminophen (TYLENOL) 325 mg tablet Take 650 mg by mouth every 6 (six) hours as needed for mild pain      ALPRAZolam (XANAX) 0.25 mg tablet Take 0.25 mg by mouth as needed      benazepril (LOTENSIN) 5 mg tablet Take 5 mg by mouth daily      diphenhydrAMINE (BENADRYL) 25 mg tablet Take 25 mg by mouth 2 (two) times a day      Eliquis 5 MG Take 5 mg by mouth 2 (two) times a day      glimepiride (AMARYL) 4 mg tablet Take 4 mg by mouth 2 (two) times a day      Levemir FlexTouch 100 units/mL injection pen       lidocaine (LMX) 4 % cream Apply to affected area 3 times per day  "as needed for itching and burning 28 g 1    lovastatin (MEVACOR) 40 MG tablet       metFORMIN (GLUCOPHAGE-XR) 500 mg 24 hr tablet Take 1 tablet by mouth 2 (two) times a day with meals      OneTouch Verio test strip       ciclopirox (LOPROX) 0.77 % cream Apply topically (Patient not taking: Reported on 9/11/2024)      latanoprost (XALATAN) 0.005 % ophthalmic solution  (Patient not taking: Reported on 9/11/2024)       No current facility-administered medications for this visit.           Objective:    Blood pressure 140/84, pulse (!) 109, temperature 97.7 °F (36.5 °C), temperature source Tympanic, resp. rate 16, height 5' 6\" (1.676 m), weight 81.6 kg (180 lb), SpO2 99%.  Body mass index is 29.05 kg/m².  Body surface area is 1.91 meters squared.    Physical Exam  Constitutional:       Appearance: She is well-developed.   HENT:      Head: Normocephalic and atraumatic.   Eyes:      Pupils: Pupils are equal, round, and reactive to light.   Cardiovascular:      Rate and Rhythm: Normal rate and regular rhythm.      Heart sounds: Normal heart sounds.   Pulmonary:      Effort: Pulmonary effort is normal. No respiratory distress.      Breath sounds: Normal breath sounds.   Abdominal:      General: Bowel sounds are normal. There is no distension.      Palpations: Abdomen is soft. Abdomen is not rigid.      Tenderness: There is no abdominal tenderness. There is no guarding or rebound.   Genitourinary:     Comments: -Normal external female genitalia, no excoriations.  Biopsy site is healed.  Edema is reduced.  Erythema is reduced from prior visit normal Bartholin's and [place]'s glands                  -Normal midline urethral meatus. No lesions notes                  -Bladder without fullness mass or tenderness                  -  Musculoskeletal:         General: Normal range of motion.      Cervical back: Normal range of motion and neck supple.   Lymphadenopathy:      Cervical: No cervical adenopathy.      Upper Body:      Right " "upper body: No supraclavicular adenopathy.      Left upper body: No supraclavicular adenopathy.   Skin:     General: Skin is warm and dry.   Neurological:      Mental Status: She is alert and oriented to person, place, and time.   Psychiatric:         Behavior: Behavior normal.         No results found for: \"\"  Lab Results   Component Value Date    K 4.6 08/23/2024     08/23/2024    CO2 30 08/23/2024    BUN 17 08/23/2024    CREATININE 1.11 (H) 08/23/2024    GLUF 113 (H) 07/01/2024    CALCIUM 9.5 08/23/2024    AST 15 05/20/2024    ALT 10 05/20/2024    ALKPHOS 81 05/20/2024    EGFR 50 (L) 08/23/2024     Lab Results   Component Value Date    WBC 8.99 09/28/2023    HGB 14.8 09/28/2023    HCT 46.3 (H) 09/28/2023    MCV 98 09/28/2023     09/28/2023     Lab Results   Component Value Date    NEUTROABS 5.38 09/28/2023        Trend:  No results found for: \"\"              "

## 2024-10-29 ENCOUNTER — OFFICE VISIT (OUTPATIENT)
Dept: GYNECOLOGIC ONCOLOGY | Facility: CLINIC | Age: 79
End: 2024-10-29
Payer: COMMERCIAL

## 2024-10-29 VITALS
BODY MASS INDEX: 30.34 KG/M2 | HEIGHT: 66 IN | OXYGEN SATURATION: 99 % | WEIGHT: 188.8 LBS | RESPIRATION RATE: 18 BRPM | TEMPERATURE: 97.5 F | SYSTOLIC BLOOD PRESSURE: 134 MMHG | HEART RATE: 91 BPM | DIASTOLIC BLOOD PRESSURE: 80 MMHG

## 2024-10-29 DIAGNOSIS — N76.3 CHRONIC VULVITIS: Primary | ICD-10-CM

## 2024-10-29 PROCEDURE — 99213 OFFICE O/P EST LOW 20 MIN: CPT | Performed by: OBSTETRICS & GYNECOLOGY

## 2024-10-29 NOTE — ASSESSMENT & PLAN NOTE
Patient has slightly worsening chronic vulvitis.  She is using no medication of treatment at this time.  She is tried steroids estrogen antifungals and none of this has been helpful.  We have taken her off all treatment except for Vaseline which seems to be exacerbating the situation.    We have recommended aqua for to be used on an as-needed basis.  Patient will follow-up in 2 to 4 weeks for reevaluation.  If no improvement is made specialist may be required.

## 2024-10-29 NOTE — PROGRESS NOTES
Assessment/Plan:    Problem List Items Addressed This Visit       Chronic vulvitis - Primary     Patient has slightly worsening chronic vulvitis.  She is using no medication of treatment at this time.  She is tried steroids estrogen antifungals and none of this has been helpful.  We have taken her off all treatment except for Vaseline which seems to be exacerbating the situation.    We have recommended aqua for to be used on an as-needed basis.  Patient will follow-up in 2 to 4 weeks for reevaluation.  If no improvement is made specialist may be required.              CHIEF COMPLAINT: Chronic vulvitis      Problem:  Cancer Staging   Personal history of malignant melanoma  Staging form: Melanoma of the Skin, AJCC 8th Edition  - Pathologic stage from 4/20/2021: Stage IA (pT1a, cN0, cM0) - Signed by SVETLANA Lynn on 4/30/2024        Previous therapy:  Oncology History   Personal history of malignant melanoma   3/18/2021 Initial Diagnosis    Melanoma (HCC)     3/18/2021 Biopsy    Left upper back shave biopsy:  Malignant melanoma   - at least 0.4 mm thickness   - 0 mitosis   - negative ulceration     4/20/2021 Surgery    In office wide excision  A. Skin, left upper back, excision:  - Residual melanoma in-situ; prior biopsy site reaction.  - Atypical lentiginous compound nevus.  - Inked margins with no tumor seen     4/20/2021 -  Cancer Staged    Staging form: Melanoma of the Skin, AJCC 8th Edition  - Pathologic stage from 4/20/2021: Stage IA (pT1a, cN0, cM0) - Signed by SVETLANA Lynn on 4/30/2024  Stage prefix: Initial diagnosis             Patient ID: Rebeca Mccartney is a 78 y.o. adult  Patient is a very pleasant 78-year-old female with a history of acute and chronic vulvitis.  She spent some time in her nursing home after surgery and did not have perineal care.  She developed a vulvitis and is undergone treatment with multiple treatments.  2 biopsies have been performed.  The first was worrisome for TERENCE.   The second biopsy did not reveal any TERENCE.  When the patient was here a month ago we had recommended no further treatment as the vulva was getting better on its own.    Since patient's last visit all treatment was stopped with exception of Vaseline.  The Vaseline does provide some improvement however is very painful going on.  The patient has had some skin bleeding as well.  The chronic vulvitis is caused a significant change in lifestyle.  The patient is unable to leave her house due to the discomfort.    Today, the patient is doing well.  She denies significant abdominal pain, pelvic pain, nausea, vomiting, constipation, diarrhea, fevers, chills, or vaginal bleeding.           The following portions of the patient's history were reviewed and updated as appropriate: allergies, current medications, past family history, past medical history, past social history, past surgical history, and problem list.    Review of Systems   Constitutional: Negative.    HENT: Negative.     Eyes: Negative.    Respiratory: Negative.     Cardiovascular: Negative.    Gastrointestinal: Negative.    Endocrine: Negative.    Genitourinary:  Positive for genital sores.   Musculoskeletal: Negative.    Skin: Negative.    Neurological: Negative.    Hematological: Negative.    Psychiatric/Behavioral: Negative.         Current Outpatient Medications   Medication Sig Dispense Refill    acetaminophen (TYLENOL) 325 mg tablet Take 650 mg by mouth every 6 (six) hours as needed for mild pain      ALPRAZolam (XANAX) 0.25 mg tablet Take 0.25 mg by mouth as needed      benazepril (LOTENSIN) 5 mg tablet Take 5 mg by mouth daily      diphenhydrAMINE (BENADRYL) 25 mg tablet Take 25 mg by mouth 2 (two) times a day      Eliquis 5 MG Take 5 mg by mouth 2 (two) times a day      glimepiride (AMARYL) 4 mg tablet Take 4 mg by mouth 2 (two) times a day      Levemir FlexTouch 100 units/mL injection pen       lidocaine (LMX) 4 % cream Apply to affected area 3 times per  "day as needed for itching and burning 28 g 1    lovastatin (MEVACOR) 40 MG tablet       metFORMIN (GLUCOPHAGE-XR) 500 mg 24 hr tablet Take 1 tablet by mouth 2 (two) times a day with meals      OneTouch Verio test strip       ciclopirox (LOPROX) 0.77 % cream Apply topically (Patient not taking: Reported on 9/11/2024)      latanoprost (XALATAN) 0.005 % ophthalmic solution  (Patient not taking: Reported on 9/11/2024)       No current facility-administered medications for this visit.           Objective:    Blood pressure 134/80, pulse 91, temperature 97.5 °F (36.4 °C), temperature source Tympanic, resp. rate 18, height 5' 6\" (1.676 m), weight 85.6 kg (188 lb 12.8 oz), SpO2 99%.  Body mass index is 30.47 kg/m².  Body surface area is 1.95 meters squared.    Physical Exam  Constitutional:       Appearance: She is well-developed.   HENT:      Head: Normocephalic and atraumatic.   Eyes:      Pupils: Pupils are equal, round, and reactive to light.   Cardiovascular:      Rate and Rhythm: Normal rate and regular rhythm.      Heart sounds: Normal heart sounds.   Pulmonary:      Effort: Pulmonary effort is normal. No respiratory distress.      Breath sounds: Normal breath sounds.   Abdominal:      General: Bowel sounds are normal. There is no distension.      Palpations: Abdomen is soft. Abdomen is not rigid.      Tenderness: There is no abdominal tenderness. There is no guarding or rebound.   Genitourinary:     Comments: External female genitalia with diffuse edema and excoriation surrounding the vulva.  The 2 excoriated areas are in the introitus at approximately 7 and 5:00.  The labia minora are markedly edematous and appear more pronounced than last visit    Musculoskeletal:         General: Normal range of motion.      Cervical back: Normal range of motion and neck supple.   Lymphadenopathy:      Cervical: No cervical adenopathy.      Upper Body:      Right upper body: No supraclavicular adenopathy.      Left upper body: No " "supraclavicular adenopathy.   Skin:     General: Skin is warm and dry.   Neurological:      Mental Status: She is alert and oriented to person, place, and time.   Psychiatric:         Behavior: Behavior normal.         No results found for: \"\"  Lab Results   Component Value Date    K 4.6 08/23/2024     08/23/2024    CO2 30 08/23/2024    BUN 17 08/23/2024    CREATININE 1.11 (H) 08/23/2024    GLUF 113 (H) 07/01/2024    CALCIUM 9.5 08/23/2024    AST 15 05/20/2024    ALT 10 05/20/2024    ALKPHOS 81 05/20/2024    EGFR 50 (L) 08/23/2024     Lab Results   Component Value Date    WBC 8.99 09/28/2023    HGB 14.8 09/28/2023    HCT 46.3 (H) 09/28/2023    MCV 98 09/28/2023     09/28/2023     Lab Results   Component Value Date    NEUTROABS 5.38 09/28/2023        Trend:  No results found for: \"\"              "

## 2024-11-12 ENCOUNTER — OFFICE VISIT (OUTPATIENT)
Dept: GYNECOLOGIC ONCOLOGY | Facility: CLINIC | Age: 79
End: 2024-11-12
Payer: COMMERCIAL

## 2024-11-12 VITALS
BODY MASS INDEX: 30.53 KG/M2 | TEMPERATURE: 97 F | OXYGEN SATURATION: 98 % | HEIGHT: 66 IN | WEIGHT: 190 LBS | SYSTOLIC BLOOD PRESSURE: 132 MMHG | DIASTOLIC BLOOD PRESSURE: 78 MMHG | HEART RATE: 91 BPM | RESPIRATION RATE: 16 BRPM

## 2024-11-12 DIAGNOSIS — N76.3 CHRONIC VULVITIS: Primary | ICD-10-CM

## 2024-11-12 PROCEDURE — 99213 OFFICE O/P EST LOW 20 MIN: CPT | Performed by: OBSTETRICS & GYNECOLOGY

## 2024-11-12 RX ORDER — METHYLPREDNISOLONE 4 MG/1
TABLET ORAL
Qty: 1 EACH | Refills: 1 | Status: SHIPPED | OUTPATIENT
Start: 2024-11-12

## 2024-11-12 NOTE — ASSESSMENT & PLAN NOTE
Patient has had chronic vulvitis for several months this appears to be getting worse.  Biopsies have not shown any evidence of dermatitis or malignancy.  Even topical agents are causing problems.  I recommended starting a course of a Medrol Dosepak.  I have discussed with the patient that her sugars will likely be elevated as a result.  Will try this for a week and see the patient back in 2 weeks.

## 2024-11-12 NOTE — PROGRESS NOTES
Assessment/Plan:    Problem List Items Addressed This Visit       Chronic vulvitis - Primary     Patient has had chronic vulvitis for several months this appears to be getting worse.  Biopsies have not shown any evidence of dermatitis or malignancy.  Even topical agents are causing problems.  I recommended starting a course of a Medrol Dosepak.  I have discussed with the patient that her sugars will likely be elevated as a result.  Will try this for a week and see the patient back in 2 weeks.         Relevant Medications    methylPREDNISolone 4 MG tablet therapy pack         CHIEF COMPLAINT: Chronic vulvitis      Problem:  Cancer Staging   Personal history of malignant melanoma  Staging form: Melanoma of the Skin, AJCC 8th Edition  - Pathologic stage from 4/20/2021: Stage IA (pT1a, cN0, cM0) - Signed by SVETLANA Lynn on 4/30/2024        Previous therapy:  Oncology History   Personal history of malignant melanoma   3/18/2021 Initial Diagnosis    Melanoma (HCC)     3/18/2021 Biopsy    Left upper back shave biopsy:  Malignant melanoma   - at least 0.4 mm thickness   - 0 mitosis   - negative ulceration     4/20/2021 Surgery    In office wide excision  A. Skin, left upper back, excision:  - Residual melanoma in-situ; prior biopsy site reaction.  - Atypical lentiginous compound nevus.  - Inked margins with no tumor seen     4/20/2021 -  Cancer Staged    Staging form: Melanoma of the Skin, AJCC 8th Edition  - Pathologic stage from 4/20/2021: Stage IA (pT1a, cN0, cM0) - Signed by SVETLANA Lynn on 4/30/2024  Stage prefix: Initial diagnosis             Patient ID: Rebeca Mccartney is a 78 y.o. adult  Patient is a very pleasant 78-year-old female with a history of chronic vulvitis which began after a procedure in February.  She has been on multiple topical steroids estrogen Vaseline and now aqua for none of which have caused significant improvement.  Biopsies have not shown any significant findings.    Patient  states that she does feel like things are worse after the Aquaphor.        The following portions of the patient's history were reviewed and updated as appropriate: allergies, current medications, past family history, past medical history, past social history, past surgical history, and problem list.    Review of Systems   Constitutional: Negative.    HENT: Negative.     Eyes: Negative.    Respiratory: Negative.     Cardiovascular: Negative.    Gastrointestinal: Negative.    Endocrine: Negative.    Genitourinary:  Positive for vaginal pain.   Musculoskeletal: Negative.    Skin: Negative.    Neurological: Negative.    Hematological: Negative.    Psychiatric/Behavioral: Negative.         Current Outpatient Medications   Medication Sig Dispense Refill    acetaminophen (TYLENOL) 325 mg tablet Take 650 mg by mouth every 6 (six) hours as needed for mild pain      ALPRAZolam (XANAX) 0.25 mg tablet Take 0.25 mg by mouth as needed      benazepril (LOTENSIN) 5 mg tablet Take 5 mg by mouth daily      diphenhydrAMINE (BENADRYL) 25 mg tablet Take 25 mg by mouth 2 (two) times a day      Eliquis 5 MG Take 5 mg by mouth 2 (two) times a day      glimepiride (AMARYL) 4 mg tablet Take 4 mg by mouth 2 (two) times a day      Levemir FlexTouch 100 units/mL injection pen       lidocaine (LMX) 4 % cream Apply to affected area 3 times per day as needed for itching and burning 28 g 1    lovastatin (MEVACOR) 40 MG tablet       metFORMIN (GLUCOPHAGE-XR) 500 mg 24 hr tablet Take 1 tablet by mouth 2 (two) times a day with meals      methylPREDNISolone 4 MG tablet therapy pack Use as directed on package 1 each 1    OneTouch Verio test strip       ciclopirox (LOPROX) 0.77 % cream Apply topically (Patient not taking: Reported on 9/11/2024)      latanoprost (XALATAN) 0.005 % ophthalmic solution  (Patient not taking: Reported on 9/11/2024)       No current facility-administered medications for this visit.           Objective:    Blood pressure  "132/78, pulse 91, temperature (!) 97 °F (36.1 °C), temperature source Tympanic, resp. rate 16, height 5' 6\" (1.676 m), weight 86.2 kg (190 lb), SpO2 98%.  Body mass index is 30.67 kg/m².  Body surface area is 1.96 meters squared.    Physical Exam  Constitutional:       Appearance: She is well-developed.   HENT:      Head: Normocephalic and atraumatic.   Eyes:      Pupils: Pupils are equal, round, and reactive to light.   Cardiovascular:      Rate and Rhythm: Normal rate and regular rhythm.      Heart sounds: Normal heart sounds.   Pulmonary:      Effort: Pulmonary effort is normal. No respiratory distress.      Breath sounds: Normal breath sounds.   Abdominal:      General: Bowel sounds are normal. There is no distension.      Palpations: Abdomen is soft. Abdomen is not rigid.      Tenderness: There is no abdominal tenderness. There is no guarding or rebound.   Genitourinary:     Comments: External female genitalia edematous and raw.  It appears worse than last    Musculoskeletal:         General: Normal range of motion.      Cervical back: Normal range of motion and neck supple.   Lymphadenopathy:      Cervical: No cervical adenopathy.      Upper Body:      Right upper body: No supraclavicular adenopathy.      Left upper body: No supraclavicular adenopathy.   Skin:     General: Skin is warm and dry.   Neurological:      Mental Status: She is alert and oriented to person, place, and time.   Psychiatric:         Behavior: Behavior normal.         No results found for: \"\"  Lab Results   Component Value Date    K 4.6 08/23/2024     08/23/2024    CO2 30 08/23/2024    BUN 17 08/23/2024    CREATININE 1.11 (H) 08/23/2024    GLUF 113 (H) 07/01/2024    CALCIUM 9.5 08/23/2024    AST 15 05/20/2024    ALT 10 05/20/2024    ALKPHOS 81 05/20/2024    EGFR 50 (L) 08/23/2024     Lab Results   Component Value Date    WBC 8.99 09/28/2023    HGB 14.8 09/28/2023    HCT 46.3 (H) 09/28/2023    MCV 98 09/28/2023     " "09/28/2023     Lab Results   Component Value Date    NEUTROABS 5.38 09/28/2023        Trend:  No results found for: \"\"              "

## 2024-11-26 ENCOUNTER — OFFICE VISIT (OUTPATIENT)
Dept: GYNECOLOGIC ONCOLOGY | Facility: CLINIC | Age: 79
End: 2024-11-26
Payer: COMMERCIAL

## 2024-11-26 VITALS
TEMPERATURE: 97 F | DIASTOLIC BLOOD PRESSURE: 76 MMHG | OXYGEN SATURATION: 98 % | RESPIRATION RATE: 16 BRPM | BODY MASS INDEX: 30.63 KG/M2 | WEIGHT: 190.6 LBS | SYSTOLIC BLOOD PRESSURE: 134 MMHG | HEIGHT: 66 IN | HEART RATE: 97 BPM

## 2024-11-26 DIAGNOSIS — N76.3 CHRONIC VULVITIS: Primary | ICD-10-CM

## 2024-11-26 PROCEDURE — 88305 TISSUE EXAM BY PATHOLOGIST: CPT | Performed by: PATHOLOGY

## 2024-11-26 PROCEDURE — 99213 OFFICE O/P EST LOW 20 MIN: CPT | Performed by: OBSTETRICS & GYNECOLOGY

## 2024-11-26 PROCEDURE — 88342 IMHCHEM/IMCYTCHM 1ST ANTB: CPT | Performed by: PATHOLOGY

## 2024-11-26 PROCEDURE — 88312 SPECIAL STAINS GROUP 1: CPT | Performed by: PATHOLOGY

## 2024-11-26 PROCEDURE — 56605 BIOPSY OF VULVA/PERINEUM: CPT | Performed by: OBSTETRICS & GYNECOLOGY

## 2024-11-26 PROCEDURE — 88344 IMHCHEM/IMCYTCHM EA MLT ANTB: CPT | Performed by: PATHOLOGY

## 2024-11-26 NOTE — PROGRESS NOTES
Assessment/Plan:    Problem List Items Addressed This Visit       Chronic vulvitis - Primary    Patient has a continuation of her chronic vulvitis.  It is extremely and exquisitely tender.  It is erosive.  It appears to be consistent with lichen planus although biopsy has not supported that.  As a result I have rebiopsied.    No treatment including topical steroids and oral steroids were effective.    At this point I will refer her back to dermatology for management of lichen planus.  Biopsy is presently pending.              CHIEF COMPLAINT:       Problem:  Cancer Staging   Personal history of malignant melanoma  Staging form: Melanoma of the Skin, AJCC 8th Edition  - Pathologic stage from 4/20/2021: Stage IA (pT1a, cN0, cM0) - Signed by SVETLANA Lynn on 4/30/2024        Previous therapy:  Oncology History   Personal history of malignant melanoma   3/18/2021 Initial Diagnosis    Melanoma (HCC)     3/18/2021 Biopsy    Left upper back shave biopsy:  Malignant melanoma   - at least 0.4 mm thickness   - 0 mitosis   - negative ulceration     4/20/2021 Surgery    In office wide excision  A. Skin, left upper back, excision:  - Residual melanoma in-situ; prior biopsy site reaction.  - Atypical lentiginous compound nevus.  - Inked margins with no tumor seen     4/20/2021 -  Cancer Staged    Staging form: Melanoma of the Skin, AJCC 8th Edition  - Pathologic stage from 4/20/2021: Stage IA (pT1a, cN0, cM0) - Signed by SVETLANA Lynn on 4/30/2024  Stage prefix: Initial diagnosis             Patient ID: Rebeca Mccartney is a 78 y.o. adult  Patient is a very pleasant 78-year-old female with a history of a vulvar lesion which initiated after surgery for hip replacement in February 2024.  Since that time she has been ongoing multiple attempts at trying to get this lesion sorted out.  Biopsies did not show significant TERENCE or other demonstrable lesions.  The lesion is extremely painful.  We have tried topical steroids  and antifungals.  We have tried oral steroids which did not show any improvement.  A rebiopsy will be performed today.        The following portions of the patient's history were reviewed and updated as appropriate: allergies, current medications, past family history, past medical history, past social history, past surgical history, and problem list.    Review of Systems   Constitutional: Negative.    HENT: Negative.     Eyes: Negative.    Respiratory: Negative.     Cardiovascular: Negative.    Gastrointestinal: Negative.    Endocrine: Negative.    Genitourinary: Negative.         Vulvar pain   Musculoskeletal: Negative.    Skin: Negative.    Neurological: Negative.    Hematological: Negative.    Psychiatric/Behavioral: Negative.         Current Outpatient Medications   Medication Sig Dispense Refill    acetaminophen (TYLENOL) 325 mg tablet Take 650 mg by mouth every 6 (six) hours as needed for mild pain      ALPRAZolam (XANAX) 0.25 mg tablet Take 0.25 mg by mouth as needed      benazepril (LOTENSIN) 5 mg tablet Take 5 mg by mouth daily      diphenhydrAMINE (BENADRYL) 25 mg tablet Take 25 mg by mouth 2 (two) times a day      Eliquis 5 MG Take 5 mg by mouth 2 (two) times a day      glimepiride (AMARYL) 4 mg tablet Take 4 mg by mouth 2 (two) times a day      Levemir FlexTouch 100 units/mL injection pen       lidocaine (LMX) 4 % cream Apply to affected area 3 times per day as needed for itching and burning 28 g 1    lovastatin (MEVACOR) 40 MG tablet       metFORMIN (GLUCOPHAGE-XR) 500 mg 24 hr tablet Take 1 tablet by mouth 2 (two) times a day with meals      methylPREDNISolone 4 MG tablet therapy pack Use as directed on package 1 each 1    OneTouch Verio test strip       ciclopirox (LOPROX) 0.77 % cream Apply topically (Patient not taking: Reported on 9/11/2024)      latanoprost (XALATAN) 0.005 % ophthalmic solution  (Patient not taking: Reported on 9/11/2024)       No current facility-administered medications for this  "visit.           Objective:    Blood pressure 134/76, pulse 97, temperature (!) 97 °F (36.1 °C), temperature source Tympanic, resp. rate 16, height 5' 6\" (1.676 m), weight 86.5 kg (190 lb 9.6 oz), SpO2 98%.  Body mass index is 30.76 kg/m².  Body surface area is 1.96 meters squared.    Physical Exam  Constitutional:       Appearance: She is well-developed.   HENT:      Head: Normocephalic and atraumatic.   Eyes:      Pupils: Pupils are equal, round, and reactive to light.   Cardiovascular:      Rate and Rhythm: Normal rate and regular rhythm.      Heart sounds: Normal heart sounds.   Pulmonary:      Effort: Pulmonary effort is normal. No respiratory distress.      Breath sounds: Normal breath sounds.   Abdominal:      General: Bowel sounds are normal. There is no distension.      Palpations: Abdomen is soft. Abdomen is not rigid.      Tenderness: There is no abdominal tenderness. There is no guarding or rebound.   Genitourinary:     Comments: Erosive vulvitis at the introital area.  There is edema of the bilateral labia minora.  This is most consistent with lichen planus    Musculoskeletal:         General: Normal range of motion.      Cervical back: Normal range of motion and neck supple.   Lymphadenopathy:      Cervical: No cervical adenopathy.      Upper Body:      Right upper body: No supraclavicular adenopathy.      Left upper body: No supraclavicular adenopathy.   Skin:     General: Skin is warm and dry.   Neurological:      Mental Status: She is alert and oriented to person, place, and time.   Psychiatric:         Behavior: Behavior normal.         No results found for: \"\"  Lab Results   Component Value Date    K 4.6 08/23/2024     08/23/2024    CO2 30 08/23/2024    BUN 17 08/23/2024    CREATININE 1.11 (H) 08/23/2024    GLUF 113 (H) 07/01/2024    CALCIUM 9.5 08/23/2024    AST 15 05/20/2024    ALT 10 05/20/2024    ALKPHOS 81 05/20/2024    EGFR 50 (L) 08/23/2024     Lab Results   Component Value Date "    WBC 8.99 09/28/2023    HGB 14.8 09/28/2023    HCT 46.3 (H) 09/28/2023    MCV 98 09/28/2023     09/28/2023     Lab Results   Component Value Date    NEUTROABS 5.38 09/28/2023       Lesion Biopsy    Date/Time: 11/26/2024 11:00 AM    Performed by: Mike Hidalgo MD  Authorized by: Mike Hidalgo MD  Universal Protocol:  Consent given by: patient  Patient understanding: patient states understanding of the procedure being performed  Patient identity confirmed: verbally with patient    Procedure Details - Lesion Biopsy:     Body area:  Anogenital    Anogenital location:  Vulva    Biopsy method: punch biopsy      Biopsy tissue type: mucous membrane    Malignancy: benign lesion       After verbal informed consent the area was cleansed with Betadine infiltrated with 1% lidocaine and a punch biopsy at 5:00 was performed.  Silver nitrate was used to control bleeding.

## 2024-11-26 NOTE — ASSESSMENT & PLAN NOTE
Patient has a continuation of her chronic vulvitis.  It is extremely and exquisitely tender.  It is erosive.  It appears to be consistent with lichen planus although biopsy has not supported that.  As a result I have rebiopsied.    No treatment including topical steroids and oral steroids were effective.    At this point I will refer her back to dermatology for management of lichen planus.  Biopsy is presently pending.

## 2024-11-26 NOTE — LETTER
November 26, 2024     Jaci Camara PA-C  [address] [facility] [address]    Patient: Rebeca Mccartney   YOB: 1945   Date of Visit: 11/26/2024       Dear Dr. Camara:    Thank you for referring Rebeca Mccartney to me for evaluation. Below are my notes for this consultation.    If you have questions, please do not hesitate to call me. I look forward to following your patient along with you.         Sincerely,        Mike Hidalgo MD        CC: No Recipients    Mike Hidalgo MD  11/26/2024 11:30 AM  Incomplete  Assessment/Plan:    Problem List Items Addressed This Visit    None        CHIEF COMPLAINT:       Problem:  Cancer Staging   Personal history of malignant melanoma  Staging form: Melanoma of the Skin, AJCC 8th Edition  - Pathologic stage from 4/20/2021: Stage IA (pT1a, cN0, cM0) - Signed by SVETLANA Lynn on 4/30/2024        Previous therapy:  Oncology History   Personal history of malignant melanoma   3/18/2021 Initial Diagnosis    Melanoma (HCC)     3/18/2021 Biopsy    Left upper back shave biopsy:  Malignant melanoma   - at least 0.4 mm thickness   - 0 mitosis   - negative ulceration     4/20/2021 Surgery    In office wide excision  A. Skin, left upper back, excision:  - Residual melanoma in-situ; prior biopsy site reaction.  - Atypical lentiginous compound nevus.  - Inked margins with no tumor seen     4/20/2021 -  Cancer Staged    Staging form: Melanoma of the Skin, AJCC 8th Edition  - Pathologic stage from 4/20/2021: Stage IA (pT1a, cN0, cM0) - Signed by SVETLANA Lynn on 4/30/2024  Stage prefix: Initial diagnosis             Patient ID: Rebeca Mccartney is a 78 y.o. adult  Patient is a very pleasant 78-year-old female with a history of a vulvar lesion which initiated after surgery for hip replacement in February 2024.  Since that time she has been ongoing multiple attempts at trying to get this lesion sorted out.  Biopsies did not show significant TERENCE or other  demonstrable lesions.  The lesion is extremely painful.  We have tried topical steroids and antifungals.  We have tried oral steroids which did not show any improvement.  A rebiopsy will be performed today.        The following portions of the patient's history were reviewed and updated as appropriate: allergies, current medications, past family history, past medical history, past social history, past surgical history, and problem list.    Review of Systems   Constitutional: Negative.    HENT: Negative.     Eyes: Negative.    Respiratory: Negative.     Cardiovascular: Negative.    Gastrointestinal: Negative.    Endocrine: Negative.    Genitourinary: Negative.         Vulvar pain   Musculoskeletal: Negative.    Skin: Negative.    Neurological: Negative.    Hematological: Negative.    Psychiatric/Behavioral: Negative.         Current Outpatient Medications   Medication Sig Dispense Refill   • acetaminophen (TYLENOL) 325 mg tablet Take 650 mg by mouth every 6 (six) hours as needed for mild pain     • ALPRAZolam (XANAX) 0.25 mg tablet Take 0.25 mg by mouth as needed     • benazepril (LOTENSIN) 5 mg tablet Take 5 mg by mouth daily     • diphenhydrAMINE (BENADRYL) 25 mg tablet Take 25 mg by mouth 2 (two) times a day     • Eliquis 5 MG Take 5 mg by mouth 2 (two) times a day     • glimepiride (AMARYL) 4 mg tablet Take 4 mg by mouth 2 (two) times a day     • Levemir FlexTouch 100 units/mL injection pen      • lidocaine (LMX) 4 % cream Apply to affected area 3 times per day as needed for itching and burning 28 g 1   • lovastatin (MEVACOR) 40 MG tablet      • metFORMIN (GLUCOPHAGE-XR) 500 mg 24 hr tablet Take 1 tablet by mouth 2 (two) times a day with meals     • methylPREDNISolone 4 MG tablet therapy pack Use as directed on package 1 each 1   • OneTouch Verio test strip      • ciclopirox (LOPROX) 0.77 % cream Apply topically (Patient not taking: Reported on 9/11/2024)     • latanoprost (XALATAN) 0.005 % ophthalmic solution   "(Patient not taking: Reported on 9/11/2024)       No current facility-administered medications for this visit.           Objective:    Blood pressure 134/76, pulse 97, temperature (!) 97 °F (36.1 °C), temperature source Tympanic, resp. rate 16, height 5' 6\" (1.676 m), weight 86.5 kg (190 lb 9.6 oz), SpO2 98%.  Body mass index is 30.76 kg/m².  Body surface area is 1.96 meters squared.    Physical Exam  Constitutional:       Appearance: She is well-developed.   HENT:      Head: Normocephalic and atraumatic.   Eyes:      Pupils: Pupils are equal, round, and reactive to light.   Cardiovascular:      Rate and Rhythm: Normal rate and regular rhythm.      Heart sounds: Normal heart sounds.   Pulmonary:      Effort: Pulmonary effort is normal. No respiratory distress.      Breath sounds: Normal breath sounds.   Abdominal:      General: Bowel sounds are normal. There is no distension.      Palpations: Abdomen is soft. Abdomen is not rigid.      Tenderness: There is no abdominal tenderness. There is no guarding or rebound.   Genitourinary:     Comments: Erosive vulvitis at the introital area.  There is edema of the bilateral labia minora.  This is most consistent with lichen planus    Musculoskeletal:         General: Normal range of motion.      Cervical back: Normal range of motion and neck supple.   Lymphadenopathy:      Cervical: No cervical adenopathy.      Upper Body:      Right upper body: No supraclavicular adenopathy.      Left upper body: No supraclavicular adenopathy.   Skin:     General: Skin is warm and dry.   Neurological:      Mental Status: She is alert and oriented to person, place, and time.   Psychiatric:         Behavior: Behavior normal.         No results found for: \"\"  Lab Results   Component Value Date    K 4.6 08/23/2024     08/23/2024    CO2 30 08/23/2024    BUN 17 08/23/2024    CREATININE 1.11 (H) 08/23/2024    GLUF 113 (H) 07/01/2024    CALCIUM 9.5 08/23/2024    AST 15 05/20/2024    ALT " 10 05/20/2024    ALKPHOS 81 05/20/2024    EGFR 50 (L) 08/23/2024     Lab Results   Component Value Date    WBC 8.99 09/28/2023    HGB 14.8 09/28/2023    HCT 46.3 (H) 09/28/2023    MCV 98 09/28/2023     09/28/2023     Lab Results   Component Value Date    NEUTROABS 5.38 09/28/2023       Lesion Biopsy    Date/Time: 11/26/2024 11:00 AM    Performed by: Mike Hidalgo MD  Authorized by: Mike Hidalgo MD  Universal Protocol:  Consent given by: patient  Patient understanding: patient states understanding of the procedure being performed  Patient identity confirmed: verbally with patient    Procedure Details - Lesion Biopsy:     Body area:  Anogenital    Anogenital location:  Vulva    Biopsy method: punch biopsy      Biopsy tissue type: mucous membrane    Malignancy: benign lesion       After verbal informed consent the area was cleansed with Betadine infiltrated with 1% lidocaine and a punch biopsy at 5:00 was performed.  Silver nitrate was used to control bleeding.

## 2024-11-27 ENCOUNTER — TELEPHONE (OUTPATIENT)
Age: 79
End: 2024-11-27

## 2024-11-27 NOTE — TELEPHONE ENCOUNTER
I left a message for [facility] to call the office back to provide an alternate fax number due to the fax number that was given not working to fax the pathology report. .

## 2024-11-27 NOTE — TELEPHONE ENCOUNTER
Emi from [facility] called in regards to 9/11/24 pathology report needing to be faxed over. Confirmed fax with her and it [phone] she said its working and if not it can be emailed to  [email or web address]

## 2024-11-27 NOTE — TELEPHONE ENCOUNTER
Emi from [facility] is requesting pt's 9/11/24 lesion biopsy report for the pt due to Dr. Hidalgo referring pt back to them. They provided a fax number of [phone] and ask attention be placed for Emi. Also their phone number is [phone]. Thank you.

## 2024-12-03 PROCEDURE — 88305 TISSUE EXAM BY PATHOLOGIST: CPT | Performed by: PATHOLOGY

## 2024-12-03 PROCEDURE — 88344 IMHCHEM/IMCYTCHM EA MLT ANTB: CPT | Performed by: PATHOLOGY

## 2024-12-03 PROCEDURE — 88342 IMHCHEM/IMCYTCHM 1ST ANTB: CPT | Performed by: PATHOLOGY

## 2024-12-03 PROCEDURE — 88312 SPECIAL STAINS GROUP 1: CPT | Performed by: PATHOLOGY

## 2025-04-01 ENCOUNTER — HOSPITAL ENCOUNTER (OUTPATIENT)
Dept: NON INVASIVE DIAGNOSTICS | Facility: CLINIC | Age: 80
Discharge: HOME | End: 2025-04-01
Payer: COMMERCIAL

## 2025-04-01 DIAGNOSIS — I70.211 ATHEROSCLEROSIS OF NATIVE ARTERY OF RIGHT LOWER EXTREMITY WITH INTERMITTENT CLAUDICATION (HCC): ICD-10-CM

## 2025-04-01 DIAGNOSIS — I73.9 PAD (PERIPHERAL ARTERY DISEASE) (HCC): ICD-10-CM

## 2025-04-01 PROCEDURE — 93923 UPR/LXTR ART STDY 3+ LVLS: CPT

## 2025-04-01 PROCEDURE — 93925 LOWER EXTREMITY STUDY: CPT

## 2025-04-01 PROCEDURE — 93922 UPR/L XTREMITY ART 2 LEVELS: CPT | Performed by: SURGERY

## 2025-04-01 PROCEDURE — 93925 LOWER EXTREMITY STUDY: CPT | Performed by: SURGERY

## 2025-04-02 ENCOUNTER — RESULTS FOLLOW-UP (OUTPATIENT)
Dept: VASCULAR SURGERY | Facility: CLINIC | Age: 80
End: 2025-04-02

## 2025-05-16 ENCOUNTER — TELEPHONE (OUTPATIENT)
Dept: SURGICAL ONCOLOGY | Facility: CLINIC | Age: 80
End: 2025-05-16

## 2025-05-16 NOTE — TELEPHONE ENCOUNTER
Called patient to rescheduled follow up with SVETLANA Castellano, she cancelled 5/30/25 appointment, provided surgical oncology number.

## 2025-05-19 ENCOUNTER — TELEPHONE (OUTPATIENT)
Dept: SURGICAL ONCOLOGY | Facility: CLINIC | Age: 80
End: 2025-05-19

## 2025-05-19 NOTE — TELEPHONE ENCOUNTER
Called and spoke to patient's spouse,he will call back to reschedule follow up with SVETLANA Castellano after patient is discharged. Patient cancelled 5/30/25 appointment. Provided surgical oncology number.